# Patient Record
Sex: FEMALE | Race: WHITE | NOT HISPANIC OR LATINO | Employment: FULL TIME | URBAN - METROPOLITAN AREA
[De-identification: names, ages, dates, MRNs, and addresses within clinical notes are randomized per-mention and may not be internally consistent; named-entity substitution may affect disease eponyms.]

---

## 2023-03-08 PROBLEM — R42 DIZZINESS: Status: ACTIVE | Noted: 2023-03-08

## 2023-03-08 PROBLEM — G43.711 CHRONIC MIGRAINE WITHOUT AURA, WITH INTRACTABLE MIGRAINE, SO STATED, WITH STATUS MIGRAINOSUS: Status: ACTIVE | Noted: 2023-03-08

## 2023-03-08 PROBLEM — R09.81 NASAL CONGESTION WITH RHINORRHEA: Status: ACTIVE | Noted: 2023-03-08

## 2023-03-08 PROBLEM — M54.59 MECHANICAL LOW BACK PAIN: Status: ACTIVE | Noted: 2023-03-08

## 2023-03-08 PROBLEM — J34.2 DEVIATED NASAL SEPTUM: Status: ACTIVE | Noted: 2023-03-08

## 2023-03-08 PROBLEM — M46.1 SACROILIAC INFLAMMATION (CMS-HCC): Status: ACTIVE | Noted: 2023-03-08

## 2023-03-08 PROBLEM — J34.89 NASAL DRAINAGE: Status: ACTIVE | Noted: 2023-03-08

## 2023-03-08 PROBLEM — E66.9 CLASS 2 OBESITY WITH BODY MASS INDEX (BMI) OF 38.0 TO 38.9 IN ADULT: Status: ACTIVE | Noted: 2023-03-08

## 2023-03-08 PROBLEM — M54.2 NECK PAIN ON LEFT SIDE: Status: ACTIVE | Noted: 2023-03-08

## 2023-03-08 PROBLEM — R09.82 POSTNASAL DRIP: Status: ACTIVE | Noted: 2023-03-08

## 2023-03-08 PROBLEM — J31.0 CHRONIC RHINITIS: Status: ACTIVE | Noted: 2023-03-08

## 2023-03-08 PROBLEM — M26.629 TMJ PAIN DYSFUNCTION SYNDROME: Status: ACTIVE | Noted: 2023-03-08

## 2023-03-08 PROBLEM — M25.522 ARTHRALGIA OF LEFT ELBOW: Status: ACTIVE | Noted: 2023-03-08

## 2023-03-08 PROBLEM — R44.8 FACIAL PRESSURE: Status: ACTIVE | Noted: 2023-03-08

## 2023-03-08 PROBLEM — G43.909 MIGRAINE: Status: ACTIVE | Noted: 2023-03-08

## 2023-03-08 PROBLEM — E66.812 CLASS 2 OBESITY WITH BODY MASS INDEX (BMI) OF 38.0 TO 38.9 IN ADULT: Status: ACTIVE | Noted: 2023-03-08

## 2023-03-08 PROBLEM — T78.3XXA ANGIOEDEMA: Status: ACTIVE | Noted: 2023-03-08

## 2023-03-08 PROBLEM — R06.83 HABITUAL SNORING: Status: ACTIVE | Noted: 2023-03-08

## 2023-03-08 PROBLEM — K21.9 GERD (GASTROESOPHAGEAL REFLUX DISEASE): Status: ACTIVE | Noted: 2023-03-08

## 2023-03-08 PROBLEM — N95.1 HOT FLASHES, MENOPAUSAL: Status: ACTIVE | Noted: 2023-03-08

## 2023-03-08 PROBLEM — G24.4 OROMANDIBULAR DYSTONIA: Status: ACTIVE | Noted: 2023-03-08

## 2023-03-08 PROBLEM — M70.61 TROCHANTERIC BURSITIS OF RIGHT HIP: Status: ACTIVE | Noted: 2023-03-08

## 2023-03-08 PROBLEM — R73.03 PREDIABETES: Status: ACTIVE | Noted: 2023-03-08

## 2023-03-08 PROBLEM — R42 VERTIGO: Status: ACTIVE | Noted: 2023-03-08

## 2023-03-08 PROBLEM — F41.9 ANXIETY: Status: ACTIVE | Noted: 2023-03-08

## 2023-03-08 PROBLEM — H81.12 BENIGN PAROXYSMAL POSITIONAL VERTIGO OF LEFT EAR: Status: ACTIVE | Noted: 2023-03-08

## 2023-03-08 PROBLEM — N95.1 PERIMENOPAUSAL: Status: ACTIVE | Noted: 2023-03-08

## 2023-03-08 PROBLEM — M77.12 LATERAL EPICONDYLITIS OF LEFT ELBOW: Status: ACTIVE | Noted: 2023-03-08

## 2023-03-08 PROBLEM — K22.70 BARRETT'S ESOPHAGUS WITHOUT DYSPLASIA: Status: ACTIVE | Noted: 2023-03-08

## 2023-03-08 PROBLEM — E55.9 VITAMIN D DEFICIENCY: Status: ACTIVE | Noted: 2023-03-08

## 2023-03-08 PROBLEM — R51.9 FACIAL PAIN: Status: ACTIVE | Noted: 2023-03-08

## 2023-03-08 PROBLEM — M77.02 MEDIAL EPICONDYLITIS OF LEFT ELBOW: Status: ACTIVE | Noted: 2023-03-08

## 2023-03-08 PROBLEM — J34.89 NASAL CONGESTION WITH RHINORRHEA: Status: ACTIVE | Noted: 2023-03-08

## 2023-03-08 PROBLEM — G47.33 OBSTRUCTIVE SLEEP APNEA (ADULT) (PEDIATRIC): Status: ACTIVE | Noted: 2023-03-08

## 2023-03-08 PROBLEM — E88.819 INSULIN RESISTANCE: Status: ACTIVE | Noted: 2023-03-08

## 2023-03-08 PROBLEM — G47.00 INSOMNIA: Status: ACTIVE | Noted: 2023-03-08

## 2023-03-08 PROBLEM — E03.9 HYPOTHYROIDISM: Status: ACTIVE | Noted: 2023-03-08

## 2023-03-08 PROBLEM — J30.9 ALLERGIC RHINITIS: Status: ACTIVE | Noted: 2023-03-08

## 2023-03-08 PROBLEM — H93.8X3 EAR PRESSURE, BILATERAL: Status: ACTIVE | Noted: 2023-03-08

## 2023-03-08 PROBLEM — Q89.9: Status: ACTIVE | Noted: 2023-03-08

## 2023-03-08 PROBLEM — M99.03 SEGMENTAL AND SOMATIC DYSFUNCTION OF LUMBAR REGION: Status: ACTIVE | Noted: 2023-03-08

## 2023-03-08 PROBLEM — H90.3 ASYMMETRIC SNHL (SENSORINEURAL HEARING LOSS): Status: ACTIVE | Noted: 2023-03-08

## 2023-03-08 PROBLEM — M25.551 RIGHT HIP PAIN: Status: ACTIVE | Noted: 2023-03-08

## 2023-03-08 PROBLEM — H93.8X9 EAR POPPING: Status: ACTIVE | Noted: 2023-03-08

## 2023-03-08 PROBLEM — L50.1 URTICARIA, IDIOPATHIC: Status: ACTIVE | Noted: 2023-03-08

## 2023-03-08 RX ORDER — LIOTHYRONINE SODIUM 5 UG/1
0.5 TABLET ORAL DAILY
COMMUNITY
Start: 2020-05-28

## 2023-03-08 RX ORDER — PROGESTERONE 100 MG/1
100 CAPSULE ORAL NIGHTLY
COMMUNITY
Start: 2021-08-09

## 2023-03-08 RX ORDER — IPRATROPIUM BROMIDE 21 UG/1
2 SPRAY, METERED NASAL 3 TIMES DAILY PRN
COMMUNITY
Start: 2022-02-22 | End: 2024-05-08

## 2023-03-08 RX ORDER — ALBUTEROL SULFATE 90 UG/1
1-2 AEROSOL, METERED RESPIRATORY (INHALATION)
COMMUNITY
Start: 2021-11-24 | End: 2023-08-02 | Stop reason: SDUPTHER

## 2023-03-08 RX ORDER — ESTRADIOL 1 MG/1
1 TABLET ORAL DAILY
COMMUNITY
Start: 2021-10-04

## 2023-03-08 RX ORDER — FLUTICASONE PROPIONATE 93 UG/1
1 SPRAY, METERED NASAL 2 TIMES DAILY
COMMUNITY
Start: 2022-01-26 | End: 2024-05-08

## 2023-03-08 RX ORDER — FAMOTIDINE 20 MG/1
1 TABLET, FILM COATED ORAL DAILY
COMMUNITY
Start: 2020-08-17

## 2023-03-08 RX ORDER — SUMATRIPTAN 50 MG/1
50 TABLET, FILM COATED ORAL
COMMUNITY
Start: 2020-08-17 | End: 2023-09-19 | Stop reason: SDUPTHER

## 2023-03-08 RX ORDER — OMEPRAZOLE 40 MG/1
40 CAPSULE, DELAYED RELEASE ORAL DAILY PRN
COMMUNITY
Start: 2020-05-28

## 2023-03-08 RX ORDER — PLECANATIDE 3 MG/1
3 TABLET ORAL DAILY
COMMUNITY
End: 2023-03-10 | Stop reason: SDUPTHER

## 2023-03-08 RX ORDER — LEVOTHYROXINE SODIUM 100 UG/1
1 TABLET ORAL DAILY
COMMUNITY
Start: 2021-01-04

## 2023-03-08 RX ORDER — SEMAGLUTIDE 1.34 MG/ML
0.25 INJECTION, SOLUTION SUBCUTANEOUS
COMMUNITY
End: 2023-09-06 | Stop reason: SDUPTHER

## 2023-03-08 RX ORDER — METFORMIN HYDROCHLORIDE 500 MG/1
500 TABLET, FILM COATED, EXTENDED RELEASE ORAL
COMMUNITY
End: 2023-08-31 | Stop reason: SDUPTHER

## 2023-03-08 RX ORDER — CHOLECALCIFEROL (VITAMIN D3) 125 MCG
1 CAPSULE ORAL DAILY
COMMUNITY
Start: 2020-05-28

## 2023-03-08 RX ORDER — CARBINOXAMINE MALEATE 4 MG/1
TABLET ORAL
COMMUNITY
Start: 2021-08-17

## 2023-03-08 RX ORDER — CITALOPRAM 20 MG/1
1 TABLET, FILM COATED ORAL DAILY
COMMUNITY
Start: 2020-02-04 | End: 2024-01-17 | Stop reason: SDUPTHER

## 2023-03-10 ENCOUNTER — OFFICE VISIT (OUTPATIENT)
Dept: PRIMARY CARE | Facility: CLINIC | Age: 58
End: 2023-03-10
Payer: COMMERCIAL

## 2023-03-10 VITALS
SYSTOLIC BLOOD PRESSURE: 110 MMHG | HEART RATE: 80 BPM | BODY MASS INDEX: 37.28 KG/M2 | WEIGHT: 232 LBS | HEIGHT: 66 IN | DIASTOLIC BLOOD PRESSURE: 88 MMHG | TEMPERATURE: 97.1 F | OXYGEN SATURATION: 100 %

## 2023-03-10 DIAGNOSIS — E03.9 HYPOTHYROIDISM, UNSPECIFIED TYPE: ICD-10-CM

## 2023-03-10 DIAGNOSIS — R14.0 ABDOMINAL BLOATING: ICD-10-CM

## 2023-03-10 DIAGNOSIS — R73.03 PREDIABETES: ICD-10-CM

## 2023-03-10 DIAGNOSIS — R10.9 ABDOMINAL DISCOMFORT: ICD-10-CM

## 2023-03-10 DIAGNOSIS — K58.1 IRRITABLE BOWEL SYNDROME WITH PREDOMINANT CONSTIPATION: Primary | ICD-10-CM

## 2023-03-10 PROCEDURE — 1036F TOBACCO NON-USER: CPT | Performed by: STUDENT IN AN ORGANIZED HEALTH CARE EDUCATION/TRAINING PROGRAM

## 2023-03-10 PROCEDURE — 99213 OFFICE O/P EST LOW 20 MIN: CPT | Performed by: STUDENT IN AN ORGANIZED HEALTH CARE EDUCATION/TRAINING PROGRAM

## 2023-03-10 RX ORDER — PLECANATIDE 3 MG/1
3 TABLET ORAL DAILY
Qty: 30 TABLET | Refills: 1 | Status: SHIPPED | OUTPATIENT
Start: 2023-03-10 | End: 2023-05-17

## 2023-03-10 ASSESSMENT — PATIENT HEALTH QUESTIONNAIRE - PHQ9
SUM OF ALL RESPONSES TO PHQ9 QUESTIONS 1 AND 2: 0
1. LITTLE INTEREST OR PLEASURE IN DOING THINGS: NOT AT ALL
2. FEELING DOWN, DEPRESSED OR HOPELESS: NOT AT ALL

## 2023-03-10 NOTE — PROGRESS NOTES
Subjective   Patient ID: Kailyn Lopez is a 57 y.o. female who presents for Abdominal Pain (Stomach discomfort at night and bloating/Tries pepcid, simethicone etc).  Abdominal discomfort. Waking up with bloating. Was occurring before starting the metformin.     One week ago was having discomfort. Has had these symptoms before and was habitually taking 2 pepcid and this would resolve her symptoms. Most recently did not. Tried simethicone and forrest. Woke up 3x with this. Eventually her pain resolved in the morning. Generalized over her abdomen as well as epigastric region. No radiation. No nausea or vomiting.     One episode of diarrhea. Predominantly constipated.     Has not tried the ozempic or trulance yet.    Long hx of IBS-C. Prior Xray done in the past for similar symptoms showed significant fecal burden.      Also noticing she is waking up with anxiety when she has the bloating. Taking her hormone replacement therapy at bedtime.         Review of Systems   Constitutional:  Negative for activity change, appetite change, fatigue and fever.   Respiratory:  Negative for chest tightness and shortness of breath.    Cardiovascular:  Positive for palpitations. Negative for chest pain and leg swelling.   Gastrointestinal:  Positive for abdominal pain and constipation. Negative for abdominal distention, blood in stool, nausea and vomiting.   Genitourinary:  Negative for difficulty urinating and dysuria.   Neurological:  Negative for light-headedness.   Psychiatric/Behavioral:  Positive for sleep disturbance.        Objective   Physical Exam  Vitals reviewed.   Constitutional:       General: She is not in acute distress.     Appearance: Normal appearance. She is not toxic-appearing.   Pulmonary:      Effort: Pulmonary effort is normal.   Abdominal:      General: Abdomen is flat. There is no distension.      Palpations: Abdomen is soft.      Tenderness: There is abdominal tenderness. There is no guarding.   Musculoskeletal:          General: Normal range of motion.      Cervical back: Normal range of motion.   Neurological:      Mental Status: She is alert. Mental status is at baseline.   Psychiatric:         Mood and Affect: Mood normal.         Behavior: Behavior normal.         Assessment/Plan   1. Irritable bowel syndrome with predominant constipation  - plecanatide (Trulance) tablet tablet; Take 1 tablet (3 mg) by mouth once daily.  Dispense: 30 tablet; Refill: 1    2. Prediabetes  -Tolerating metformin well  -Recommended waiting to start the ozempic until her bowel regimen is improved    3. Hypothyroidism, unspecified type  Offered checking TSH    4. Abdominal bloating, Abdominal discomfort  Suspect secondary to hx of IBS-c and severe constipation  -if not resolving will check labs and recommend she follow up with GI  -Also recommend switching her estrogen to am and see if this has any effect on her symptoms     Follow up in 6 weeks    Oneyda Rosado, DO

## 2023-03-11 ASSESSMENT — ENCOUNTER SYMPTOMS
DYSURIA: 0
ACTIVITY CHANGE: 0
SHORTNESS OF BREATH: 0
DIFFICULTY URINATING: 0
NAUSEA: 0
SLEEP DISTURBANCE: 1
CONSTIPATION: 1
ABDOMINAL DISTENTION: 0
LIGHT-HEADEDNESS: 0
FATIGUE: 0
BLOOD IN STOOL: 0
VOMITING: 0
CHEST TIGHTNESS: 0
APPETITE CHANGE: 0
ABDOMINAL PAIN: 1
PALPITATIONS: 1
FEVER: 0

## 2023-03-22 ENCOUNTER — APPOINTMENT (OUTPATIENT)
Dept: PRIMARY CARE | Facility: CLINIC | Age: 58
End: 2023-03-22
Payer: COMMERCIAL

## 2023-04-10 ENCOUNTER — OFFICE VISIT (OUTPATIENT)
Dept: PRIMARY CARE | Facility: CLINIC | Age: 58
End: 2023-04-10
Payer: COMMERCIAL

## 2023-04-10 VITALS
TEMPERATURE: 97.7 F | SYSTOLIC BLOOD PRESSURE: 120 MMHG | WEIGHT: 227 LBS | BODY MASS INDEX: 36.64 KG/M2 | HEART RATE: 80 BPM | DIASTOLIC BLOOD PRESSURE: 80 MMHG | OXYGEN SATURATION: 98 %

## 2023-04-10 DIAGNOSIS — K21.00 GASTROESOPHAGEAL REFLUX DISEASE WITH ESOPHAGITIS WITHOUT HEMORRHAGE: ICD-10-CM

## 2023-04-10 DIAGNOSIS — R68.83 CHILLS: ICD-10-CM

## 2023-04-10 DIAGNOSIS — R14.0 ABDOMINAL BLOATING: Primary | ICD-10-CM

## 2023-04-10 LAB
BUDDING YEAST, URINE: PRESENT /HPF
RBC, URINE: 1 /HPF (ref 0–5)
SQUAMOUS EPITHELIAL CELLS, URINE: 4 /HPF
WBC, URINE: 2 /HPF (ref 0–5)

## 2023-04-10 PROCEDURE — 99214 OFFICE O/P EST MOD 30 MIN: CPT | Performed by: STUDENT IN AN ORGANIZED HEALTH CARE EDUCATION/TRAINING PROGRAM

## 2023-04-10 PROCEDURE — 81001 URINALYSIS AUTO W/SCOPE: CPT

## 2023-04-10 PROCEDURE — 1036F TOBACCO NON-USER: CPT | Performed by: STUDENT IN AN ORGANIZED HEALTH CARE EDUCATION/TRAINING PROGRAM

## 2023-04-10 ASSESSMENT — ENCOUNTER SYMPTOMS
CHEST TIGHTNESS: 0
BLOOD IN STOOL: 0
SHORTNESS OF BREATH: 0
CHILLS: 1
APPETITE CHANGE: 0
UNEXPECTED WEIGHT CHANGE: 0
PALPITATIONS: 0
SLEEP DISTURBANCE: 1
LIGHT-HEADEDNESS: 0
FLANK PAIN: 0
DIAPHORESIS: 0
FEVER: 0
FATIGUE: 0
DIZZINESS: 0
HEADACHES: 0
DIARRHEA: 0
NERVOUS/ANXIOUS: 1
DYSURIA: 0
ROS GI COMMENTS: BLOATING
VOMITING: 0
ARTHRALGIAS: 0

## 2023-04-10 NOTE — PROGRESS NOTES
Subjective   Patient ID: Kailyn Lopez is a 57 y.o. female who presents for Abdominal Pain (Abdominal issues/flare ups at night time /Freezing/shivering - makes her feel panicked).  Started trulance last visit. Was doing well with this for about one month. Then had a couple episodes of fecal incontinence. Advised to switch to every other day. Just started doing this.     Last Friday fell asleep in the middle of the night. Woke up, felt very cold, shaky, weak. Had to bundle up. Felt bloated and had gas. No abdominal pain. Took pepcid, simethicone, and laid down. Lasted longer than prior episodes. 3.5 hours. Urinated 2x. No bowel movements or diarrhea associated with these episodes.     Had a grilled turkey burger and a salad. No problems after this.     Had this 2 other times since last visit. Vesper episodes within 30 minutes resolved.     First time she remembers these episodes starting was in October.     Saw ENT to discuss RAMESH and deviated septum. Has dental apploance but cannot wear due to headaches. Was recommended to wear CPAP. Found to have significant reflux still on endoscopy in office with ENT. Last EGD was from 2020. Has been on omeprazole 40mg BID for years.     Has her gallbladder still.             Review of Systems   Constitutional:  Positive for chills. Negative for appetite change, diaphoresis, fatigue, fever and unexpected weight change.   Respiratory:  Negative for chest tightness and shortness of breath.    Cardiovascular:  Negative for chest pain and palpitations.   Gastrointestinal:  Negative for blood in stool, diarrhea and vomiting.        Bloating   Genitourinary:  Positive for urgency. Negative for dysuria and flank pain.   Musculoskeletal:  Negative for arthralgias.   Neurological:  Negative for dizziness, light-headedness and headaches.   Psychiatric/Behavioral:  Positive for sleep disturbance. The patient is nervous/anxious.        Objective   Physical Exam  Vitals reviewed.    Constitutional:       General: She is not in acute distress.     Appearance: Normal appearance. She is not ill-appearing, toxic-appearing or diaphoretic.   Eyes:      Pupils: Pupils are equal, round, and reactive to light.   Cardiovascular:      Rate and Rhythm: Normal rate and regular rhythm.      Heart sounds: No murmur heard.  Pulmonary:      Effort: No respiratory distress.      Breath sounds: No wheezing.   Abdominal:      General: Abdomen is flat. There is no distension.      Palpations: Abdomen is soft.      Tenderness: There is no guarding.      Comments: Mild tenderness in LUQ and RUQ to moderate palpation    Skin:     General: Skin is warm and dry.   Neurological:      General: No focal deficit present.      Mental Status: She is alert. Mental status is at baseline.   Psychiatric:         Mood and Affect: Mood normal.         Behavior: Behavior normal.         Assessment/Plan   Diagnoses and all orders for this visit:  Abdominal bloating  Comments:  US- possibly IBS vs biliary colic vs severe GERD  Orders:  -     Referral to Gastroenterology; Future  -     US abdomen; Future  -     Urinalysis Microscopic Only  Chills  Comments:  advised to check her temp at these times  UA  Orders:  -     US abdomen; Future  -     Urinalysis Microscopic Only  Gastroesophageal reflux disease with esophagitis without hemorrhage  Comments:  currnetly on BID PPI  could consider carafate  recommend repeat EGD  Orders:  -     Referral to Gastroenterology; Future       Oneyda Rosado, DO

## 2023-05-01 ENCOUNTER — OFFICE VISIT (OUTPATIENT)
Dept: PRIMARY CARE | Facility: CLINIC | Age: 58
End: 2023-05-01
Payer: COMMERCIAL

## 2023-05-01 VITALS
OXYGEN SATURATION: 99 % | SYSTOLIC BLOOD PRESSURE: 100 MMHG | HEART RATE: 101 BPM | BODY MASS INDEX: 36.48 KG/M2 | DIASTOLIC BLOOD PRESSURE: 70 MMHG | TEMPERATURE: 97.7 F | WEIGHT: 226 LBS

## 2023-05-01 DIAGNOSIS — K22.70 BARRETT'S ESOPHAGUS WITHOUT DYSPLASIA: ICD-10-CM

## 2023-05-01 DIAGNOSIS — R73.03 PREDIABETES: ICD-10-CM

## 2023-05-01 DIAGNOSIS — F41.9 ANXIETY: Primary | ICD-10-CM

## 2023-05-01 DIAGNOSIS — K58.1 IRRITABLE BOWEL SYNDROME WITH PREDOMINANT CONSTIPATION: ICD-10-CM

## 2023-05-01 DIAGNOSIS — K21.00 GASTROESOPHAGEAL REFLUX DISEASE WITH ESOPHAGITIS WITHOUT HEMORRHAGE: ICD-10-CM

## 2023-05-01 DIAGNOSIS — G47.33 OBSTRUCTIVE SLEEP APNEA (ADULT) (PEDIATRIC): ICD-10-CM

## 2023-05-01 LAB
ESTIMATED AVERAGE GLUCOSE FOR HBA1C: 120 MG/DL
HEMOGLOBIN A1C/HEMOGLOBIN TOTAL IN BLOOD: 5.8 %

## 2023-05-01 PROCEDURE — 36415 COLL VENOUS BLD VENIPUNCTURE: CPT | Performed by: STUDENT IN AN ORGANIZED HEALTH CARE EDUCATION/TRAINING PROGRAM

## 2023-05-01 PROCEDURE — 83036 HEMOGLOBIN GLYCOSYLATED A1C: CPT

## 2023-05-01 PROCEDURE — 3008F BODY MASS INDEX DOCD: CPT | Performed by: STUDENT IN AN ORGANIZED HEALTH CARE EDUCATION/TRAINING PROGRAM

## 2023-05-01 PROCEDURE — 1036F TOBACCO NON-USER: CPT | Performed by: STUDENT IN AN ORGANIZED HEALTH CARE EDUCATION/TRAINING PROGRAM

## 2023-05-01 PROCEDURE — 99214 OFFICE O/P EST MOD 30 MIN: CPT | Performed by: STUDENT IN AN ORGANIZED HEALTH CARE EDUCATION/TRAINING PROGRAM

## 2023-05-01 RX ORDER — BUSPIRONE HYDROCHLORIDE 5 MG/1
10 TABLET ORAL DAILY PRN
Qty: 60 TABLET | Refills: 2 | Status: SHIPPED | OUTPATIENT
Start: 2023-05-01 | End: 2023-05-29

## 2023-05-01 ASSESSMENT — ENCOUNTER SYMPTOMS
APPETITE CHANGE: 0
FATIGUE: 0
ACTIVITY CHANGE: 0
NERVOUS/ANXIOUS: 1
NAUSEA: 0
CHEST TIGHTNESS: 0
UNEXPECTED WEIGHT CHANGE: 0
SHORTNESS OF BREATH: 0
CONSTIPATION: 1
VOMITING: 0
BLOOD IN STOOL: 0

## 2023-05-01 NOTE — PROGRESS NOTES
Subjective   Patient ID: Kailyn Lopez is a 57 y.o. female who presents for 3 month a1c check .  No more shivering episodes.  She thinks these were connected to the specific chair she was sleeping in and its proximity to a directed window.  Since then has taken naps elsewhere and has not had the same problem.    Frustrated, has not lost weight in a couple weeks. Has not started Ozempic yet.     Started CPAP 2 weeks ago. No changes noticed yet.     Bowels have been fluctuating.  Initially did well on daily Trulance then started having looser bowel movements.  Switched to taking every other day which worked well for short period of time but now has noticed intermittent constipation again.    History of Hui's esophagus without dysplasia.  Has upcoming GI appointment.  Taking her PPI daily.     Also more stress lately with mother and partners health.         Review of Systems   Constitutional:  Negative for activity change, appetite change, fatigue and unexpected weight change.   Eyes:  Negative for visual disturbance.   Respiratory:  Negative for chest tightness and shortness of breath.    Gastrointestinal:  Positive for constipation. Negative for blood in stool, nausea and vomiting.   Psychiatric/Behavioral:  The patient is nervous/anxious.        Objective   Physical Exam  Vitals reviewed.   Constitutional:       General: She is not in acute distress.     Appearance: She is obese. She is not toxic-appearing.   HENT:      Head: Normocephalic.   Eyes:      Pupils: Pupils are equal, round, and reactive to light.   Pulmonary:      Effort: Pulmonary effort is normal.   Skin:     General: Skin is warm and dry.   Neurological:      General: No focal deficit present.      Mental Status: She is alert. Mental status is at baseline.   Psychiatric:         Mood and Affect: Mood normal.         Behavior: Behavior normal.       Current Outpatient Medications:     albuterol 90 mcg/actuation inhaler, Inhale 1-2 puffs. EVERY 4 TO 6  HOURS AS NEEDED., Disp: , Rfl:     carbinoxamine maleate 4 mg tablet, Take by mouth., Disp: , Rfl:     cholecalciferol (Vitamin D-3) 125 MCG (5000 UT) capsule, Take 1 capsule (125 mcg) by mouth once daily., Disp: , Rfl:     citalopram (CeleXA) 20 mg tablet, Take 1 tablet (20 mg) by mouth once daily., Disp: , Rfl:     estradiol (Estrace) 0.5 mg tablet, Take 1 tablet (0.5 mg) by mouth once daily., Disp: , Rfl:     famotidine (Pepcid) 20 mg tablet, Take 1 tablet (20 mg) by mouth once daily., Disp: , Rfl:     fluticasone propionate (Xhance) 93 mcg/actuation aerosol breath activated, Administer 1 spray into each nostril in the morning and 1 spray before bedtime., Disp: , Rfl:     ipratropium (Atrovent) 21 mcg (0.03 %) nasal spray, Administer 2 sprays into each nostril 3 times a day as needed., Disp: , Rfl:     levothyroxine (Synthroid, Levoxyl) 100 mcg tablet, Take 1 tablet (100 mcg) by mouth once daily., Disp: , Rfl:     liothyronine (Cytomel) 5 mcg tablet, Take 0.5 tablets (2.5 mcg) by mouth once daily., Disp: , Rfl:     metFORMIN, MOD, (Glumetza) 500 mg 24 hr tablet, Take 1 tablet (500 mg) by mouth once daily in the evening. Take with meals. Do not crush, chew, or split., Disp: , Rfl:     omeprazole (PriLOSEC) 40 mg DR capsule, Take 1 capsule (40 mg) by mouth once daily as needed. IN THE MORNING. 60 min BEFORE a MEAL, Disp: , Rfl:     plecanatide (Trulance) tablet tablet, Take 1 tablet (3 mg) by mouth once daily., Disp: 30 tablet, Rfl: 1    progesterone (Prometrium) 100 mg capsule, Take 1 capsule (100 mg) by mouth once daily at bedtime., Disp: , Rfl:     SUMAtriptan (Imitrex) 50 mg tablet, Take 1 tablet (50 mg) by mouth. FOR MIGRAINE RELIEF. MAY REPEAT EVERY 2 HOURS MAXIMUM  MG PER DAY, Disp: , Rfl:     busPIRone (Buspar) 5 mg tablet, Take 2 tablets (10 mg) by mouth once daily as needed (anxiety)., Disp: 60 tablet, Rfl: 2    semaglutide (Ozempic) 0.25 mg or 0.5 mg(2 mg/1.5 mL) pen injector, Inject 0.25 mg under  the skin 1 (one) time per week. For 4 weeks then increase to 0.5 MG weekly, Disp: , Rfl:       Assessment/Plan   Diagnoses and all orders for this visit:  Anxiety  Comments:  also discussed could increase her citalopram dose to 30mg  Orders:  -     busPIRone (Buspar) 5 mg tablet; Take 2 tablets (10 mg) by mouth once daily as needed (anxiety).  Prediabetes  Comments:  recommended starting the ozempic  Orders:  -     Hemoglobin A1c  Gastroesophageal reflux disease with esophagitis without hemorrhage  Hui's esophagus without dysplasia  Comments:  upcoming GI appointment  Irritable bowel syndrome with predominant constipation  Obstructive sleep apnea (adult) (pediatric)  Comments:  started her cpap 2 weeks ago  BMI 36.0-36.9,adult  Comments:  doing well    The patient received Provided instructions on dietary changes because they have an above normal BMI.    Oneyda Rosado, DO

## 2023-05-17 DIAGNOSIS — K58.1 IRRITABLE BOWEL SYNDROME WITH PREDOMINANT CONSTIPATION: ICD-10-CM

## 2023-05-17 RX ORDER — PLECANATIDE 3 MG/1
3 TABLET ORAL DAILY
Qty: 30 TABLET | Refills: 1 | Status: SHIPPED | OUTPATIENT
Start: 2023-05-17 | End: 2023-11-08 | Stop reason: WASHOUT

## 2023-05-28 DIAGNOSIS — F41.9 ANXIETY: ICD-10-CM

## 2023-05-29 RX ORDER — BUSPIRONE HYDROCHLORIDE 5 MG/1
10 TABLET ORAL DAILY PRN
Qty: 60 TABLET | Refills: 2 | Status: SHIPPED | OUTPATIENT
Start: 2023-05-29 | End: 2024-03-22 | Stop reason: SDUPTHER

## 2023-06-05 ENCOUNTER — PATIENT MESSAGE (OUTPATIENT)
Dept: PRIMARY CARE | Facility: CLINIC | Age: 58
End: 2023-06-05
Payer: COMMERCIAL

## 2023-06-05 DIAGNOSIS — J01.00 ACUTE NON-RECURRENT MAXILLARY SINUSITIS: Primary | ICD-10-CM

## 2023-06-05 RX ORDER — AMOXICILLIN AND CLAVULANATE POTASSIUM 875; 125 MG/1; MG/1
875 TABLET, FILM COATED ORAL 2 TIMES DAILY
Qty: 14 TABLET | Refills: 0 | Status: SHIPPED | OUTPATIENT
Start: 2023-06-05 | End: 2023-06-15

## 2023-06-07 ENCOUNTER — APPOINTMENT (OUTPATIENT)
Dept: PRIMARY CARE | Facility: CLINIC | Age: 58
End: 2023-06-07
Payer: COMMERCIAL

## 2023-08-02 ENCOUNTER — OFFICE VISIT (OUTPATIENT)
Dept: PRIMARY CARE | Facility: CLINIC | Age: 58
End: 2023-08-02
Payer: COMMERCIAL

## 2023-08-02 VITALS
HEIGHT: 66 IN | BODY MASS INDEX: 34.07 KG/M2 | OXYGEN SATURATION: 98 % | WEIGHT: 212 LBS | DIASTOLIC BLOOD PRESSURE: 80 MMHG | HEART RATE: 84 BPM | SYSTOLIC BLOOD PRESSURE: 110 MMHG

## 2023-08-02 DIAGNOSIS — E66.09 CLASS 1 OBESITY DUE TO EXCESS CALORIES WITH BODY MASS INDEX (BMI) OF 34.0 TO 34.9 IN ADULT, UNSPECIFIED WHETHER SERIOUS COMORBIDITY PRESENT: ICD-10-CM

## 2023-08-02 DIAGNOSIS — R73.03 PREDIABETES: Primary | ICD-10-CM

## 2023-08-02 DIAGNOSIS — J45.20 INTERMITTENT ASTHMA WITHOUT COMPLICATION, UNSPECIFIED ASTHMA SEVERITY (HHS-HCC): ICD-10-CM

## 2023-08-02 LAB — HBA1C MFR BLD: 5.7 % (ref 4.2–6.5)

## 2023-08-02 PROCEDURE — 83036 HEMOGLOBIN GLYCOSYLATED A1C: CPT | Performed by: STUDENT IN AN ORGANIZED HEALTH CARE EDUCATION/TRAINING PROGRAM

## 2023-08-02 PROCEDURE — 3008F BODY MASS INDEX DOCD: CPT | Performed by: STUDENT IN AN ORGANIZED HEALTH CARE EDUCATION/TRAINING PROGRAM

## 2023-08-02 PROCEDURE — 1036F TOBACCO NON-USER: CPT | Performed by: STUDENT IN AN ORGANIZED HEALTH CARE EDUCATION/TRAINING PROGRAM

## 2023-08-02 PROCEDURE — 99213 OFFICE O/P EST LOW 20 MIN: CPT | Performed by: STUDENT IN AN ORGANIZED HEALTH CARE EDUCATION/TRAINING PROGRAM

## 2023-08-02 RX ORDER — ALBUTEROL SULFATE 90 UG/1
1-2 AEROSOL, METERED RESPIRATORY (INHALATION) EVERY 4 HOURS PRN
Qty: 18 G | Refills: 3 | Status: SHIPPED | OUTPATIENT
Start: 2023-08-02

## 2023-08-02 ASSESSMENT — ENCOUNTER SYMPTOMS
DIFFICULTY URINATING: 0
APPETITE CHANGE: 0
DIAPHORESIS: 0
SPEECH DIFFICULTY: 0
SEIZURES: 1
SHORTNESS OF BREATH: 0
ACTIVITY CHANGE: 0
COUGH: 0
CHILLS: 0
FATIGUE: 0
CHOKING: 0
CHEST TIGHTNESS: 0
LIGHT-HEADEDNESS: 0
DYSURIA: 0
DIZZINESS: 0
FACIAL ASYMMETRY: 0

## 2023-08-02 NOTE — PROGRESS NOTES
"Subjective   Patient ID: Kailyn Lopez is a 57 y.o. female who presents for No chief complaint on file..    A 57 yr old female, with pre-diabetes, obesity, presented for follow up. She is here to re-check A1c, last A1c was 5.8.  Patient reports weight gain since after her ankle surgery last year, she was on Ozempic for 5 weeks(0.25), but had to stop it, as it was exacerbating her migraine, causing nausea, vomiting. Patient has been tracking her diet, has not started exercising yet, she is upcoming visit with orthopedic and might be started on Medrol dose pack for continued numbness in her right foot that is affecting her ambulation.         Review of Systems   Constitutional:  Negative for activity change, appetite change, chills, diaphoresis and fatigue.   HENT:  Negative for congestion.    Eyes:  Negative for visual disturbance.   Respiratory:  Negative for cough, choking, chest tightness and shortness of breath.    Genitourinary:  Negative for difficulty urinating and dysuria.   Neurological:  Positive for seizures. Negative for dizziness, syncope, facial asymmetry, speech difficulty and light-headedness.       Objective   /80 (BP Location: Right arm, Patient Position: Sitting, BP Cuff Size: Large adult)   Pulse 84   Ht 1.676 m (5' 6\")   Wt 96.2 kg (212 lb)   SpO2 98%   BMI 34.22 kg/m²     Physical Exam  Constitutional:       Appearance: Normal appearance. She is obese.   HENT:      Head: Normocephalic and atraumatic.      Nose: Nose normal.   Eyes:      Extraocular Movements: Extraocular movements intact.      Pupils: Pupils are equal, round, and reactive to light.   Cardiovascular:      Rate and Rhythm: Normal rate and regular rhythm.   Pulmonary:      Effort: Pulmonary effort is normal.      Breath sounds: Normal breath sounds.   Abdominal:      Palpations: Abdomen is soft.   Musculoskeletal:         General: Normal range of motion.      Cervical back: Neck supple.   Skin:     General: Skin is warm.    "   Capillary Refill: Capillary refill takes less than 2 seconds.   Neurological:      Mental Status: She is alert.   Psychiatric:         Mood and Affect: Mood normal.         Judgment: Judgment normal.       Current Outpatient Medications:     busPIRone (Buspar) 5 mg tablet, TAKE 2 TABLETS (10 MG) BY MOUTH ONCE DAILY AS NEEDED (ANXIETY)., Disp: 60 tablet, Rfl: 2    carbinoxamine maleate 4 mg tablet, Take by mouth., Disp: , Rfl:     cholecalciferol (Vitamin D-3) 125 MCG (5000 UT) capsule, Take 1 capsule (125 mcg) by mouth once daily., Disp: , Rfl:     citalopram (CeleXA) 20 mg tablet, Take 1 tablet (20 mg) by mouth once daily., Disp: , Rfl:     estradiol (Estrace) 0.5 mg tablet, Take 1 tablet (0.5 mg) by mouth once daily., Disp: , Rfl:     famotidine (Pepcid) 20 mg tablet, Take 1 tablet (20 mg) by mouth once daily., Disp: , Rfl:     fluticasone propionate (Xhance) 93 mcg/actuation aerosol breath activated, Administer 1 spray into each nostril in the morning and 1 spray before bedtime., Disp: , Rfl:     ipratropium (Atrovent) 21 mcg (0.03 %) nasal spray, Administer 2 sprays into each nostril 3 times a day as needed., Disp: , Rfl:     levothyroxine (Synthroid, Levoxyl) 100 mcg tablet, Take 1 tablet (100 mcg) by mouth once daily., Disp: , Rfl:     liothyronine (Cytomel) 5 mcg tablet, Take 0.5 tablets (2.5 mcg) by mouth once daily., Disp: , Rfl:     metFORMIN, MOD, (Glumetza) 500 mg 24 hr tablet, Take 1 tablet (500 mg) by mouth once daily in the evening. Take with meals. Do not crush, chew, or split., Disp: , Rfl:     omeprazole (PriLOSEC) 40 mg DR capsule, Take 1 capsule (40 mg) by mouth once daily as needed. IN THE MORNING. 60 min BEFORE a MEAL, Disp: , Rfl:     pirbuterol acetate (MAXAIR AUTOHALER INHL), 200 mcg., Disp: , Rfl:     progesterone (Prometrium) 100 mg capsule, Take 1 capsule (100 mg) by mouth once daily at bedtime., Disp: , Rfl:     SUMAtriptan (Imitrex) 50 mg tablet, Take 1 tablet (50 mg) by mouth. FOR  MIGRAINE RELIEF. MAY REPEAT EVERY 2 HOURS MAXIMUM  MG PER DAY, Disp: , Rfl:     Trulance tablet tablet, TAKE 1 TABLET (3 MG) BY MOUTH ONCE DAILY., Disp: 30 tablet, Rfl: 1    albuterol 90 mcg/actuation inhaler, Inhale 1-2 puffs every 4 hours if needed for wheezing. EVERY 4 TO 6 HOURS AS NEEDED., Disp: 18 g, Rfl: 3    semaglutide (Ozempic) 0.25 mg or 0.5 mg(2 mg/1.5 mL) pen injector, Inject 0.25 mg under the skin 1 (one) time per week. For 4 weeks then increase to 0.5 MG weekly, Disp: , Rfl:       Assessment/Plan   Diagnoses and all orders for this visit:  Prediabetes  Comments:  a1c improved from 5.8% to 5.7%, Patient is willing to re-start Ozempic for weight loss.  start low dose and adjust as tolerated  Orders:  -     POCT Glycosylated Hemoglobin (HGB A1C) docked device  Intermittent asthma without complication, unspecified asthma severity  -     albuterol 90 mcg/actuation inhaler; Inhale 1-2 puffs every 4 hours if needed for wheezing. EVERY 4 TO 6 HOURS AS NEEDED.  Class 1 obesity due to excess calories with body mass index (BMI) of 34.0 to 34.9 in adult, unspecified whether serious comorbidity present  Comments:  doing well, down another 10 pounds  following weight watchers plan  Other orders  -     POCT GLYCOSYLATED HEMOGLOBIN (HGB A1C)    The patient received Provided instructions on dietary changes because they have an above normal BMI.      I saw and evaluated the patient. I personally obtained the key and critical portions of the history and physical exam or was physically present for key and critical portions performed by the trainee. I reviewed the trainee's documentation and discussed the patient with the trainee. I agree with the trainee's medical decision making, as documented on the trainee's note.      Oneyda Rosado, DO

## 2023-08-30 ENCOUNTER — PATIENT MESSAGE (OUTPATIENT)
Dept: PRIMARY CARE | Facility: CLINIC | Age: 58
End: 2023-08-30
Payer: COMMERCIAL

## 2023-08-30 DIAGNOSIS — E66.09 CLASS 1 OBESITY DUE TO EXCESS CALORIES WITH BODY MASS INDEX (BMI) OF 34.0 TO 34.9 IN ADULT, UNSPECIFIED WHETHER SERIOUS COMORBIDITY PRESENT: Primary | ICD-10-CM

## 2023-08-31 RX ORDER — METFORMIN HYDROCHLORIDE 500 MG/1
500 TABLET, EXTENDED RELEASE ORAL
Qty: 90 TABLET | Refills: 1 | Status: SHIPPED | OUTPATIENT
Start: 2023-08-31 | End: 2024-02-16

## 2023-09-06 ENCOUNTER — OFFICE VISIT (OUTPATIENT)
Dept: PRIMARY CARE | Facility: CLINIC | Age: 58
End: 2023-09-06
Payer: COMMERCIAL

## 2023-09-06 VITALS
BODY MASS INDEX: 34.22 KG/M2 | DIASTOLIC BLOOD PRESSURE: 70 MMHG | HEART RATE: 72 BPM | SYSTOLIC BLOOD PRESSURE: 104 MMHG | WEIGHT: 212 LBS | OXYGEN SATURATION: 98 %

## 2023-09-06 DIAGNOSIS — K22.70 BARRETT'S ESOPHAGUS WITHOUT DYSPLASIA: ICD-10-CM

## 2023-09-06 DIAGNOSIS — K58.1 IRRITABLE BOWEL SYNDROME WITH PREDOMINANT CONSTIPATION: ICD-10-CM

## 2023-09-06 DIAGNOSIS — N95.1 MENOPAUSE SYNDROME: ICD-10-CM

## 2023-09-06 DIAGNOSIS — G47.33 OBSTRUCTIVE SLEEP APNEA (ADULT) (PEDIATRIC): ICD-10-CM

## 2023-09-06 DIAGNOSIS — M79.10 MYALGIA: ICD-10-CM

## 2023-09-06 DIAGNOSIS — J45.20 INTERMITTENT ASTHMA WITHOUT COMPLICATION, UNSPECIFIED ASTHMA SEVERITY (HHS-HCC): ICD-10-CM

## 2023-09-06 DIAGNOSIS — E66.09 CLASS 1 OBESITY DUE TO EXCESS CALORIES WITH BODY MASS INDEX (BMI) OF 34.0 TO 34.9 IN ADULT, UNSPECIFIED WHETHER SERIOUS COMORBIDITY PRESENT: ICD-10-CM

## 2023-09-06 DIAGNOSIS — K21.00 GASTROESOPHAGEAL REFLUX DISEASE WITH ESOPHAGITIS WITHOUT HEMORRHAGE: ICD-10-CM

## 2023-09-06 DIAGNOSIS — M25.50 ARTHRALGIA, UNSPECIFIED JOINT: Primary | ICD-10-CM

## 2023-09-06 DIAGNOSIS — R73.03 PREDIABETES: ICD-10-CM

## 2023-09-06 LAB
C REACTIVE PROTEIN (MG/L) IN SER/PLAS: 1.16 MG/DL
RHEUMATOID FACTOR (IU/ML) IN SERUM OR PLASMA: <10 IU/ML (ref 0–15)
SEDIMENTATION RATE, ERYTHROCYTE: 14 MM/H (ref 0–30)

## 2023-09-06 PROCEDURE — 86225 DNA ANTIBODY NATIVE: CPT

## 2023-09-06 PROCEDURE — 86235 NUCLEAR ANTIGEN ANTIBODY: CPT

## 2023-09-06 PROCEDURE — 99214 OFFICE O/P EST MOD 30 MIN: CPT | Performed by: STUDENT IN AN ORGANIZED HEALTH CARE EDUCATION/TRAINING PROGRAM

## 2023-09-06 PROCEDURE — 86039 ANTINUCLEAR ANTIBODIES (ANA): CPT

## 2023-09-06 PROCEDURE — 86140 C-REACTIVE PROTEIN: CPT

## 2023-09-06 PROCEDURE — 3008F BODY MASS INDEX DOCD: CPT | Performed by: STUDENT IN AN ORGANIZED HEALTH CARE EDUCATION/TRAINING PROGRAM

## 2023-09-06 PROCEDURE — 85652 RBC SED RATE AUTOMATED: CPT

## 2023-09-06 PROCEDURE — 86431 RHEUMATOID FACTOR QUANT: CPT

## 2023-09-06 PROCEDURE — 1036F TOBACCO NON-USER: CPT | Performed by: STUDENT IN AN ORGANIZED HEALTH CARE EDUCATION/TRAINING PROGRAM

## 2023-09-06 PROCEDURE — 86038 ANTINUCLEAR ANTIBODIES: CPT

## 2023-09-06 RX ORDER — CYCLOBENZAPRINE HCL 5 MG
5-10 TABLET ORAL NIGHTLY PRN
Qty: 30 TABLET | Refills: 1 | Status: SHIPPED | OUTPATIENT
Start: 2023-09-06 | End: 2023-11-05

## 2023-09-06 RX ORDER — SEMAGLUTIDE 1.34 MG/ML
INJECTION, SOLUTION SUBCUTANEOUS
Qty: 3 ML | Refills: 2 | Status: SHIPPED | OUTPATIENT
Start: 2023-09-06

## 2023-09-06 ASSESSMENT — PATIENT HEALTH QUESTIONNAIRE - PHQ9
1. LITTLE INTEREST OR PLEASURE IN DOING THINGS: NOT AT ALL
SUM OF ALL RESPONSES TO PHQ9 QUESTIONS 1 AND 2: 0
2. FEELING DOWN, DEPRESSED OR HOPELESS: NOT AT ALL

## 2023-09-06 NOTE — PROGRESS NOTES
Subjective   Patient ID: Kailyn Lopez is a 57 y.o. female who presents for Generalized Body Aches (Patient having aches and pains. Wondering if its a hormonal problem? Is being desensitized to estrogen shortly for treatment).  Diffuse achiness and pains. No prior autoimmune testing.     Bowels regular, doing much better.     Numbness in right foot. Got PrP injections where she had her prior ankle surgery. Takes tylenol 2x per day. Helps minimally.     Doing weight watchers. Wants to move more but is limited by pain.     Following up with sleep medicine soon. Could not tolerate CPAP mask.     Has mammogram coming up.     Seeing allergy/immunology soon for estrogen desensitization.     Follows with GI for her barretts esophagus.             Review of Systems    Objective   Physical Exam  Vitals reviewed.   Constitutional:       General: She is not in acute distress.     Appearance: Normal appearance. She is obese.   HENT:      Head: Normocephalic.      Mouth/Throat:      Mouth: Mucous membranes are moist.   Eyes:      Pupils: Pupils are equal, round, and reactive to light.   Cardiovascular:      Rate and Rhythm: Normal rate and regular rhythm.   Pulmonary:      Effort: Pulmonary effort is normal. No respiratory distress.      Breath sounds: Normal breath sounds. No wheezing or rhonchi.   Musculoskeletal:         General: Normal range of motion.   Skin:     General: Skin is warm and dry.   Neurological:      General: No focal deficit present.      Mental Status: She is alert. Mental status is at baseline.   Psychiatric:         Mood and Affect: Mood normal.         Behavior: Behavior normal.           Current Outpatient Medications:     albuterol 90 mcg/actuation inhaler, Inhale 1-2 puffs every 4 hours if needed for wheezing. EVERY 4 TO 6 HOURS AS NEEDED., Disp: 18 g, Rfl: 3    busPIRone (Buspar) 5 mg tablet, TAKE 2 TABLETS (10 MG) BY MOUTH ONCE DAILY AS NEEDED (ANXIETY)., Disp: 60 tablet, Rfl: 2    carbinoxamine maleate  4 mg tablet, Take by mouth., Disp: , Rfl:     cholecalciferol (Vitamin D-3) 125 MCG (5000 UT) capsule, Take 1 capsule (125 mcg) by mouth once daily., Disp: , Rfl:     citalopram (CeleXA) 20 mg tablet, Take 1 tablet (20 mg) by mouth once daily., Disp: , Rfl:     estradiol (Estrace) 0.5 mg tablet, Take 1 tablet (0.5 mg) by mouth once daily., Disp: , Rfl:     famotidine (Pepcid) 20 mg tablet, Take 1 tablet (20 mg) by mouth once daily., Disp: , Rfl:     fluticasone propionate (Xhance) 93 mcg/actuation aerosol breath activated, Administer 1 spray into each nostril in the morning and 1 spray before bedtime., Disp: , Rfl:     ipratropium (Atrovent) 21 mcg (0.03 %) nasal spray, Administer 2 sprays into each nostril 3 times a day as needed., Disp: , Rfl:     levothyroxine (Synthroid, Levoxyl) 100 mcg tablet, Take 1 tablet (100 mcg) by mouth once daily., Disp: , Rfl:     liothyronine (Cytomel) 5 mcg tablet, Take 0.5 tablets (2.5 mcg) by mouth once daily., Disp: , Rfl:     metFORMIN XR (metFORMIN, MOD,) 500 mg 24 hr tablet, Take 1 tablet (500 mg) by mouth once daily in the evening. Take with meals. Do not crush, chew, or split., Disp: 90 tablet, Rfl: 1    omeprazole (PriLOSEC) 40 mg DR capsule, Take 1 capsule (40 mg) by mouth once daily as needed. IN THE MORNING. 60 min BEFORE a MEAL, Disp: , Rfl:     pirbuterol acetate (MAXAIR AUTOHALER INHL), 200 mcg., Disp: , Rfl:     progesterone (Prometrium) 100 mg capsule, Take 1 capsule (100 mg) by mouth once daily at bedtime., Disp: , Rfl:     SUMAtriptan (Imitrex) 50 mg tablet, Take 1 tablet (50 mg) by mouth. FOR MIGRAINE RELIEF. MAY REPEAT EVERY 2 HOURS MAXIMUM  MG PER DAY, Disp: , Rfl:     Trulance tablet tablet, TAKE 1 TABLET (3 MG) BY MOUTH ONCE DAILY., Disp: 30 tablet, Rfl: 1    cyclobenzaprine (Flexeril) 5 mg tablet, Take 1-2 tablets (5-10 mg) by mouth as needed at bedtime for muscle spasms., Disp: 30 tablet, Rfl: 1    semaglutide (Ozempic) 0.25 mg or 0.5 mg(2 mg/1.5 mL)  pen injector, Inject 0.5mg weekly, Disp: 3 mL, Rfl: 2      Assessment/Plan   Diagnoses and all orders for this visit:  Arthralgia, unspecified joint  -     KRISH with Reflex to TIM  -     Rheumatoid factor  -     C-reactive protein  -     Sedimentation Rate  -     TIM Panel  Myalgia  -     cyclobenzaprine (Flexeril) 5 mg tablet; Take 1-2 tablets (5-10 mg) by mouth as needed at bedtime for muscle spasms.  Prediabetes  -     semaglutide (Ozempic) 0.25 mg or 0.5 mg(2 mg/1.5 mL) pen injector; Inject 0.5mg weekly  Class 1 obesity due to excess calories with body mass index (BMI) of 34.0 to 34.9 in adult, unspecified whether serious comorbidity present  -     semaglutide (Ozempic) 0.25 mg or 0.5 mg(2 mg/1.5 mL) pen injector; Inject 0.5mg weekly  Hui's esophagus without dysplasia  Comments:  follows with GI  Intermittent asthma without complication, unspecified asthma severity  Gastroesophageal reflux disease with esophagitis without hemorrhage  Comments:  taking her PPI  Obstructive sleep apnea (adult) (pediatric)  Comments:  seeing sleep medicine soon, could not tolerate cpap mask  Irritable bowel syndrome with predominant constipation  Comments:  doing much better  Menopause syndrome  Comments:  seeing allergy/immunology about estrogen allergy desensitization    The patient received Provided instructions on dietary changes because they have an above normal BMI.      Follow up in 3 months    Oneyda Rosado DO

## 2023-09-07 LAB
ANA PATTERN: ABNORMAL
ANA TITER: ABNORMAL
ANTI-CENTROMERE: <0.2 AI
ANTI-CHROMATIN: <0.2 AI
ANTI-DNA (DS): <1 IU/ML
ANTI-JO-1 IGG: <0.2 AI
ANTI-NUCLEAR ANTIBODY (ANA): POSITIVE
ANTI-RIBOSOMAL P: <0.2 AI
ANTI-RNP: <0.2 AI
ANTI-SCL-70: <0.2 AI
ANTI-SM/RNP: <0.2 AI
ANTI-SM: <0.2 AI
ANTI-SSA: <0.2 AI
ANTI-SSB: <0.2 AI

## 2023-09-18 ENCOUNTER — PATIENT MESSAGE (OUTPATIENT)
Dept: PRIMARY CARE | Facility: CLINIC | Age: 58
End: 2023-09-18
Payer: COMMERCIAL

## 2023-09-18 DIAGNOSIS — G43.809 OTHER MIGRAINE WITHOUT STATUS MIGRAINOSUS, NOT INTRACTABLE: ICD-10-CM

## 2023-09-18 DIAGNOSIS — M25.50 ARTHRALGIA, UNSPECIFIED JOINT: Primary | ICD-10-CM

## 2023-09-18 DIAGNOSIS — M79.10 MYALGIA: ICD-10-CM

## 2023-09-18 DIAGNOSIS — R76.8 POSITIVE ANA (ANTINUCLEAR ANTIBODY): ICD-10-CM

## 2023-09-19 RX ORDER — SUMATRIPTAN 50 MG/1
50 TABLET, FILM COATED ORAL ONCE AS NEEDED
Qty: 9 TABLET | Refills: 2 | Status: SHIPPED | OUTPATIENT
Start: 2023-09-19 | End: 2023-10-23 | Stop reason: SDUPTHER

## 2023-09-25 ENCOUNTER — HOSPITAL ENCOUNTER (OUTPATIENT)
Dept: DATA CONVERSION | Facility: HOSPITAL | Age: 58
End: 2023-09-25
Payer: COMMERCIAL

## 2023-09-25 DIAGNOSIS — G47.33 OBSTRUCTIVE SLEEP APNEA (ADULT) (PEDIATRIC): ICD-10-CM

## 2023-09-25 LAB — POCT GLUCOSE: 89 MG/DL (ref 74–99)

## 2023-09-30 NOTE — H&P
History & Physical Reviewed:   Pregnant/Lactating:  ·  Are You Pregnant no   ·  Are You Currently Breastfeeding no     I have reviewed the History and Physical dated:  25-Sep-2023   History and Physical reviewed and relevant findings noted. Patient examined to review pertinent physical  findings.: No significant changes   Home Medications Reviewed: no changes noted   Allergies Reviewed: no changes noted       ERAS (Enhanced Recovery After Surgery):  ·  ERAS Patient: no     Consent:   COVID-19 Consent:  ·  COVID-19 Risk Consent Surgeon has reviewed key risks related to the risk of fabienne COVID-19 and if they contract COVID-19 what the risks are.     Attestation:   Note Completion:  I am a:  Resident/Fellow   Attending Attestation I saw and evaluated the patient.  I personally obtained the key and critical portions of the history and physical exam or was physically present for key and  critical portions performed by the resident/fellow. I reviewed the resident/fellow?s documentation and discussed the patient with the resident/fellow.  I agree with the resident/fellow?s medical decision making as documented in the note.     I personally evaluated the patient on 25-Sep-2023         Electronic Signatures:  Diana Mckoy)  (Signed 27-Sep-2023 14:02)   Authored: Note Completion   Co-Signer: History & Physical Reviewed, ERAS, Consent, Note Completion  Joie Sosa (Resident))  (Signed 25-Sep-2023 10:52)   Authored: History & Physical Reviewed, ERAS, Consent,  Note Completion      Last Updated: 27-Sep-2023 14:02 by Diana Mckoy)

## 2023-10-01 NOTE — OP NOTE
PROCEDURE DETAILS    Preoperative Diagnosis:  RAMESH  Postoperative Diagnosis:  RAMESH  Surgeon: Jens  Resident/Fellow/Other Assistant: Ian    Procedure:  1. Drug induced sleep endoscopy  Estimated Blood Loss: 0cc  Findings: See below        Operative Report:   Indications: This is a patient with a  history of RAMESH who is noncompliant with CPAP. She is interested in surgical alternatives to the treatment of RAMESH. R/B/A were discussed and informed consent was signed for the above procedure.      Procedure:     The patient was taken back to the OR by anesthesia and laid supine throughout the case. Oxymetazoline was sprayed in bilateral nares to allow nasal decongestion. The flexible laryngoscope was inserted through bilateral nares to document nasal airway.  She was sedated using propofol titrated to effect. Next, the scope was passed through the nasal cavity into the nasopharynx, oropharynx, and pharynx. Findings are as below:     Site  Collapse  AP  Lateral Concentric   Velum 2  2  Oropharynx  0  Tongue base 1  1    Epiglottis 0    The most significant finding was collapse  of the tongue base and velum. This was not complete collapse, however. The lowest O2 was 95%. The airway significantly stabilized with jaw thrust. There was no concentric collapse.     She was awakened by anesthesia and taken back to PACU in stable condition.                         Attestation:   Note Completion:  Attending Attestation I was present for the entire procedure    I am a: Resident/Fellow         Electronic Signatures:  Diana Mckoy)  (Signed 27-Sep-2023 14:03)   Authored: Note Completion   Co-Signer: Post-Operative Note, Chart Review, Note Completion  Joie Sosa (Resident))  (Signed 25-Sep-2023 11:30)   Authored: Post-Operative Note, Chart Review, Note Completion      Last Updated: 27-Sep-2023 14:03 by Diana Mckoy)

## 2023-10-02 ENCOUNTER — APPOINTMENT (OUTPATIENT)
Dept: OBSTETRICS AND GYNECOLOGY | Facility: CLINIC | Age: 58
End: 2023-10-02
Payer: COMMERCIAL

## 2023-10-02 ENCOUNTER — APPOINTMENT (OUTPATIENT)
Dept: OTOLARYNGOLOGY | Facility: CLINIC | Age: 58
End: 2023-10-02
Payer: COMMERCIAL

## 2023-10-04 ENCOUNTER — APPOINTMENT (OUTPATIENT)
Dept: OTOLARYNGOLOGY | Facility: CLINIC | Age: 58
End: 2023-10-04
Payer: COMMERCIAL

## 2023-10-11 ENCOUNTER — APPOINTMENT (OUTPATIENT)
Dept: NEUROLOGY | Facility: CLINIC | Age: 58
End: 2023-10-11
Payer: COMMERCIAL

## 2023-10-11 ENCOUNTER — TELEPHONE (OUTPATIENT)
Dept: PHYSICAL MEDICINE AND REHAB | Facility: CLINIC | Age: 58
End: 2023-10-11

## 2023-10-12 ENCOUNTER — TELEPHONE (OUTPATIENT)
Dept: PHYSICAL MEDICINE AND REHAB | Facility: CLINIC | Age: 58
End: 2023-10-12
Payer: COMMERCIAL

## 2023-10-12 NOTE — TELEPHONE ENCOUNTER
LVM for, pt opening 10/16 in [facility], if she would like she can call back and see If it is still available

## 2023-10-13 NOTE — PROGRESS NOTES
Dear Dr. Rosado, Dr. Sanchez,    I had the pleasure of seeing your patient, Kailyn Lopez, in clinic today.  As you know, she is a pleasant 57-year-old right-handed woman with past medical history significant for anxiety, Hui's esophagus, BPPV, migraine, GERD, insomnia, hypothyroidism, IBS, RAMESH, who presents for evaluation diffuse chronic pain.    TIMELINE OF COMPLAINT(S):    She feels that all of her issues are tied together including the fact that she was diagnosed with Hashimoto's disease in 1994, chronic idiopathic urticaria, menopause in the past few years, etc., leading to diffuse chronic pain beginning about 3 years ago.  There was no inciting or traumatic event, but the pain started about 3 years ago she has multiple complaints:.     -Sensitive to touch everywhere in the past 3 years, increased aches and pains, may be associated with a bout of BPPV in 2020/2021, which took a while to recover from  -Increase grinding, jaw and head pain even with her bike ride  -Bilateral neck and elbow pain  -Weight continue rupture in June 2022 without a traumatic event, status post surgical repair, increase sedentary lifestyle for 4 months after this, and some medial forefoot and medial lower leg nerve pain since then.    -Low back and hip pain also   -Hard to sleep on both sides due to bursitis pain    Most bothersome is the upper back, jaw and forehead pain.      She is try to lose weight, about 30 pounds in the past 8 months according to the patient.      Pain:  LOCATION- Headaches, jaw, neck, upper back and bilateral arm, medial RLE  RADIATION-  ASSOCIATED WITH-None  NUMBNESS/TINGLING- R medial foot and lower leg  WEAKNESS- No  CONSTANT or INTERMITTENT- Constant  SEVERITY/QUANTITY- 5  QUALITY- Achy  EXACERBATED BY- Over doing it  BETTER WITH- Relax, ice  TRIED-       Anti-Inflammatories: (Allergy to NSAIDs), recently Celebrex prescribed  but hasn't started it yet, drug challenge scheduled for 11/17. previously Medrol  Dosepak didn't help      Muscle relaxants: Flexeril helps a little but causes grogginess, previously Robaxin helps better than flexeril, tizanidine this causes nightmares      Anti-depressants:      Neuroleptics: Previously gabapentin prescribed but she never took it      LDN:    PHYSICAL THERAPY: Spring of 2021, PT again 1 year ago after peroneal rupture surgery. Minimal upper body work. Helped ROM and pain a little. No HEP.   TENS unit: No  CHIROPRACTIC MANIPULATION: No  ACUPUNCTURE TREATMENTS: No  DEEP TISSUE MASSAGE THERAPY: Yes  OSTEOPATHIC MANIPULATION THERAPY: No  INJECTIONS: Elbow injections  EMG/NCS:  [facility] 8/25/2023 report shows mild chronic motor axon loss changes in the right flexor digitorum longus muscle and isolation which is insufficient for a diagnosis.  No evidence of large fiber sensorimotor polyneuropathy in the lower extremities, no evidence of right L3, L4-S1 motor radiculopathy.  There is presence of chronic neurogenic motor unit potential changes limited to the intrinsic foot muscles which are of unclear significance and may be related to local foot trauma secondary to shoe wear.    IMAGING: Yes, personally reviewed and interpreted today.    === 07/28/21 ===    MR BRAIN W AND WO CONTRAST    - Impression -  No acute intracranial abnormality was identified      == 03/02/23 ===    XR SHOULDER 2+ VIEWS RIGHT    - Impression -  Negative exam.    Cervical spine xray 1/19/23:  Straining of the normal cervical lordosis.  There are severe degenerative   disc disease C5-C6 and moderate degenerative disc disease C6-C7,   progressed from 12/07/2009.  There is left-sided facet arthropathy at   C3-C4 and C4-C5.  There is no prevertebral soft tissue swelling.       Lab Results   Component Value Date    HGBA1C 5.7 08/02/2023       FUNCTIONAL HISTORY: The patient is independent in all ADLs, mobility, and driving. The patient does not use any assistive device.    SH:  Lives in: [place]  Lives with:  Self  Occupation: IT  Tobacco: No  Alcohol: No  Drugs: No    ROS: The patient denies any bowel or bladder incontinence/accidents, night sweats, fevers, chills, recent significant weight loss. A 14 point review of systems was reviewed with the patient and is as above and otherwise negative.  ROS questionnaire positive for fatigue, headache, constipation, muscle pain/tenderness, stiff and achy back    Physical Exam  Constitutional:              GEN - Alert, well-developed, well-nourished, no acute distress  PSYCH - Cooperative, appropriate mood and affect  HEENT - NC/AT  RESP - Non-labored respirations, equal expansion  CV - warm and well-perfused, No cyanosis or edema in extremities. DP pulses 2+ bilaterally  ABD- soft, ND, overweight  SKIN - No rash.    There was significant tenderness essentially in every area palpated in the upper back, periscapular muscles, lumbar paraspinal, gluteal muscles, thighs, arms, elbows, knees and shins, ankles    NEURO -   UE strength 5/5 -  including shoulder abduction, biceps, triceps, wrist extensors, finger flexors, interossei, and    LE strength 5/5 -  including hip flexors, knee flexors, knee extensors, ankle dorsiflexors, ankle plantar flexors, and EHL   Sensation - intact to light touch in bilateral upper and lower extremities except diminished light touch in the right DP space and right medial ankle compared to left  Reflexes - 2+ biceps, brachioradialis, triceps, patellar and Achilles reflexes bilaterally No Clonus, No Babinski, Sparrow's negative bilaterally  GAIT - Normal base, normal stride length, non-antalgic. Able to toe walk with some difficulty due to right ankle pain and she was able to heel walk without difficulty.  Very stiff neck and back    IMPRESSION:    This  is a pleasant 57-year-old right-handed woman with past medical history significant for anxiety, Hui's esophagus, BPPV, migraine, GERD, insomnia, hypothyroidism, IBS, RAMESH, who presents for  evaluation of diffuse chronic pain.  Symptoms and physical exam findings in this complex patient are likely multifactorial in nature and due to combination of multilevel cervical and lumbar spondylosis, possible radiculopathy component, reactive myofascial pain/tension, possibly tibial neuropathy, fibromyalgia, exacerbated by sedentary lifestyle.    -Patient Education: Extensive time was spent educating the patient on relevant anatomy, clinical findings and imaging, as well as discussing the potential diagnoses as discussed above.   -Proceed with Celebrex challenge  -Consider other medications in the future including gabapentin, Lyrica, Topamax, other muscle relaxants, nortriptyline etc.  -Physical therapy referral provided for active strengthening exercises, some exercises provided as well  -Information about fibromyalgia provided  -We will try to get your EMG report from [facility]  -Consider trigger point injections (handout provided), bursa injections  - consider cervical spine MRI, lumbar MRI, referral for spine injections  - follow-up 6 to 8 weeks, we can consider trigger point injections at this visit        The patient expressed understanding and agreement with the assessment and plan. Patient encouraged to contact us should they have any questions, concerns, or any changes in symptoms.     Thank you for allowing me to participate in the care of your patient.      ** Dictated with voice recognition software, please forgive any errors in grammar and/or spelling **

## 2023-10-16 ENCOUNTER — CONSULT (OUTPATIENT)
Dept: PHYSICAL MEDICINE AND REHAB | Facility: CLINIC | Age: 58
End: 2023-10-16
Payer: COMMERCIAL

## 2023-10-16 ENCOUNTER — APPOINTMENT (OUTPATIENT)
Dept: OTOLARYNGOLOGY | Facility: CLINIC | Age: 58
End: 2023-10-16
Payer: COMMERCIAL

## 2023-10-16 VITALS
BODY MASS INDEX: 33.59 KG/M2 | TEMPERATURE: 97.1 F | SYSTOLIC BLOOD PRESSURE: 114 MMHG | OXYGEN SATURATION: 97 % | DIASTOLIC BLOOD PRESSURE: 78 MMHG | HEIGHT: 66 IN | WEIGHT: 209 LBS | HEART RATE: 82 BPM

## 2023-10-16 DIAGNOSIS — M47.812 CERVICAL SPONDYLOSIS: ICD-10-CM

## 2023-10-16 DIAGNOSIS — M46.1 SACROILIAC INFLAMMATION (CMS-HCC): ICD-10-CM

## 2023-10-16 DIAGNOSIS — M79.7 FIBROMYALGIA: ICD-10-CM

## 2023-10-16 DIAGNOSIS — M70.61 TROCHANTERIC BURSITIS OF RIGHT HIP: ICD-10-CM

## 2023-10-16 DIAGNOSIS — M79.18 MYOFASCIAL PAIN: ICD-10-CM

## 2023-10-16 DIAGNOSIS — M54.59 MECHANICAL LOW BACK PAIN: Primary | ICD-10-CM

## 2023-10-16 PROCEDURE — 99205 OFFICE O/P NEW HI 60 MIN: CPT | Performed by: PHYSICAL MEDICINE & REHABILITATION

## 2023-10-16 ASSESSMENT — PAIN SCALES - GENERAL: PAINLEVEL: 5

## 2023-10-16 NOTE — LETTER
October 16, 2023     Vianca Sanchez DO  [address] [facility]  [address]  [facility] [address]    Patient: Kailyn Lopez   YOB: 1965   Date of Visit: 10/16/2023       Dear Dr. Vianca Sanchez DO:    Thank you for referring Kailyn Lopez to me for evaluation. Below are my notes for this consultation.  If you have questions, please do not hesitate to call me. I look forward to following your patient along with you.       Sincerely,     Bailey Stauffer MD      CC: No Recipients  ______________________________________________________________________________________    Dear Dr. Rosado, Dr. Sanchez,    I had the pleasure of seeing your patient, Kailyn Lopez, in clinic today.  As you know, she is a pleasant 57-year-old right-handed woman with past medical history significant for anxiety, Hui's esophagus, BPPV, migraine, GERD, insomnia, hypothyroidism, IBS, RAMESH, who presents for evaluation diffuse chronic pain.    TIMELINE OF COMPLAINT(S):    She feels that all of her issues are tied together including the fact that she was diagnosed with Hashimoto's disease in 1994, chronic idiopathic urticaria, menopause in the past few years, etc., leading to diffuse chronic pain beginning about 3 years ago.  There was no inciting or traumatic event, but the pain started about 3 years ago she has multiple complaints:.     -Sensitive to touch everywhere in the past 3 years, increased aches and pains, may be associated with a bout of BPPV in 2020/2021, which took a while to recover from  -Increase grinding, jaw and head pain even with her bike ride  -Bilateral neck and elbow pain  -Weight continue rupture in June 2022 without a traumatic event, status post surgical repair, increase sedentary lifestyle for 4 months after this, and some medial forefoot and medial lower leg nerve pain since then.    -Low back and hip pain also   -Hard to sleep on both sides due to bursitis pain    Most bothersome is the upper back, jaw and forehead pain.       She is try to lose weight, about 30 pounds in the past 8 months according to the patient.      Pain:  LOCATION- Headaches, jaw, neck, upper back and bilateral arm, medial RLE  RADIATION-  ASSOCIATED WITH-None  NUMBNESS/TINGLING- R medial foot and lower leg  WEAKNESS- No  CONSTANT or INTERMITTENT- Constant  SEVERITY/QUANTITY- 5  QUALITY- Achy  EXACERBATED BY- Over doing it  BETTER WITH- Relax, ice  TRIED-       Anti-Inflammatories: (Allergy to NSAIDs), recently Celebrex prescribed  but hasn't started it yet, drug challenge scheduled for 11/17. previously Medrol Dosepak didn't help      Muscle relaxants: Flexeril helps a little but causes grogginess, previously Robaxin helps better than flexeril, tizanidine this causes nightmares      Anti-depressants:      Neuroleptics: Previously gabapentin prescribed but she never took it      LDN:    PHYSICAL THERAPY: Spring of 2021, PT again 1 year ago after peroneal rupture surgery. Minimal upper body work. Helped ROM and pain a little. No HEP.   TENS unit: No  CHIROPRACTIC MANIPULATION: No  ACUPUNCTURE TREATMENTS: No  DEEP TISSUE MASSAGE THERAPY: Yes  OSTEOPATHIC MANIPULATION THERAPY: No  INJECTIONS: Elbow injections  EMG/NCS:  [facility] reportedly normal.    IMAGING: Yes, personally reviewed and interpreted today.    === 07/28/21 ===    MR BRAIN W AND WO CONTRAST    - Impression -  No acute intracranial abnormality was identified      == 03/02/23 ===    XR SHOULDER 2+ VIEWS RIGHT    - Impression -  Negative exam.    Cervical spine xray 1/19/23:  Straining of the normal cervical lordosis.  There are severe degenerative   disc disease C5-C6 and moderate degenerative disc disease C6-C7,   progressed from 12/07/2009.  There is left-sided facet arthropathy at   C3-C4 and C4-C5.  There is no prevertebral soft tissue swelling.       Lab Results   Component Value Date    HGBA1C 5.7 08/02/2023       FUNCTIONAL HISTORY: The patient is independent in all ADLs, mobility, and driving. The  patient does not use any assistive device.    SH:  Lives in: [place]  Lives with: Self  Occupation: IT  Tobacco: No  Alcohol: No  Drugs: No    ROS: The patient denies any bowel or bladder incontinence/accidents, night sweats, fevers, chills, recent significant weight loss. A 14 point review of systems was reviewed with the patient and is as above and otherwise negative.  ROS questionnaire positive for fatigue, headache, constipation, muscle pain/tenderness, stiff and achy back    Physical Exam  Constitutional:              GEN - Alert, well-developed, well-nourished, no acute distress  PSYCH - Cooperative, appropriate mood and affect  HEENT - NC/AT  RESP - Non-labored respirations, equal expansion  CV - warm and well-perfused, No cyanosis or edema in extremities. DP pulses 2+ bilaterally  ABD- soft, ND, overweight  SKIN - No rash.    There was significant tenderness essentially in every area palpated in the upper back, periscapular muscles, lumbar paraspinal, gluteal muscles, thighs, arms, elbows, knees and shins, ankles    NEURO -   UE strength 5/5 -  including shoulder abduction, biceps, triceps, wrist extensors, finger flexors, interossei, and    LE strength 5/5 -  including hip flexors, knee flexors, knee extensors, ankle dorsiflexors, ankle plantar flexors, and EHL   Sensation - intact to light touch in bilateral upper and lower extremities except diminished light touch in the right DP space and right medial ankle compared to left  Reflexes - 2+ biceps, brachioradialis, triceps, patellar and Achilles reflexes bilaterally No Clonus, No Babinski, Sparrow's negative bilaterally  GAIT - Normal base, normal stride length, non-antalgic. Able to toe walk with some difficulty due to right ankle pain and she was able to heel walk without difficulty.  Very stiff neck and back    IMPRESSION:    This  is a pleasant 57-year-old right-handed woman with past medical history significant for anxiety, Hui's esophagus,  BPPV, migraine, GERD, insomnia, hypothyroidism, IBS, RAMESH, who presents for evaluation of diffuse chronic pain.  Symptoms and physical exam findings in this complex patient are likely multifactorial in nature and due to combination of multilevel cervical and lumbar spondylosis, possible radiculopathy component, reactive myofascial pain/tension, possibly tibial neuropathy, fibromyalgia, exacerbated by sedentary lifestyle.    -Patient Education: Extensive time was spent educating the patient on relevant anatomy, clinical findings and imaging, as well as discussing the potential diagnoses as discussed above.   -Proceed with Celebrex challenge  -Consider other medications in the future including gabapentin, Lyrica, Topamax, other muscle relaxants, nortriptyline etc.  -Physical therapy referral provided for active strengthening exercises, some exercises provided as well  -Information about fibromyalgia provided  -We will try to get your EMG report from [facility]  -Consider trigger point injections (handout provided), bursa injections  - consider cervical spine MRI, lumbar MRI, referral for spine injections  - follow-up 6 to 8 weeks, we can consider trigger point injections at this visit        The patient expressed understanding and agreement with the assessment and plan. Patient encouraged to contact us should they have any questions, concerns, or any changes in symptoms.     Thank you for allowing me to participate in the care of your patient.      ** Dictated with voice recognition software, please forgive any errors in grammar and/or spelling **

## 2023-10-16 NOTE — PATIENT INSTRUCTIONS
-Proceed with Celebrex challenge  -Consider other medications in the future including gabapentin, Lyrica, Topamax, other muscle relaxants, nortriptyline etc.  -Physical therapy referral provided for active strengthening exercises, some exercises provided as well  -Information about fibromyalgia provided  -We will try to get your EMG report from [facility]  -Consider trigger point injections (handout provided), bursa injections  - consider cervical spine MRI, lumbar MRI, referral for spine injections  - follow-up 6 to 8 weeks, we can consider trigger point injections at this visit

## 2023-10-16 NOTE — LETTER
October 16, 2023     Oneyda Rosado DO  [address] [facility] [address]    Patient: Kailyn Lopez   YOB: 1965   Date of Visit: 10/16/2023       Dear Dr. Oneyda Rosado DO:    Thank you for referring Kailyn Lopez to me for evaluation. Below are my notes for this consultation.  If you have questions, please do not hesitate to call me. I look forward to following your patient along with you.       Sincerely,     Bailey Stauffer MD      CC: No Recipients  ______________________________________________________________________________________    Dear Dr. Rosado, Dr. Sanchez,    I had the pleasure of seeing your patient, Kailyn Lopez, in clinic today.  As you know, she is a pleasant 57-year-old right-handed woman with past medical history significant for anxiety, Hui's esophagus, BPPV, migraine, GERD, insomnia, hypothyroidism, IBS, RAMESH, who presents for evaluation diffuse chronic pain.    TIMELINE OF COMPLAINT(S):    She feels that all of her issues are tied together including the fact that she was diagnosed with Hashimoto's disease in 1994, chronic idiopathic urticaria, menopause in the past few years, etc., leading to diffuse chronic pain beginning about 3 years ago.  There was no inciting or traumatic event, but the pain started about 3 years ago she has multiple complaints:.     -Sensitive to touch everywhere in the past 3 years, increased aches and pains, may be associated with a bout of BPPV in 2020/2021, which took a while to recover from  -Increase grinding, jaw and head pain even with her bike ride  -Bilateral neck and elbow pain  -Weight continue rupture in June 2022 without a traumatic event, status post surgical repair, increase sedentary lifestyle for 4 months after this, and some medial forefoot and medial lower leg nerve pain since then.    -Low back and hip pain also   -Hard to sleep on both sides due to bursitis pain    Most bothersome is the upper back, jaw and forehead pain.      She is try  to lose weight, about 30 pounds in the past 8 months according to the patient.      Pain:  LOCATION- Headaches, jaw, neck, upper back and bilateral arm, medial RLE  RADIATION-  ASSOCIATED WITH-None  NUMBNESS/TINGLING- R medial foot and lower leg  WEAKNESS- No  CONSTANT or INTERMITTENT- Constant  SEVERITY/QUANTITY- 5  QUALITY- Achy  EXACERBATED BY- Over doing it  BETTER WITH- Relax, ice  TRIED-       Anti-Inflammatories: (Allergy to NSAIDs), recently Celebrex prescribed  but hasn't started it yet, drug challenge scheduled for 11/17. previously Medrol Dosepak didn't help      Muscle relaxants: Flexeril helps a little but causes grogginess, previously Robaxin helps better than flexeril, tizanidine this causes nightmares      Anti-depressants:      Neuroleptics: Previously gabapentin prescribed but she never took it      LDN:    PHYSICAL THERAPY: Spring of 2021, PT again 1 year ago after peroneal rupture surgery. Minimal upper body work. Helped ROM and pain a little. No HEP.   TENS unit: No  CHIROPRACTIC MANIPULATION: No  ACUPUNCTURE TREATMENTS: No  DEEP TISSUE MASSAGE THERAPY: Yes  OSTEOPATHIC MANIPULATION THERAPY: No  INJECTIONS: Elbow injections  EMG/NCS:  [facility] reportedly normal.    IMAGING: Yes, personally reviewed and interpreted today.    === 07/28/21 ===    MR BRAIN W AND WO CONTRAST    - Impression -  No acute intracranial abnormality was identified      == 03/02/23 ===    XR SHOULDER 2+ VIEWS RIGHT    - Impression -  Negative exam.    Cervical spine xray 1/19/23:  Straining of the normal cervical lordosis.  There are severe degenerative   disc disease C5-C6 and moderate degenerative disc disease C6-C7,   progressed from 12/07/2009.  There is left-sided facet arthropathy at   C3-C4 and C4-C5.  There is no prevertebral soft tissue swelling.       Lab Results   Component Value Date    HGBA1C 5.7 08/02/2023       FUNCTIONAL HISTORY: The patient is independent in all ADLs, mobility, and driving. The patient does not  use any assistive device.    SH:  Lives in: [place]  Lives with: Self  Occupation: IT  Tobacco: No  Alcohol: No  Drugs: No    ROS: The patient denies any bowel or bladder incontinence/accidents, night sweats, fevers, chills, recent significant weight loss. A 14 point review of systems was reviewed with the patient and is as above and otherwise negative.  ROS questionnaire positive for fatigue, headache, constipation, muscle pain/tenderness, stiff and achy back    Physical Exam  Constitutional:              GEN - Alert, well-developed, well-nourished, no acute distress  PSYCH - Cooperative, appropriate mood and affect  HEENT - NC/AT  RESP - Non-labored respirations, equal expansion  CV - warm and well-perfused, No cyanosis or edema in extremities. DP pulses 2+ bilaterally  ABD- soft, ND, overweight  SKIN - No rash.    There was significant tenderness essentially in every area palpated in the upper back, periscapular muscles, lumbar paraspinal, gluteal muscles, thighs, arms, elbows, knees and shins, ankles    NEURO -   UE strength 5/5 -  including shoulder abduction, biceps, triceps, wrist extensors, finger flexors, interossei, and    LE strength 5/5 -  including hip flexors, knee flexors, knee extensors, ankle dorsiflexors, ankle plantar flexors, and EHL   Sensation - intact to light touch in bilateral upper and lower extremities except diminished light touch in the right DP space and right medial ankle compared to left  Reflexes - 2+ biceps, brachioradialis, triceps, patellar and Achilles reflexes bilaterally No Clonus, No Babinski, Sparrow's negative bilaterally  GAIT - Normal base, normal stride length, non-antalgic. Able to toe walk with some difficulty due to right ankle pain and she was able to heel walk without difficulty.  Very stiff neck and back    IMPRESSION:    This  is a pleasant 57-year-old right-handed woman with past medical history significant for anxiety, Hui's esophagus, BPPV, migraine, GERD,  insomnia, hypothyroidism, IBS, RAMESH, who presents for evaluation of diffuse chronic pain.  Symptoms and physical exam findings in this complex patient are likely multifactorial in nature and due to combination of multilevel cervical and lumbar spondylosis, possible radiculopathy component, reactive myofascial pain/tension, possibly tibial neuropathy, fibromyalgia, exacerbated by sedentary lifestyle.    -Patient Education: Extensive time was spent educating the patient on relevant anatomy, clinical findings and imaging, as well as discussing the potential diagnoses as discussed above.   -Proceed with Celebrex challenge  -Consider other medications in the future including gabapentin, Lyrica, Topamax, other muscle relaxants, nortriptyline etc.  -Physical therapy referral provided for active strengthening exercises, some exercises provided as well  -Information about fibromyalgia provided  -We will try to get your EMG report from [facility]  -Consider trigger point injections (handout provided), bursa injections  - consider cervical spine MRI, lumbar MRI, referral for spine injections  - follow-up 6 to 8 weeks, we can consider trigger point injections at this visit        The patient expressed understanding and agreement with the assessment and plan. Patient encouraged to contact us should they have any questions, concerns, or any changes in symptoms.     Thank you for allowing me to participate in the care of your patient.      ** Dictated with voice recognition software, please forgive any errors in grammar and/or spelling **

## 2023-10-23 ENCOUNTER — TELEMEDICINE (OUTPATIENT)
Dept: NEUROLOGY | Facility: CLINIC | Age: 58
End: 2023-10-23
Payer: COMMERCIAL

## 2023-10-23 DIAGNOSIS — G43.711 CHRONIC MIGRAINE WITHOUT AURA, WITH INTRACTABLE MIGRAINE, SO STATED, WITH STATUS MIGRAINOSUS: Primary | ICD-10-CM

## 2023-10-23 DIAGNOSIS — G43.809 OTHER MIGRAINE WITHOUT STATUS MIGRAINOSUS, NOT INTRACTABLE: ICD-10-CM

## 2023-10-23 PROCEDURE — 99214 OFFICE O/P EST MOD 30 MIN: CPT | Performed by: NURSE PRACTITIONER

## 2023-10-23 RX ORDER — SUMATRIPTAN 50 MG/1
50 TABLET, FILM COATED ORAL AS NEEDED
Qty: 9 TABLET | Refills: 11 | Status: SHIPPED | OUTPATIENT
Start: 2023-10-23

## 2023-10-23 NOTE — PROGRESS NOTES
Patient being assessed today for follow-up of migraine.  She reports that she at times needs to use the sumatriptan 2 times per week.  She is limited to when she can use it as it causes side effects such as lethargy.  She reports that she is doing better with the Botox and feels that it is effective in controlling her migraine activity.  Would like to try her on Ubrelvy for abortive treatment to see if that is more helpful for her.  The sumatriptan she can use when she is at home and does not have to worry about driving.  Continue with Botox.  Discussed role of medicine, importance of taking medications, potential risks, benefits, and precautions to be taken.  Reviewed sleep hygiene and dietary modifications.  Follow-up in 1 year.    This note was created with voice recognition software and was not corrected for typographical or grammatical errors

## 2023-10-26 NOTE — PROGRESS NOTES
Post op visit to discuss results of DISE    Site        Collapse             AP         Lateral   Concentric   Velum                2             2  Oropharynx       0  Tongue base       1            1              Epiglottis 0     The most significant finding was collapse  of the tongue base and velum. This was not complete collapse, however. The lowest O2 was 95%. The airway significantly stabilized with jaw thrust. There was no concentric collapse.     After detailed discussion it is decided for:   As patient has mild RAMESH we will try a clinical approach.     - ExciteOSA    - It is explained to patient that if weight loss is achieved it can significantly improve RAMESH symptoms such as daytime sleepiness, irritability. There is also an overall improvement in cardiovascular and metabolic health. The loss of just 10-15% can reduce the severity of RAMESH by 50% in moderately obese patients. Patient is referred to Medical Weight Management program to help achieve and maintain weight loss success.          Patient Discussion/Summary    Patient has been struggling w PAP and is not capable of adapting although several trials   - New home sleep test to quantify RAMESH severity and guide decision making process.  - I will proceed with DISE to characterize level and mechanism of obstruction besides determine patient's candidacy for Inspire therapy.  Patient might be candidate for UPPP, HNS or Airlift (DISE and new sleep study will guide clinical decision)      Chief Complaint    Follow up visit -2month      Adult Risk ScreeningAdult Risk Screening_UH: There are no spiritual/cultural practices/values/needs that are important to know   Initial Fall Risk Screening:   JANNA has not fallen in the last 6 months. JANNA does not have a fear of falling. She does not need assistance with sitting, standing or walking. Does not need assistance walking in her home. She does not need assistance in an unfamiliar setting. The patient is not using  an assistive device.   Pain Scale: On a scale of 0 to 10, the patient rates the pain at 0.        Tobacco Screening: JANNA does not use tobacco. Has not used tobacco in the past 6 months.        History of Present Illness  JANNA SANCHEZ is a 57 year female referred to me today for further evaluation and treatment of septum issues, sleep issues.     Patient has previously been seen by Dr. Ricci of this office in the past and had a HST completed which showed:  Home sleep test was completed on July 4, 2021  [place] at the time 7 out of 24, BMI at the time 34.7  Sleep total monitoring time 590 minutes  Very minimal nonsupine sleep  The central apnea index was 0.4, mixed apnea index was 0  The AHI based on 3% criteria is 21.1  The patient spent 0.2% of study time below 90  Oxygen dmitriy 88.6%  ALIE 4% was 7.7    Used a mouthguard for the sleep apnea, but this aggravated her TMJ issues as well.     She also has previously seen Dr. Fleming for sinonasal issues with his last visit on June 13, 2022. At that time, she had discontinued sprays as she was working with TMJ issues and deriving benefit from this.     I have personally reviewed PMH, PSH, FH and SH which are provided in the attached Patient Questionnaire which is scanned into the electronic medical record.      06/23/23  Patient reports couldn't tolerate nasal mask. It created nasal irritation.   Tried full face mask but reports it trigger migraines.    10/27 Follow up visit  Home sleep test completed on 7/29/2023  AHI3% was 11.1 with supine at 13.5 and non supine at 5.0 events per hour  AHI4% was 7.0 with supine at 8.8 and non supine at 2.7 events per hour  Pt spent 3.2% of study time with oxygenb saturations <= 90%  SPO2 dmitriy was 82.4%         Active Problems   · Allergic rhinitis (477.9) (J30.9)   · Angioedema (995.1) (T78.3XXA)   · Possible hereditary angioedema brought on by the birth control pill   · Anxiety (300.00) (F41.9)   · Arthralgia of left elbow (719.42)  (M25.522)   · Asymmetric SNHL (sensorineural hearing loss) (389.16) (H90.3)   · Hui's esophagus without dysplasia (530.85) (K22.70)   · short segment on EGD 8-21-20   · Benign paroxysmal positional vertigo of left ear (386.11) (H81.12)   · Breast cancer screening (V76.10) (Z12.39)   · Bursitis of right hip (726.5) (M70.71)   · Chronic constipation (564.00) (K59.09)   · Chronic migraine without aura, with intractable migraine, so stated, with status  migrainosus (346.73) (G43.711)   · Chronic rhinitis (472.0) (J31.0)   · Class 2 obesity with body mass index (BMI) of 38.0 to 38.9 in adult (278.00,V85.38)  (E66.9,Z68.38)   · Convergence anomaly   · Deviated nasal septum (470) (J34.2)   · Dizziness (780.4) (R42)   · Ear popping (388.8) (H93.8X9)   · Ear pressure, bilateral (388.8) (H93.8X3)   · Facial pain (784.0) (R51.9)   · Facial pressure (782.0) (R44.8)   · GERD (gastroesophageal reflux disease) (530.81) (K21.9)   · Habitual snoring (786.09) (R06.83)   · Hot flashes (782.62) (R23.2)   · Hot flashes, menopausal (627.2) (N95.1)   · Hypothyroidism (244.9) (E03.9)   · murali P[facility] at [facility]   · Insomnia (780.52) (G47.00)   · Insulin resistance (277.7) (E88.81)   · Irritable bowel syndrome with predominant constipation (564.1) (K58.1)   · Lateral epicondylitis of left elbow (726.32) (M77.12)   · Lumbar pain (724.2) (M54.50)   · Mechanical low back pain (724.2) (M54.59)   · Medial epicondylitis of left elbow (726.31) (M77.02)   · Migraine (346.90) (G43.909)   · Nasal congestion (478.19) (R09.81)   · Nasal congestion with rhinorrhea (478.19) (R09.81,J34.89)   · Nasal drainage (478.19) (J34.89)   · Nasal septal deviation (470) (J34.2)   · Neck pain on left side (723.1) (M54.2)   · Obstructive sleep apnea (adult) (pediatric) (327.23) (G47.33)   · moderate on home sleep study   · Oromandibular dystonia (333.82) (G24.4)   · Perimenopausal (627.2) (N95.1)   · Postnasal drip (784.91) (R09.82)   · Prediabetes (790.29)  (R73.03)   · Right hip pain (719.45) (M25.551)   · Sacroiliac inflammation (720.2) (M46.1)   · Segmental and somatic dysfunction of lumbar region (739.3) (M99.03)   · TMJ pain dysfunction syndrome (524.69) (M26.629)   · Trochanteric bursitis of right hip (726.5) (M70.61)   · Urticaria, idiopathic (708.1) (L50.1)   · Vertigo (780.4) (R42)   · Vitamin D deficiency (268.9) (E55.9)   · Wellness examination (V70.0) (Z00.00)    Past Medical History   · History of Adjustment insomnia (307.41) (F51.02)   · Resolved Date: 19 Jul 2021   · History of BMI 32.0-32.9,adult (V85.32) (Z68.32)   · Resolved Date: 15 Bong 2023   · History of Class 2 obesity with body mass index (BMI) of 39.0 to 39.9 in adult  (278.00,V85.39) (E66.9,Z68.39)   · Resolved Date: 01 Feb 2023   · History of Dyspepsia (536.8) (R10.13)   · Resolved Date: 19 Jul 2021   · History of acute otitis media (V12.49) (Z86.69)   · Resolved Date: 15 Bong 2023   · History of acute sinusitis (V12.69) (Z87.09)   · Resolved Date: 15 Bong 2023   · History of chronic diarrhea   · Resolved Date: 15 Bong 2023   · History of cough   · Resolved Date: 15 Bong 2023   · History of headache (V13.89) (Z87.898)   · Resolved Date: 19 Jul 2021   · History of mammogram (V15.89) (Z92.89)   · last 2019   · History of neck pain (V13.59) (Z87.39)   · Resolved Date: 19 Jul 2021   · History of Papanicolaou smear (V45.89) (Z98.890)   · last 2019   · History of screening mammography (V15.89) (Z92.89)   · Resolved Date: 19 Jul 2021   · History of Obesity (BMI 30-39.9) (278.00) (E66.9)   · Resolved Date: 01 Feb 2023   · History of Perimenopausal (627.2) (N95.1)   · Resolved Date: 30 Jul 2021   · History of Rash (782.1) (R21)   · Resolved Date: 26 May 2020   · History of Vertigo, benign positional (386.11) (H81.10)   · Resolved Date: 19 Jul 2021    Surgical History   · History of Colonoscopy   · 8-21-20: 1 tubular polyp   · History of Esophagogastroduodenoscopy   · 8-21-20: bx suggestive of Hui's   ·  History of Nasal septoplasty    Family History   · Family history of arthritis (V17.7) (Z82.61)   · Family history of migraine headaches (V17.2) (Z82.0)   · Family history of Allergies   · Family history of CAD (coronary artery disease), native coronary artery   ·  at 77 yoa, first MI in his 50's, was a smoker   · Family history of Allergies   · Family history of migraine headaches (V17.2) (Z82.0)   · Family history of arthritis (V17.7) (Z82.61)   · Family history of congestive heart failure (V17.49) (Z82.49)   ·  in her 90's   · Family history of migraine headaches (V17.2) (Z82.0)   · Family history of arthritis (V17.7) (Z82.61)   · Family history of CAD (coronary artery disease), native coronary artery   ·  at 77 yoa, first MI in his 50's, was a smoker    Social History   · Always uses seat belt   ·  (V61.03) (Z63.5)   · has a boyfriend, lives in lower half of [facility], Mom lives in upper half, no children   · Former smoker (V15.82) (Z87.891)   · 1/4 ppd x 3 yr., quit ~    · Full-time employment   · 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[employer], : working from home   · Denied: History of domestic violence   · No illicit drug use   · Social alcohol use (V49.89) (Z78.9)    Allergies   · Aspirin TABS  Hives;; Recorded By: Cecilia Rush; 2020 9:10:33 PM   · ACE Inhibitors  Recorded By: Emily Live; 2021 10:11:26 AM   · Estrogens  Recorded By: Emily Live; 2021 10:11:26 AM   · NSAIDs  Recorded By: Emily Live; 2021 10:11:26 AM   · Relafen  Recorded By: Britni Hui; 2015 6:29:35 PM    Current Meds    Medication Name Instruction   Albuterol Sulfate  (90 Base) MCG/ACT Inhalation Aerosol Solution INHALE 1 TO 2 PUFFS EVERY 4 TO 6 HOURS AS NEEDED.   Azelastine HCl - 0.1 % Nasal Solution INSTILL 1 SQUIRT Bedtime One spray into each nostril at bedtime   Carbinoxamine Maleate 4 MG Oral Tablet    Citalopram Hydrobromide 20 MG Oral Tablet TAKE 1  TABLET DAILY.   Estradiol 0.5 MG Oral Tablet TAKE 1 TABLET BY MOUTH EVERY DAY   Famotidine 20 MG Oral Tablet TAKE 1 TABLET DAILY AS DIRECTED.   Linzess 290 MCG Oral Capsule TAKE 1 CAPSULE Daily   Liothyronine Sodium 5 MCG Oral Tablet 1/2 daily   metFORMIN HCl  MG Oral Tablet Extended Release 24 Hour TAKE 1 TABLET BY MOUTH EVERY DAY WITH EVENING MEAL   Omeprazole 40 MG Oral Capsule Delayed Release TAKE ONE CAPSULE BY MOUTH EVERY DAY IN THE MORNING. 60 min BEFORE a MEAL prn   Ozempic (0.25 or 0.5 MG/DOSE) 2 MG/1.5ML SOPN INJECT 0.25MG UNDER THE SKIN WEEKLY FOR 4 WEEKS THEN INCREASE TO 0.5MG WEEKLY   Progesterone 100 MG Oral Capsule TAKE 1 CAPSULE DAILY AT NIGHT BEFORE BED   SUMAtriptan Succinate 50 MG Oral Tablet with onset of headache TAKE 1 TABLET FOR MIGRAINE RELIEF. MAY REPEAT EVERY 2 HOURS MAXIMUM  MG PER DAY   Synthroid 100 MCG Oral Tablet TAKE 1 TABLET BY MOUTH EVERY DAY   Trulance 3 MG Oral Tablet Take 1 tablet daily   Vitamin D3 125 MCG (5000 UT) Oral Capsule TAKE 1 CAPSULE Daily   Xhance 93 MCG/ACT Nasal Exhaler Suspension ONE SPRAY IN EACH  NOSTRIL TWICE A DAY     Vitals  Vital Signs    Recorded: 23Jun2023 12:42PM   Temperature 97.7 F, Temporal   Heart Rate 75   Systolic 125, LUE, Sitting   Diastolic 82, LUE, Sitting   Blood Pressure Cuff Size Adult   Height 5 ft 6 in   Weight 217 lb    BMI Calculated 35.02 kg/m2   BSA Calculated 2.07   Tobacco Use b) No     Physical Exam  GENERAL: Well-developed, well-nourished.   EYES: Ocular motility intact.  EARS: Otoscopy of external auditory canals and tympanic membranes is normal with clinical speech reception thresholds grossly intact. No mass/lesion of auricle.   NOSE:   Anterior rhinoscopy shows there is septal deviation to the right and bilateral inferior turbinate hypertrophy.  Right Turbinate: 3  Left Turbinate: 3  Nasal Valve collapse: No, Nash Maneuver is negative  Nasal mucosa is unremarkable. There are no other masses polyps or purulent  drainage.      NECK: No cervical lymphadenopathy, no neck mass/crepitus/ asymmetry, trachea is midline, no thyroid enlargement/tenderness/mass.      OROPHARYNX:   Tonsil size: 1-2+  Modified Mallampati tongue position: 1+  Tongue position is Freidman 1  Scalloping is noted.   Soft Palate: is low-lying; is redundant, is not edematous, is not erythematous  Uvula: elongated , is redundant.    MAXILLOFACIAL:   On frontal repose, patient shows symmetrical facial thirds and Symmetrical facial fifths.  There is not paranasal flattening.  There is not deep nasolabial folds.     On profile view, the patient has a convex facial profile, with a Class 1 Facial Skeletal relationship, with adequate chin and lower lip. There is not a dorsal nasal hump. The nasolabial angle is 90 degrees.      DENTAL:  The occlusal plane is steep. No Overjet, no Overbite.  Right - Class 1 canine, Class 1 molar  Left - Class 1 canine, Class 1 molar  There is not transverse discrepancy. Maxilla is not narrow.   The maxilla is not highly-arched.   Tongue shows teeth indentations.   Dentition: There is no missing dentition. There is no dental crowding.   Smile is gummy.      TMJ:  On TMJ examination, the maximal incisal opening is about 45 mm. The jaw does somewhat deviate upon opening. There is not TMJ click/pop. There is no TMJ tenderness upon function.      NEURO/PSYCH: Cranial nerves grossly intact, oriented x3 (time, place, person), appropriate mood and affect.      RESPIRATION: Symmetric expansion during respiration, normal respiratory effort.      CARDIOVASCULAR: Pulse is regular rhythm and rate      Procedure: Diagnostic Nasal/ Pharyngeal Endoscopy     Indication for procedure: To evaluate static and dynamic upper anatomy not visible by anterior rhinoscopy and oral cavitiy examination for anatomic risk factors of obstructive sleep apnea.      Anesthesia: 1% phenylephrine,4% lidocaine topical spray     Description:   A flexible endoscope was  used to examine the left and right nasal cavities. The nasal valve areas were examined for abnormalities or collapse. The inferior and middle turbinates were evaluated and abnormalities noted. The scope was then passed through the nasopharygneal, oropharyngeal, and hypopharyngeal airway. The Parada Maneuver was performed with the patient in sitting position.Collapse was assessed during a maximal inspiratory effort against a closed mouth and sealed nose. The patient tolerated the procedure without complications and was returned to ambulatory status.      Findings:   There is rightward septal deivation and bilateral inferior turbinate hypertrophy  Nasopharynx: There is not adenoid hypertrophy.   Oropharynx: There is palatine tonsillar hypertrophy crowding the airway.   With Brewer maneuver, soft palatal collapse is grade 2, lateral pharyngeal wall collapse is grade 2. Tonsils are part of the collapse.   Hypopharynx: There is not lingual tonsillar hypertrophy, with lingual tonsils of Grade 1. Very erythematous and mildly edematous glottic complex consistent with acid reflux changes. Vocal Cord position with Grade 1 view. With Brewer maneuver, base of tongue collapse is grade 1. This is improved with the patient doing a jaw thrust maneuver.

## 2023-10-27 ENCOUNTER — OFFICE VISIT (OUTPATIENT)
Dept: OTOLARYNGOLOGY | Facility: CLINIC | Age: 58
End: 2023-10-27
Payer: COMMERCIAL

## 2023-10-27 VITALS
DIASTOLIC BLOOD PRESSURE: 81 MMHG | RESPIRATION RATE: 16 BRPM | TEMPERATURE: 97.2 F | HEIGHT: 66 IN | HEART RATE: 77 BPM | BODY MASS INDEX: 33.75 KG/M2 | SYSTOLIC BLOOD PRESSURE: 113 MMHG | WEIGHT: 210 LBS

## 2023-10-27 DIAGNOSIS — G47.33 OSA (OBSTRUCTIVE SLEEP APNEA): ICD-10-CM

## 2023-10-27 PROCEDURE — 99214 OFFICE O/P EST MOD 30 MIN: CPT

## 2023-10-27 PROCEDURE — 1036F TOBACCO NON-USER: CPT

## 2023-10-27 PROCEDURE — 3008F BODY MASS INDEX DOCD: CPT

## 2023-10-27 SDOH — ECONOMIC STABILITY: FOOD INSECURITY: WITHIN THE PAST 12 MONTHS, YOU WORRIED THAT YOUR FOOD WOULD RUN OUT BEFORE YOU GOT MONEY TO BUY MORE.: NEVER TRUE

## 2023-10-27 SDOH — ECONOMIC STABILITY: HOUSING INSECURITY
IN THE LAST 12 MONTHS, WAS THERE A TIME WHEN YOU DID NOT HAVE A STEADY PLACE TO SLEEP OR SLEPT IN A SHELTER (INCLUDING NOW)?: NO

## 2023-10-27 SDOH — ECONOMIC STABILITY: INCOME INSECURITY: IN THE LAST 12 MONTHS, WAS THERE A TIME WHEN YOU WERE NOT ABLE TO PAY THE MORTGAGE OR RENT ON TIME?: NO

## 2023-10-27 SDOH — ECONOMIC STABILITY: FOOD INSECURITY: WITHIN THE PAST 12 MONTHS, THE FOOD YOU BOUGHT JUST DIDN'T LAST AND YOU DIDN'T HAVE MONEY TO GET MORE.: NEVER TRUE

## 2023-10-27 ASSESSMENT — PATIENT HEALTH QUESTIONNAIRE - PHQ9
SUM OF ALL RESPONSES TO PHQ9 QUESTIONS 1 AND 2: 0
2. FEELING DOWN, DEPRESSED OR HOPELESS: NOT AT ALL
1. LITTLE INTEREST OR PLEASURE IN DOING THINGS: NOT AT ALL

## 2023-10-27 ASSESSMENT — ENCOUNTER SYMPTOMS
OCCASIONAL FEELINGS OF UNSTEADINESS: 0
DEPRESSION: 0
LOSS OF SENSATION IN FEET: 0

## 2023-10-27 ASSESSMENT — COLUMBIA-SUICIDE SEVERITY RATING SCALE - C-SSRS
2. HAVE YOU ACTUALLY HAD ANY THOUGHTS OF KILLING YOURSELF?: NO
1. IN THE PAST MONTH, HAVE YOU WISHED YOU WERE DEAD OR WISHED YOU COULD GO TO SLEEP AND NOT WAKE UP?: NO
6. HAVE YOU EVER DONE ANYTHING, STARTED TO DO ANYTHING, OR PREPARED TO DO ANYTHING TO END YOUR LIFE?: NO

## 2023-10-27 ASSESSMENT — PAIN SCALES - GENERAL: PAINLEVEL: 0-NO PAIN

## 2023-10-30 ENCOUNTER — APPOINTMENT (OUTPATIENT)
Dept: OTOLARYNGOLOGY | Facility: CLINIC | Age: 58
End: 2023-10-30
Payer: COMMERCIAL

## 2023-11-03 ENCOUNTER — APPOINTMENT (OUTPATIENT)
Dept: PRIMARY CARE | Facility: CLINIC | Age: 58
End: 2023-11-03
Payer: COMMERCIAL

## 2023-11-03 ENCOUNTER — EVALUATION (OUTPATIENT)
Dept: PHYSICAL THERAPY | Facility: CLINIC | Age: 58
End: 2023-11-03
Payer: COMMERCIAL

## 2023-11-03 ENCOUNTER — APPOINTMENT (OUTPATIENT)
Dept: PHYSICAL THERAPY | Facility: CLINIC | Age: 58
End: 2023-11-03
Payer: COMMERCIAL

## 2023-11-03 DIAGNOSIS — M54.50 LOW BACK PAIN: Primary | ICD-10-CM

## 2023-11-03 PROCEDURE — 97161 PT EVAL LOW COMPLEX 20 MIN: CPT | Mod: GP

## 2023-11-03 PROCEDURE — 97535 SELF CARE MNGMENT TRAINING: CPT | Mod: GP

## 2023-11-03 ASSESSMENT — ENCOUNTER SYMPTOMS
DEPRESSION: 0
LOSS OF SENSATION IN FEET: 0
OCCASIONAL FEELINGS OF UNSTEADINESS: 0

## 2023-11-03 ASSESSMENT — PATIENT HEALTH QUESTIONNAIRE - PHQ9
2. FEELING DOWN, DEPRESSED OR HOPELESS: NOT AT ALL
1. LITTLE INTEREST OR PLEASURE IN DOING THINGS: NOT AT ALL
SUM OF ALL RESPONSES TO PHQ9 QUESTIONS 1 AND 2: 0

## 2023-11-03 NOTE — LETTER
November 6, 2023     Patient: Kailyn Lopez   YOB: 1965   Date of Visit: 11/3/2023       To Whom It May Concern:    It is my medical opinion that Kailyn Lopez {Work release (duty restriction):34659}.    If you have any questions or concerns, please don't hesitate to call.         Sincerely,        Daphne Sandoval, PT    CC: No Recipients

## 2023-11-03 NOTE — LETTER
November 6, 2023     Patient: Kailyn Lopez   YOB: 1965   Date of Visit: 11/3/2023       To Whom it May Concern:    Kailyn Lopez was seen in my clinic on 11/3/2023. She {Return to school/sport:10704}.    If you have any questions or concerns, please don't hesitate to call.         Sincerely,          Daphne Sandoval, PT        CC: No Recipients

## 2023-11-03 NOTE — PROGRESS NOTES
Physical Therapy    Physical Therapy Evaluation      Patient Name: Kailyn Lopez  MRN: [id number]  Today's Date: 11/3/2023  Time Calculation  Start Time: 1545  Stop Time: 1625  Time Calculation (min): 40 min      Visit #: 1  Visits Approved: ?  Authorization needed: Yes    Assessment:   Pt is a 57 y.o. female c/o R hip, R LBP, B neck pain, and overall ache d/t fibromyalgia dx. Pt presents with decreased BLE strength R > L, decreased BLE flexibility R > L, + TTP throughout lumbar spine and R hip, hypomobile PA glides in lumbar spine, and decreased lumbar and cervical AROM. These impairments are affecting her standing and sitting for long periods of time, stair climbing, and performing ADLs. Pt is appropriate for skilled PT services to address these impairments and manage fibromyalgia sx.    Plan:  Treatment/Interventions: Biofeedback, Cryotherapy, Dry needling, Education/ Instruction, Electrical stimulation, Gait training, Hot pack, Manual therapy, Mechanical traction, Neuromuscular re-education, Self care/ home management, Taping techniques, Therapeutic activities, Therapeutic exercises, Ultrasound, Vasopneumatic device  PT Plan: Skilled PT  PT Frequency: 1 time per week  Duration: 8 weeks  Rehab Potential: Good  Plan of Care Agreement: Patient    Goals:  Pt will achieve the following goals, in 8 weeks:  Pt will be independent in HEP to maximize PT benefits.  Pt will improve ALIE score by >/= 12% to meet MCID and reduce level of disability.  Pt will reduce average and worst pain rating >/= 2 pts to meet MCID and demonstrate improved QoL.   Pt will engage TA without cues during sitting and standing to improve core stability when performing ADLs.  Pt will improve BLE strength to 5/5 to improve functional mobility with ambulation, squatting, and stooping.     Current Problem:   1. Low back pain  Follow Up In Physical Therapy          Subjective    General:  Referring provider: Dr. Rosado  Reason for visit: R hip and LBP, B  neck, fibromyalgia      Chief complaint: Overall ache in body. R LBP and hip pain, B neck pain. Denies N/T.  HPI: Hashimoto's with inflammatory markers and ruled out any rheumatology issues. > 10 years ago twisted R ankle and June 2022 peroneal tendon rupture with grafting and surgical correction with long history of immobility.  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, WFH 2 days/week sitting   Goals: improve pain     Precautions:   PMH: BPPV, fibromyalgia, Hashimoto's, CIU remission (cannot take NSAIDS)   Pain:   3/10 R LBP       Objective   *pain noted upon testing    Posture:  Forward head, B rounded shoulders, increased lumbar lordosis     Cervical Screen:  Flex: WNL  Ext: WNL  SB: R mod limitations*, L min limitations   Rot: R WNL, L mod limitations*    Lumbar AROM:   Flex: min limitations HS   Ext: mod limitations  RSB: min limitations*  LSB: min limitations    SLS:  R: asymmetrical pelvis drop < 10 sec   L: symmetrical pelvis > 10 sec     Flexibility:  Hamstrings: R severe limitations, L mod-severe limitations  Hip ER: R mod limitations, L mod limitations  Hip IR: R mod limitations, L min limitations  Quads: R mod limitations, L min limitations    Strength:   Hip flex: R 4/5, L 4/5  Hip ABD: R 4/5, L 4/5  Hip ext: R 4-/5, L 4-/5  Knee ext: R 5/5, L 5/5  Knee flex: R 4+/5, L 4+/5  Ankle DF: R 5/5, L 5/5    Palpation:  + TTP R glute med, ITB, glute max, B lumbar paraspinals, QL    Isometric Abdominal Contraction:  Diaphragm dominant contraction in HL     Outcome Measures:  Other Measures  Oswestry Disablity Index (ALIE): 28%     Treatments:  1. Initial evaluation   2. Pt ed on diagnosis, prognosis, goals of PT, expectations   3. HEP (see below) ed  on normal vs abnormal response    Therapeutic Exercise:  Access Code: [email or web address]  URL: [email or web address]  Date: 11/03/2023  Prepared by: Arcelia Sandoval    Exercises  - Seated Hamstring Stretch  - 2 x daily - 7 x weekly - 1 sets - 3 reps - 20 hold  - Supine Piriformis Stretch with Foot on Ground  - 2 x daily - 7 x weekly - 1 sets - 3 reps - 30 hold  - Supine Bridge  - 2 x daily - 7 x weekly - 2 sets - 10 reps - 5 hold  - Supine Lower Trunk Rotation  - 2 x daily - 7 x weekly - 2 sets - 10 reps - 5 hold  - Supine Quadriceps Stretch with Strap on Table  - 2 x daily - 7 x weekly - 2 sets - 10 reps    Education:  Pt educated on pain science, changing positions throughout day at work and stand when possible, and monitoring sx when performing HEP.

## 2023-11-08 ENCOUNTER — OFFICE VISIT (OUTPATIENT)
Dept: PRIMARY CARE | Facility: CLINIC | Age: 58
End: 2023-11-08
Payer: COMMERCIAL

## 2023-11-08 VITALS
HEIGHT: 66 IN | WEIGHT: 206 LBS | DIASTOLIC BLOOD PRESSURE: 80 MMHG | HEART RATE: 69 BPM | BODY MASS INDEX: 33.11 KG/M2 | SYSTOLIC BLOOD PRESSURE: 104 MMHG | OXYGEN SATURATION: 98 %

## 2023-11-08 DIAGNOSIS — E03.9 HYPOTHYROIDISM, UNSPECIFIED TYPE: ICD-10-CM

## 2023-11-08 DIAGNOSIS — R73.03 PREDIABETES: Primary | ICD-10-CM

## 2023-11-08 DIAGNOSIS — K22.70 BARRETT'S ESOPHAGUS WITHOUT DYSPLASIA: ICD-10-CM

## 2023-11-08 DIAGNOSIS — E11.9 TYPE 2 DIABETES MELLITUS WITHOUT COMPLICATION, WITHOUT LONG-TERM CURRENT USE OF INSULIN (MULTI): ICD-10-CM

## 2023-11-08 DIAGNOSIS — M25.50 ARTHRALGIA, UNSPECIFIED JOINT: ICD-10-CM

## 2023-11-08 DIAGNOSIS — N95.1 MENOPAUSE SYNDROME: ICD-10-CM

## 2023-11-08 DIAGNOSIS — K21.00 GASTROESOPHAGEAL REFLUX DISEASE WITH ESOPHAGITIS WITHOUT HEMORRHAGE: ICD-10-CM

## 2023-11-08 DIAGNOSIS — G43.711 CHRONIC MIGRAINE WITHOUT AURA, WITH INTRACTABLE MIGRAINE, SO STATED, WITH STATUS MIGRAINOSUS: ICD-10-CM

## 2023-11-08 DIAGNOSIS — J45.20 INTERMITTENT ASTHMA WITHOUT COMPLICATION, UNSPECIFIED ASTHMA SEVERITY (HHS-HCC): ICD-10-CM

## 2023-11-08 LAB — HBA1C MFR BLD: 5.6 % (ref 4.2–6.5)

## 2023-11-08 PROCEDURE — 3044F HG A1C LEVEL LT 7.0%: CPT | Performed by: STUDENT IN AN ORGANIZED HEALTH CARE EDUCATION/TRAINING PROGRAM

## 2023-11-08 PROCEDURE — 1036F TOBACCO NON-USER: CPT | Performed by: STUDENT IN AN ORGANIZED HEALTH CARE EDUCATION/TRAINING PROGRAM

## 2023-11-08 PROCEDURE — 3008F BODY MASS INDEX DOCD: CPT | Performed by: STUDENT IN AN ORGANIZED HEALTH CARE EDUCATION/TRAINING PROGRAM

## 2023-11-08 PROCEDURE — 3074F SYST BP LT 130 MM HG: CPT | Performed by: STUDENT IN AN ORGANIZED HEALTH CARE EDUCATION/TRAINING PROGRAM

## 2023-11-08 PROCEDURE — 3079F DIAST BP 80-89 MM HG: CPT | Performed by: STUDENT IN AN ORGANIZED HEALTH CARE EDUCATION/TRAINING PROGRAM

## 2023-11-08 PROCEDURE — 99214 OFFICE O/P EST MOD 30 MIN: CPT | Performed by: STUDENT IN AN ORGANIZED HEALTH CARE EDUCATION/TRAINING PROGRAM

## 2023-11-08 PROCEDURE — 83036 HEMOGLOBIN GLYCOSYLATED A1C: CPT | Mod: CLIA WAIVED TEST | Performed by: STUDENT IN AN ORGANIZED HEALTH CARE EDUCATION/TRAINING PROGRAM

## 2023-11-08 ASSESSMENT — ENCOUNTER SYMPTOMS
CARDIOVASCULAR NEGATIVE: 1
ARTHRALGIAS: 1
RESPIRATORY NEGATIVE: 1
PSYCHIATRIC NEGATIVE: 1
GASTROINTESTINAL NEGATIVE: 1
FATIGUE: 1

## 2023-11-08 NOTE — PROGRESS NOTES
Subjective   Patient ID: Kailyn Lopez is a 57 y.o. female who presents for Follow-up (Follow up A1C check).  Did the desensitizaton for estrogen and they increased her HRT. She is on both progesterone and estrogen. Feeling a bit better since starting this.     Saw rheumatology, they felt her lab abnormalities were related to her Hashimoto's.     Had an EMG due to some altered sensations in right foot between first and second toe.     Her headache specialist recommended she try ubrelvy for her migraines instead due to the side effects she gets with sumatriptan. Has not tried this yet.             Review of Systems   Constitutional:  Positive for fatigue.   HENT: Negative.     Respiratory: Negative.     Cardiovascular: Negative.    Gastrointestinal: Negative.    Genitourinary:  Negative for vaginal bleeding.   Musculoskeletal:  Positive for arthralgias.   Psychiatric/Behavioral: Negative.     All other systems reviewed and are negative.      Objective   Physical Exam  Vitals reviewed.   Constitutional:       Appearance: Normal appearance.   HENT:      Head: Normocephalic.      Mouth/Throat:      Mouth: Mucous membranes are moist.   Eyes:      Pupils: Pupils are equal, round, and reactive to light.   Cardiovascular:      Rate and Rhythm: Normal rate and regular rhythm.   Pulmonary:      Effort: Pulmonary effort is normal.      Breath sounds: Normal breath sounds.   Skin:     General: Skin is warm and dry.   Neurological:      General: No focal deficit present.      Mental Status: She is alert. Mental status is at baseline.   Psychiatric:         Mood and Affect: Mood normal.         Behavior: Behavior normal.         Body mass index is 33.25 kg/m².      Current Outpatient Medications:     albuterol 90 mcg/actuation inhaler, Inhale 1-2 puffs every 4 hours if needed for wheezing. EVERY 4 TO 6 HOURS AS NEEDED., Disp: 18 g, Rfl: 3    busPIRone (Buspar) 5 mg tablet, TAKE 2 TABLETS (10 MG) BY MOUTH ONCE DAILY AS NEEDED  (ANXIETY)., Disp: 60 tablet, Rfl: 2    carbinoxamine maleate 4 mg tablet, Take by mouth., Disp: , Rfl:     celecoxib (CeleBREX) 200 mg capsule, Take 1 capsule (200 mg) by mouth 2 times a day., Disp: 60 capsule, Rfl: 2    cholecalciferol (Vitamin D-3) 125 MCG (5000 UT) capsule, Take 1 capsule (5,000 Units) by mouth once daily., Disp: , Rfl:     citalopram (CeleXA) 20 mg tablet, Take 1 tablet (20 mg) by mouth once daily., Disp: , Rfl:     estradiol (Estrace) 1 mg tablet, Take 1 tablet (1 mg) by mouth once daily., Disp: , Rfl:     famotidine (Pepcid) 20 mg tablet, Take 1 tablet (20 mg) by mouth once daily., Disp: , Rfl:     fluticasone propionate (Xhance) 93 mcg/actuation aerosol breath activated, Administer 1 spray into each nostril in the morning and 1 spray before bedtime., Disp: , Rfl:     ipratropium (Atrovent) 21 mcg (0.03 %) nasal spray, Administer 2 sprays into each nostril 3 times a day as needed., Disp: , Rfl:     levothyroxine (Synthroid, Levoxyl) 100 mcg tablet, Take 1 tablet (100 mcg) by mouth once daily., Disp: , Rfl:     liothyronine (Cytomel) 5 mcg tablet, Take 0.5 tablets (2.5 mcg) by mouth once daily., Disp: , Rfl:     metFORMIN XR (metFORMIN, MOD,) 500 mg 24 hr tablet, Take 1 tablet (500 mg) by mouth once daily in the evening. Take with meals. Do not crush, chew, or split., Disp: 90 tablet, Rfl: 1    omeprazole (PriLOSEC) 40 mg DR capsule, Take 1 capsule (40 mg) by mouth once daily as needed. IN THE MORNING. 60 min BEFORE a MEAL, Disp: , Rfl:     pirbuterol acetate (MAXAIR AUTOHALER INHL), 200 mcg., Disp: , Rfl:     progesterone (Prometrium) 100 mg capsule, Take 1 capsule (100 mg) by mouth once daily at bedtime., Disp: , Rfl:     semaglutide (Ozempic) 0.25 mg or 0.5 mg(2 mg/1.5 mL) pen injector, Inject 0.5mg weekly, Disp: 3 mL, Rfl: 2    SUMAtriptan (Imitrex) 50 mg tablet, Take 1 tablet (50 mg) by mouth if needed for migraine for up to 1 dose. FOR MIGRAINE RELIEF. MAY REPEAT EVERY 2 HOURS MAXIMUM OF  200 MG PER DAY, Disp: 9 tablet, Rfl: 11    ubrogepant (Ubrelvy) 100 mg tablet tablet, Take 1 tablet (100 mg) by mouth if needed (At onset of migraine.  Okay to repeat in 2 hours if needed.)., Disp: 16 tablet, Rfl: 3    methylPREDNISolone (Medrol Dospak) 4 mg tablets, Follow schedule on package instructions, Disp: 21 tablet, Rfl: 0      Assessment/Plan   Diagnoses and all orders for this visit:  Prediabetes  Comments:  a1c doing well  Type 2 diabetes mellitus without complication, without long-term current use of insulin (CMS/[facility])  -     POCT Glycosylated Hemoglobin (HGB A1C) docked device  Chronic migraine without aura, with intractable migraine, so stated, with status migrainosus  Comments:  trial of ubrelvy given by her neurologist  Gastroesophageal reflux disease with esophagitis without hemorrhage  Comments:  continue omeprazole  follows with GI for barretts  Hui's esophagus without dysplasia  Intermittent asthma without complication, unspecified asthma severity  Arthralgia, unspecified joint  Comments:  planned desensitization with allergist for celebrex so she can use as needed  Hypothyroidism, unspecified type  Menopause syndrome  Comments:  doing better on higher dose of estrogen  Other orders  -     POCT GLYCOSYLATED HEMOGLOBIN (HGB A1C)    Follow up in 6 months    Oneyda Rosado,

## 2023-11-09 ENCOUNTER — OFFICE VISIT (OUTPATIENT)
Dept: NEUROLOGY | Facility: CLINIC | Age: 58
End: 2023-11-09
Payer: COMMERCIAL

## 2023-11-09 VITALS — BODY MASS INDEX: 33.11 KG/M2 | WEIGHT: 206 LBS | RESPIRATION RATE: 18 BRPM | HEIGHT: 66 IN

## 2023-11-09 DIAGNOSIS — R20.2 PARESTHESIA: Primary | ICD-10-CM

## 2023-11-09 PROCEDURE — 1036F TOBACCO NON-USER: CPT | Performed by: PSYCHIATRY & NEUROLOGY

## 2023-11-09 PROCEDURE — 3008F BODY MASS INDEX DOCD: CPT | Performed by: PSYCHIATRY & NEUROLOGY

## 2023-11-09 PROCEDURE — 99214 OFFICE O/P EST MOD 30 MIN: CPT | Performed by: PSYCHIATRY & NEUROLOGY

## 2023-11-09 ASSESSMENT — PATIENT HEALTH QUESTIONNAIRE - PHQ9
2. FEELING DOWN, DEPRESSED OR HOPELESS: NOT AT ALL
SUM OF ALL RESPONSES TO PHQ9 QUESTIONS 1 AND 2: 0
1. LITTLE INTEREST OR PLEASURE IN DOING THINGS: NOT AT ALL

## 2023-11-09 ASSESSMENT — COLUMBIA-SUICIDE SEVERITY RATING SCALE - C-SSRS
1. IN THE PAST MONTH, HAVE YOU WISHED YOU WERE DEAD OR WISHED YOU COULD GO TO SLEEP AND NOT WAKE UP?: NO
6. HAVE YOU EVER DONE ANYTHING, STARTED TO DO ANYTHING, OR PREPARED TO DO ANYTHING TO END YOUR LIFE?: NO
2. HAVE YOU ACTUALLY HAD ANY THOUGHTS OF KILLING YOURSELF?: NO

## 2023-11-09 ASSESSMENT — ENCOUNTER SYMPTOMS
DEPRESSION: 0
OCCASIONAL FEELINGS OF UNSTEADINESS: 0
LOSS OF SENSATION IN FEET: 0

## 2023-11-09 NOTE — PROGRESS NOTES
Consulting Physician: None    Chief Complaint:     History Of Present Illness  Kailyn Lopez is a 57 y.o. female presenting with numbness.    Last year, in 2022, she had a right peroneal tendon rupture.  Following her surgery, she was non-weight bearing for 4 months.  About 10 years prior, she was walking in her flip flops.  She ended up losing her balance and her right foot twisted.      While in the cast, she developed a sensation between her right first and second toes.  She described the feeling as if there is gauze in between her toes.  More recently, she notes that the abnormal sensations start between her right 1st and 2nd toe and travels up her right foot.  It is most noticeable in her right foot when she is lying down without any shoes.  There is a slight numbness in her right foot.      She denies any double vision, slurred speech, facial droop, or weakness/numbness in her arms.       She does note some low back pain on the right side.    Past Medical History  Past Medical History:   Diagnosis Date    Adjustment insomnia 05/07/2021    Adjustment insomnia    Benign paroxysmal vertigo, unspecified ear 03/29/2021    Vertigo, benign positional    Epigastric pain 07/19/2021    Dyspepsia    Menopausal and female climacteric states 07/30/2021    Perimenopausal    Other specified postprocedural states     History of Papanicolaou smear    Personal history of other diseases of the musculoskeletal system and connective tissue 03/31/2021    History of neck pain    Personal history of other medical treatment 05/28/2020    History of screening mammography    Personal history of other medical treatment     History of mammogram    Personal history of other specified conditions 06/03/2021    History of headache    Rash and other nonspecific skin eruption 07/31/2015    Rash       Surgical History  Past Surgical History:   Procedure Laterality Date    OTHER SURGICAL HISTORY  09/06/2020    Esophagogastroduodenoscopy    OTHER  SURGICAL HISTORY  05/26/2020    Nasal septoplasty    OTHER SURGICAL HISTORY  09/06/2020    Colonoscopy       Family History  Family History   Problem Relation Name Age of Onset    Arthritis Mother      Migraines Mother      Allergies Father      Coronary artery disease Father      Allergies Brother      Migraines Brother      Arthritis Maternal Grandmother      Heart failure Maternal Grandmother      Migraines Maternal Grandmother      Arthritis Paternal Grandmother      Coronary artery disease Paternal Grandfather          Social History   reports that she quit smoking about 30 years ago. Her smoking use included cigarettes. She started smoking about 32 years ago. She has never used smokeless tobacco. She reports that she does not drink alcohol and does not use drugs.     Allergies  Ace inhibitors, Animal dander, Aspirin, Cat dander, Drospirenone-e.estradiol-lm.fa, Estrogens, Grass pollen, Nabumetone, Nsaids (non-steroidal anti-inflammatory drug), Other, Ragweed, and Tree and shrub pollen    Medications    Current Outpatient Medications:     albuterol 90 mcg/actuation inhaler, Inhale 1-2 puffs every 4 hours if needed for wheezing. EVERY 4 TO 6 HOURS AS NEEDED., Disp: 18 g, Rfl: 3    busPIRone (Buspar) 5 mg tablet, TAKE 2 TABLETS (10 MG) BY MOUTH ONCE DAILY AS NEEDED (ANXIETY)., Disp: 60 tablet, Rfl: 2    carbinoxamine maleate 4 mg tablet, Take by mouth., Disp: , Rfl:     celecoxib (CeleBREX) 200 mg capsule, Take 1 capsule (200 mg) by mouth 2 times a day., Disp: 60 capsule, Rfl: 2    cholecalciferol (Vitamin D-3) 125 MCG (5000 UT) capsule, Take 1 capsule (5,000 Units) by mouth once daily., Disp: , Rfl:     citalopram (CeleXA) 20 mg tablet, Take 1 tablet (20 mg) by mouth once daily., Disp: , Rfl:     estradiol (Estrace) 1 mg tablet, Take 1 tablet (1 mg) by mouth once daily., Disp: , Rfl:     famotidine (Pepcid) 20 mg tablet, Take 1 tablet (20 mg) by mouth once daily., Disp: , Rfl:     fluticasone propionate (Xhance)  "93 mcg/actuation aerosol breath activated, Administer 1 spray into each nostril in the morning and 1 spray before bedtime., Disp: , Rfl:     ipratropium (Atrovent) 21 mcg (0.03 %) nasal spray, Administer 2 sprays into each nostril 3 times a day as needed., Disp: , Rfl:     levothyroxine (Synthroid, Levoxyl) 100 mcg tablet, Take 1 tablet (100 mcg) by mouth once daily., Disp: , Rfl:     liothyronine (Cytomel) 5 mcg tablet, Take 0.5 tablets (2.5 mcg) by mouth once daily., Disp: , Rfl:     metFORMIN XR (metFORMIN, MOD,) 500 mg 24 hr tablet, Take 1 tablet (500 mg) by mouth once daily in the evening. Take with meals. Do not crush, chew, or split., Disp: 90 tablet, Rfl: 1    omeprazole (PriLOSEC) 40 mg DR capsule, Take 1 capsule (40 mg) by mouth once daily as needed. IN THE MORNING. 60 min BEFORE a MEAL, Disp: , Rfl:     pirbuterol acetate (MAXAIR AUTOHALER INHL), 200 mcg., Disp: , Rfl:     progesterone (Prometrium) 100 mg capsule, Take 1 capsule (100 mg) by mouth once daily at bedtime., Disp: , Rfl:     semaglutide (Ozempic) 0.25 mg or 0.5 mg(2 mg/1.5 mL) pen injector, Inject 0.5mg weekly, Disp: 3 mL, Rfl: 2    SUMAtriptan (Imitrex) 50 mg tablet, Take 1 tablet (50 mg) by mouth if needed for migraine for up to 1 dose. FOR MIGRAINE RELIEF. MAY REPEAT EVERY 2 HOURS MAXIMUM  MG PER DAY, Disp: 9 tablet, Rfl: 11    ubrogepant (Ubrelvy) 100 mg tablet tablet, Take 1 tablet (100 mg) by mouth if needed (At onset of migraine.  Okay to repeat in 2 hours if needed.)., Disp: 16 tablet, Rfl: 3      Last Recorded Vitals   Resp 18   Ht 1.676 m (5' 6\")   Wt 93.4 kg (206 lb)   LMP  (LMP Unknown)   BMI 33.25 kg/m²       Objective:  Gen: NAD  Neuro:  --HIF: A&O X 3, repetition and naming intact  --CN:  PERRLA, EOMI, VFF, no visible facial asymmetry, facial sensation intact, no tongue or palatal deviation, SCM intact  --Motor: Moves all 4 extremities equally; no focal deficits  --Sensory:reduced sensation in the right big toe and " "medial aspect of her right foot  --Reflex: 2+ symmetric, toes down  --Cerebellum: FTN and HTS intact  --Gait: Normal, narrow based.  Toe and Heal Walking Intact.  Tandem Intact    Relevant Results  Lab Results   Component Value Date    WBC 8.9 09/14/2023    HGB 13.5 09/14/2023    HCT 41.9 09/14/2023    MCV 88 09/14/2023     09/14/2023       Lab Results   Component Value Date    GLUCOSE 107 (H) 09/20/2023    CALCIUM 10.1 09/20/2023     09/20/2023    K 4.9 09/20/2023    CO2 29 09/20/2023     09/20/2023    BUN 14 09/20/2023    CREATININE 0.81 09/20/2023       Lab Results   Component Value Date    HGBA1C 5.6 11/08/2023       Lab Results   Component Value Date    CHOL 181 01/18/2023     Lab Results   Component Value Date    HDL 61.0 01/18/2023     No results found for: \"LDLCALC\"  Lab Results   Component Value Date    TRIG 146 01/18/2023     EMG/NCV (I personally reviewed the images/tracings with the following interpretation)    Normal nerve conduction  Incidental axonal loss noted in the right FDL - unclear clinical significance       Assessment:   Right Foot Paresthesia  - the patient suffered a tendon rupture on June 2022  - following surgery, she was in various different casts  - she developed right foot paresthesias - characterized by a feeling like something is between her right first and second digit  - on exam, she does have reduced sensation to light touch on the right first digit extending to the medial aspect of her right foot  - ddx includes compressive neuropathy (affecting the distal portion of the right saphenous nerve)  - other considerations include a gutiérrez's neuroma    PLAN:  - ultrasound of her right foot  - consider symptomatic medications (I.e. Gabapentin, Lyrica)    Gonzalo Ruiz MD  [facility]  Department of Neurology          Gonzalo Ruiz MD  [facility]  Department of Neurology      A copy of this note was sent to the referring provider.    "

## 2023-11-10 ENCOUNTER — PROCEDURE VISIT (OUTPATIENT)
Dept: NEUROLOGY | Facility: HOSPITAL | Age: 58
End: 2023-11-10
Payer: COMMERCIAL

## 2023-11-10 VITALS
RESPIRATION RATE: 18 BRPM | BODY MASS INDEX: 33.11 KG/M2 | SYSTOLIC BLOOD PRESSURE: 134 MMHG | TEMPERATURE: 97.1 F | WEIGHT: 206 LBS | HEIGHT: 66 IN | DIASTOLIC BLOOD PRESSURE: 84 MMHG | HEART RATE: 74 BPM

## 2023-11-10 DIAGNOSIS — G43.711 CHRONIC MIGRAINE WITHOUT AURA, WITH INTRACTABLE MIGRAINE, SO STATED, WITH STATUS MIGRAINOSUS: ICD-10-CM

## 2023-11-10 PROCEDURE — 64615 CHEMODENERV MUSC MIGRAINE: CPT | Performed by: PSYCHIATRY & NEUROLOGY

## 2023-11-10 PROCEDURE — 2500000004 HC RX 250 GENERAL PHARMACY W/ HCPCS (ALT 636 FOR OP/ED): Mod: JZ,JG | Performed by: PSYCHIATRY & NEUROLOGY

## 2023-11-10 RX ORDER — METHYLPREDNISOLONE 4 MG/1
TABLET ORAL
Qty: 21 TABLET | Refills: 0 | Status: SHIPPED | OUTPATIENT
Start: 2023-11-10 | End: 2023-11-17

## 2023-11-10 RX ADMIN — ONABOTULINUMTOXINA 200 UNITS: 100 INJECTION, POWDER, LYOPHILIZED, FOR SOLUTION INTRADERMAL; INTRAMUSCULAR at 18:35

## 2023-11-10 ASSESSMENT — PAIN SCALES - GENERAL: PAINLEVEL: 0-NO PAIN

## 2023-11-15 ENCOUNTER — APPOINTMENT (OUTPATIENT)
Dept: PHYSICAL MEDICINE AND REHAB | Facility: CLINIC | Age: 58
End: 2023-11-15
Payer: COMMERCIAL

## 2023-11-17 PROBLEM — R06.89 DIFFICULTY BREATHING: Status: ACTIVE | Noted: 2023-11-17

## 2023-11-17 PROBLEM — J34.3 HYPERTROPHY OF NASAL TURBINATES: Status: ACTIVE | Noted: 2023-11-17

## 2023-11-17 PROBLEM — J34.829 NASAL VALVE COLLAPSE: Status: ACTIVE | Noted: 2023-11-17

## 2023-11-17 PROBLEM — M95.0 NASAL VALVE COLLAPSE: Status: ACTIVE | Noted: 2023-11-17

## 2023-11-17 NOTE — PROGRESS NOTES
Reason for consult: Nasal obstruction    Referring provider: Dr. Fleming, ENT    Chief Complaint: Obstructed breathing    Constant, present year round, does not fluctuate. It affects the patients ability to sleep, exercise, and is troubling during the day.  Symptoms began: ***    Nasal steroid or spray: ***    Site of obstruction: ***    Previous Otolaryngology Provider: ***  Previous documentation for this patient was extensively reviewed including specific reasons for evaluation. Complex nature of complaint and medical issues prompting evaluation for surgical consideration by facial plastic & reconstructive surgeon.    Previous surgery: ***    Previous CT/MRI imaging of the nasal cavity and sinuses:  I personally reviewed the *** from *** and this is my impression:    Trauma history: ***    Sleep apnea or snoring history: ***    Allergic rhinitis, sinusitis history: ***    Smoking: ***    Aesthetic concern: ***    Past Medical History  She has a past medical history of Adjustment insomnia (05/07/2021), Benign paroxysmal vertigo, unspecified ear (03/29/2021), Epigastric pain (07/19/2021), Menopausal and female climacteric states (07/30/2021), Other specified postprocedural states, Personal history of other diseases of the musculoskeletal system and connective tissue (03/31/2021), Personal history of other medical treatment (05/28/2020), Personal history of other medical treatment, Personal history of other specified conditions (06/03/2021), and Rash and other nonspecific skin eruption (07/31/2015).    Surgical History  She has a past surgical history that includes Other surgical history (09/06/2020); Other surgical history (05/26/2020); and Other surgical history (09/06/2020).     Social History  She reports that she quit smoking about 30 years ago. Her smoking use included cigarettes. She started smoking about 32 years ago. She has never used smokeless tobacco. She reports that she does not drink alcohol and  does not use drugs.    Family History  Family History   Problem Relation Name Age of Onset    Arthritis Mother      Migraines Mother      Allergies Father      Coronary artery disease Father      Allergies Brother      Migraines Brother      Arthritis Maternal Grandmother      Heart failure Maternal Grandmother      Migraines Maternal Grandmother      Arthritis Paternal Grandmother      Coronary artery disease Paternal Grandfather          Allergies  Ace inhibitors, Animal dander, Aspirin, Cat dander, Drospirenone-e.estradiol-lm.fa, Estrogens, Grass pollen, Nabumetone, Nsaids (non-steroidal anti-inflammatory drug), Other, Ragweed, and Tree and shrub pollen    Physical exam    General: Well-developed and well-nourished in appearance.  Skin: No rashes or concerning lesions on the visible portions of the skin.  Eyes: Extraocular movements intact. Visual fields grossly normal.  Ears: Pinna are normal in shape and position. External canals are patent.  Oral Cavity/Oropharynx: Dentition is intact. Mucous membranes moist. No masses or lesions.  Respiratory: No respiratory distress. Quiet breathing without stertor or stridor.  Cardiovascular: Regular rate and rhythm. Warm extremities with equal pulses.  Psych: Normal mood and affect. Judgement and insight appropriate.  Neuro: Alert and oriented. CN II-XII grossly intact. No focal deficits.  Musculoskeletal: Gait intact. Moves all extremities well without apparent deformities.    A comprehensive facial exam was performed with the following highlights:    Nasal skin type: ***    External exam:  Tip: ***  Tip rotation: ***  Projection: ***  Dorsum: ***  Base width: ***  Asymmetries: ***  Alar columellar relationship: ***    Internal exam:   Septum: ***  Inferior turbinates: ***  Nasal valve angle: ***    Intranasal examination performed with limited view on anterior rhinoscopy.    Procedures:    Procedure: Rigid diagnostic nasal endoscopy  Surgeon: Pranay Perez  "MD  Anesthesia: None  Timeout: Performed  Findings: A 0-degree rigid endoscope was passed through the patient's bilateral naris. The first pass was along the floor of the nose to the nasopharynx. The second pass was to the area of the middle meatus. The third pass was to the sphenoethmoidal recess.    Septum: Deviation is S shaped with bilateral obstruction approximately 90% without perforations nor synechia.  Internal Nasal Valve: Angle is reduced, narrowing the nasal airway bilaterally.    Right nasal cavity:  Inferior turbinate: 2+  Inferior meatus: Clear, no discharge, no polyps/masses/lesions  Middle meatus: Clear, no discharge, no polyps/masses/lesions    Left nasal cavity:  Inferior turbinate: 2+  Inferior meatus: Clear, no discharge, no polyps/masses/lesions  Middle meatus: Clear, no discharge, no polyps/masses/lesions  Nasopharynx: Clear, no discharge, no masses/lesions    Modified [facility] Maneuver: Performed with a cotton tipped applicator, markedly improves breathing bilaterally.    {nasal surgery options:08943::\"Assessment - This patient has a significant mechanical nasal obstruction. The cause is multifactorial with septal deviation with severe internal nasal valve narrowing, turbinate hypertrophy, and static internal nasal valve collapse. There is some dynamic nasal valve collapse as well. Correction of this nasal obstruction will require a septoplasty, repair of nasal valve collapse with structural grafting, and a bilateral turbinate reduction. We discussed the medical necessity of this at length, as well as the risks and limitations of the procedures. All questions were answered.  Plan - Recommend reconstructive septoplasty, nasal valve repair with structural grafting, and inferior turbinate reduction with lateralization.\"}  "

## 2023-11-20 ENCOUNTER — APPOINTMENT (OUTPATIENT)
Dept: OTOLARYNGOLOGY | Facility: CLINIC | Age: 58
End: 2023-11-20
Payer: COMMERCIAL

## 2023-11-21 ENCOUNTER — APPOINTMENT (OUTPATIENT)
Dept: PHYSICAL MEDICINE AND REHAB | Facility: CLINIC | Age: 58
End: 2023-11-21
Payer: COMMERCIAL

## 2023-11-29 ENCOUNTER — APPOINTMENT (OUTPATIENT)
Dept: PHYSICAL MEDICINE AND REHAB | Facility: CLINIC | Age: 58
End: 2023-11-29
Payer: COMMERCIAL

## 2023-11-29 ENCOUNTER — TELEMEDICINE (OUTPATIENT)
Dept: OBSTETRICS AND GYNECOLOGY | Facility: CLINIC | Age: 58
End: 2023-11-29
Payer: COMMERCIAL

## 2023-11-29 DIAGNOSIS — Z00.00 HEALTHCARE MAINTENANCE: ICD-10-CM

## 2023-11-29 DIAGNOSIS — N95.1 MENOPAUSAL SYNDROME ON HORMONE REPLACEMENT THERAPY: Primary | ICD-10-CM

## 2023-11-29 DIAGNOSIS — Z79.890 MENOPAUSAL SYNDROME ON HORMONE REPLACEMENT THERAPY: Primary | ICD-10-CM

## 2023-11-29 PROCEDURE — 99212 OFFICE O/P EST SF 10 MIN: CPT | Performed by: NURSE PRACTITIONER

## 2023-11-29 NOTE — PROGRESS NOTES
Subjective   Patient ID: Kailyn Lopez is a 58 y.o. female who presents for No chief complaint on file..  HPI  worked with an immunologist for sensitivity to estrogen  works with an endocrinologist at [facility] for thyroid disease     Age at Menopause: 57  History of HT: 2021 0.5mg po estradiol and 100mg po progesterone   are you sexually active?: yes  Having sex with men, women, or both?: monogamous relationship with boyfriend  do you have any loss in desire?: yes, and bothered by it, feels it is d/t body image and poor energy  do you have any pain with intercourse?: no  are you able to have an orgasm?: yes    Estradiol increased to 1mg; progesterone stayed the same at 100mg   VMS: improved  Mood changes: improved but still lacking some of her energy level  Sleep problems: none, but doesn't wake up feeling refreshed   Had a sleep study but was told she has mild sleep apnea  Joint pain: improved, but still has some joint pain    Has not been exercising; ruptured a tendon in 2022; going to go to PT  Takes a vitamin D supplement  Uses a weight watcher's emily     Review of Systems    Objective   Physical Exam    Assessment/Plan   Diagnoses and all orders for this visit:  Menopausal syndrome on hormone replacement therapy  Healthcare maintenance  -     Vitamin D 25-Hydroxy,Total (for eval of Vitamin D levels); Future    Will stay at current MHT  Diet/exercise discussed with mindfulness and self care

## 2023-12-04 ENCOUNTER — APPOINTMENT (OUTPATIENT)
Dept: PHYSICAL MEDICINE AND REHAB | Facility: CLINIC | Age: 58
End: 2023-12-04
Payer: COMMERCIAL

## 2023-12-05 ENCOUNTER — PATIENT MESSAGE (OUTPATIENT)
Dept: PRIMARY CARE | Facility: CLINIC | Age: 58
End: 2023-12-05
Payer: COMMERCIAL

## 2023-12-05 DIAGNOSIS — U07.1 COVID-19: Primary | ICD-10-CM

## 2023-12-05 RX ORDER — NIRMATRELVIR AND RITONAVIR 300-100 MG
3 KIT ORAL 2 TIMES DAILY
Qty: 30 TABLET | Refills: 0 | Status: SHIPPED | OUTPATIENT
Start: 2023-12-05 | End: 2023-12-10

## 2023-12-11 ENCOUNTER — APPOINTMENT (OUTPATIENT)
Dept: PHYSICAL MEDICINE AND REHAB | Facility: CLINIC | Age: 58
End: 2023-12-11
Payer: COMMERCIAL

## 2023-12-12 ENCOUNTER — PATIENT MESSAGE (OUTPATIENT)
Dept: PRIMARY CARE | Facility: CLINIC | Age: 58
End: 2023-12-12
Payer: COMMERCIAL

## 2023-12-12 DIAGNOSIS — R11.2 NAUSEA AND VOMITING, UNSPECIFIED VOMITING TYPE: Primary | ICD-10-CM

## 2023-12-13 RX ORDER — ONDANSETRON 4 MG/1
4 TABLET, ORALLY DISINTEGRATING ORAL EVERY 8 HOURS PRN
Qty: 20 TABLET | Refills: 0 | Status: SHIPPED | OUTPATIENT
Start: 2023-12-13 | End: 2023-12-20

## 2023-12-15 NOTE — PROGRESS NOTES
Reason for consult: Nasal obstruction    Referring provider: Dr. Knapp, Plastic Surgery [facility]    Chief Complaint: Obstructed breathing    Constant, present year round, does not fluctuate. It affects the patients ability to sleep, exercise, and is troubling during the day.  Symptoms began: ***    Nasal steroid or spray: ***    Site of obstruction: ***    Previous Otolaryngology Provider: ***  Previous documentation for this patient was extensively reviewed including specific reasons for evaluation. Complex nature of complaint and medical issues prompting evaluation for surgical consideration by facial plastic & reconstructive surgeon.    Previous surgery: ***    Previous CT/MRI imaging of the nasal cavity and sinuses:  I personally reviewed the *** from *** and this is my impression:    Trauma history: ***    Sleep apnea or snoring history: ***    Allergic rhinitis, sinusitis history: ***    Smoking: ***    Aesthetic concern: ***    Past Medical History  She has a past medical history of Adjustment insomnia (05/07/2021), Benign paroxysmal vertigo, unspecified ear (03/29/2021), Epigastric pain (07/19/2021), Menopausal and female climacteric states (07/30/2021), Other specified postprocedural states, Personal history of other diseases of the musculoskeletal system and connective tissue (03/31/2021), Personal history of other medical treatment (05/28/2020), Personal history of other medical treatment, Personal history of other specified conditions (06/03/2021), and Rash and other nonspecific skin eruption (07/31/2015).    Surgical History  She has a past surgical history that includes Other surgical history (09/06/2020); Other surgical history (05/26/2020); and Other surgical history (09/06/2020).     Social History  She reports that she quit smoking about 30 years ago. Her smoking use included cigarettes. She started smoking about 32 years ago. She has never used smokeless tobacco. She reports that she does  not drink alcohol and does not use drugs.    Family History  Family History   Problem Relation Name Age of Onset    Arthritis Mother      Migraines Mother      Allergies Father      Coronary artery disease Father      Allergies Brother      Migraines Brother      Arthritis Maternal Grandmother      Heart failure Maternal Grandmother      Migraines Maternal Grandmother      Arthritis Paternal Grandmother      Coronary artery disease Paternal Grandfather          Allergies  Ace inhibitors, Animal dander, Aspirin, Cat dander, Drospirenone-e.estradiol-lm.fa, Estrogens, Grass pollen, Nabumetone, Nsaids (non-steroidal anti-inflammatory drug), Other, Ragweed, and Tree and shrub pollen    Physical exam    General: Well-developed and well-nourished in appearance.  Skin: No rashes or concerning lesions on the visible portions of the skin.  Eyes: Extraocular movements intact. Visual fields grossly normal.  Ears: Pinna are normal in shape and position. External canals are patent.  Oral Cavity/Oropharynx: Dentition is intact. Mucous membranes moist. No masses or lesions.  Respiratory: No respiratory distress. Quiet breathing without stertor or stridor.  Cardiovascular: Regular rate and rhythm. Warm extremities with equal pulses.  Psych: Normal mood and affect. Judgement and insight appropriate.  Neuro: Alert and oriented. CN II-XII grossly intact. No focal deficits.  Musculoskeletal: Gait intact. Moves all extremities well without apparent deformities.    A comprehensive facial exam was performed with the following highlights:    Nasal skin type: ***    External exam:  Tip: ***  Tip rotation: ***  Projection: ***  Dorsum: ***  Base width: ***  Asymmetries: ***  Alar columellar relationship: ***    Internal exam:   Septum: ***  Inferior turbinates: ***  Nasal valve angle: ***    Intranasal examination performed with limited view on anterior rhinoscopy.    Procedures:    Procedure: Rigid diagnostic nasal endoscopy  Surgeon: Pranay  "MD Ana  Anesthesia: None  Timeout: Performed  Findings: A 0-degree rigid endoscope was passed through the patient's bilateral naris. The first pass was along the floor of the nose to the nasopharynx. The second pass was to the area of the middle meatus. The third pass was to the sphenoethmoidal recess.    Septum: Deviation is S shaped with bilateral obstruction approximately 90% without perforations nor synechia.  Internal Nasal Valve: Angle is reduced, narrowing the nasal airway bilaterally.    Right nasal cavity:  Inferior turbinate: 2+  Inferior meatus: Clear, no discharge, no polyps/masses/lesions  Middle meatus: Clear, no discharge, no polyps/masses/lesions    Left nasal cavity:  Inferior turbinate: 2+  Inferior meatus: Clear, no discharge, no polyps/masses/lesions  Middle meatus: Clear, no discharge, no polyps/masses/lesions  Nasopharynx: Clear, no discharge, no masses/lesions    Modified [place] Maneuver: Performed with a cotton tipped applicator, markedly improves breathing bilaterally.    {nasal surgery options:[id number]::\"Assessment - This patient has a significant mechanical nasal obstruction. The cause is multifactorial with septal deviation with severe internal nasal valve narrowing, turbinate hypertrophy, and static internal nasal valve collapse. There is some dynamic nasal valve collapse as well. Correction of this nasal obstruction will require a septoplasty, repair of nasal valve collapse with structural grafting, and a bilateral turbinate reduction. We discussed the medical necessity of this at length, as well as the risks and limitations of the procedures. All questions were answered.  Plan - Recommend reconstructive septoplasty, nasal valve repair with structural grafting, and inferior turbinate reduction with lateralization.\"}    "

## 2023-12-18 ENCOUNTER — APPOINTMENT (OUTPATIENT)
Dept: OTOLARYNGOLOGY | Facility: CLINIC | Age: 58
End: 2023-12-18
Payer: COMMERCIAL

## 2023-12-18 ENCOUNTER — APPOINTMENT (OUTPATIENT)
Dept: PHYSICAL MEDICINE AND REHAB | Facility: CLINIC | Age: 58
End: 2023-12-18
Payer: COMMERCIAL

## 2023-12-27 ENCOUNTER — APPOINTMENT (OUTPATIENT)
Dept: PHYSICAL MEDICINE AND REHAB | Facility: CLINIC | Age: 58
End: 2023-12-27
Payer: COMMERCIAL

## 2024-01-16 ENCOUNTER — APPOINTMENT (OUTPATIENT)
Dept: NEUROLOGY | Facility: HOSPITAL | Age: 59
End: 2024-01-16
Payer: COMMERCIAL

## 2024-01-17 DIAGNOSIS — F41.9 ANXIETY: Primary | ICD-10-CM

## 2024-01-17 RX ORDER — CITALOPRAM 20 MG/1
20 TABLET, FILM COATED ORAL DAILY
Qty: 90 TABLET | Refills: 1 | Status: SHIPPED | OUTPATIENT
Start: 2024-01-17

## 2024-01-23 ENCOUNTER — DOCUMENTATION (OUTPATIENT)
Dept: PHYSICAL THERAPY | Facility: CLINIC | Age: 59
End: 2024-01-23
Payer: COMMERCIAL

## 2024-01-23 NOTE — PROGRESS NOTES
Physical Therapy    Discharge Summary    Name: Kailyn Lopez  MRN: [id number]  : 1965  Date: 24    Discharge Summary: PT    Discharge Information:   Date of discharge 24  Date of last visit 11/3/23  Date of evaluation 11/3/23  Number of attended visits 1  Referred by Alonzo  Referred for R hip, LBP, neck pain    Rehab Discharge Reason:  Lost to follow up, only attended initial eval

## 2024-01-25 NOTE — PROGRESS NOTES
Reason for consult: Nasal obstruction    Referring provider: ***    Chief Complaint: Obstructed breathing    Constant, present year round, does not fluctuate. It affects the patients ability to sleep, exercise, and is troubling during the day.  Symptoms began: ***    Nasal steroid or spray: ***    Site of obstruction: ***    Previous Otolaryngology Provider: ***  Previous documentation for this patient was extensively reviewed including specific reasons for evaluation. Complex nature of complaint and medical issues prompting evaluation for surgical consideration by facial plastic & reconstructive surgeon.    Previous surgery: [facility] 2015 Open septorhinoplasty,   grafts, weir resections     Previous CT/MRI imaging of the nasal cavity and sinuses:  I personally reviewed the *** from *** and this is my impression:    Trauma history: ***    Sleep apnea or snoring history: ***    Allergic rhinitis, sinusitis history: ***    Smoking: ***    Aesthetic concern: ***    Past Medical History  She has a past medical history of Adjustment insomnia (05/07/2021), Benign paroxysmal vertigo, unspecified ear (03/29/2021), Epigastric pain (07/19/2021), Menopausal and female climacteric states (07/30/2021), Other specified postprocedural states, Personal history of other diseases of the musculoskeletal system and connective tissue (03/31/2021), Personal history of other medical treatment (05/28/2020), Personal history of other medical treatment, Personal history of other specified conditions (06/03/2021), and Rash and other nonspecific skin eruption (07/31/2015).    Surgical History  She has a past surgical history that includes Other surgical history (09/06/2020); Other surgical history (05/26/2020); and Other surgical history (09/06/2020).     Social History  She reports that she quit smoking about 31 years ago. Her smoking use included cigarettes. She started smoking about 33 years ago. She has never used smokeless tobacco. She  reports that she does not drink alcohol and does not use drugs.    Family History  Reason for consult: Nasal obstruction     Referring provider: Dr. Knapp, Plastic Surgery [facility]     Chief Complaint: Obstructed breathing     Constant, present year round, does not fluctuate. It affects the patients ability to sleep, exercise, and is troubling during the day.  Symptoms began: ***     Nasal steroid or spray: ***     Site of obstruction: ***     Previous Otolaryngology Provider: ***  Previous documentation for this patient was extensively reviewed including specific reasons for evaluation. Complex nature of complaint and medical issues prompting evaluation for surgical consideration by facial plastic & reconstructive surgeon.     Previous surgery: 2015 open septorhinoplasty [facility]     Previous CT/MRI imaging of the nasal cavity and sinuses:  I personally reviewed the *** from *** and this is my impression:     Trauma history: ***     Sleep apnea or snoring history: ***     Allergic rhinitis, sinusitis history: ***     Smoking: ***     Aesthetic concern: ***     Past Medical History  She has a past medical history of Adjustment insomnia (05/07/2021), Benign paroxysmal vertigo, unspecified ear (03/29/2021), Epigastric pain (07/19/2021), Menopausal and female climacteric states (07/30/2021), Other specified postprocedural states, Personal history of other diseases of the musculoskeletal system and connective tissue (03/31/2021), Personal history of other medical treatment (05/28/2020), Personal history of other medical treatment, Personal history of other specified conditions (06/03/2021), and Rash and other nonspecific skin eruption (07/31/2015).     Surgical History  She has a past surgical history that includes Other surgical history (09/06/2020); Other surgical history (05/26/2020); and Other surgical history (09/06/2020).     Social History  She reports that she quit smoking about 30 years ago. Her smoking use  included cigarettes. She started smoking about 32 years ago. She has never used smokeless tobacco. She reports that she does not drink alcohol and does not use drugs.     Family History  Family History          Family History   Problem Relation Name Age of Onset    Arthritis Mother        Migraines Mother        Allergies Father        Coronary artery disease Father        Allergies Brother        Migraines Brother        Arthritis Maternal Grandmother        Heart failure Maternal Grandmother        Migraines Maternal Grandmother        Arthritis Paternal Grandmother        Coronary artery disease Paternal Grandfather                Allergies  Ace inhibitors, Animal dander, Aspirin, Cat dander, Drospirenone-e.estradiol-lm.fa, Estrogens, Grass pollen, Nabumetone, Nsaids (non-steroidal anti-inflammatory drug), Other, Ragweed, and Tree and shrub pollen     Physical exam     General: Well-developed and well-nourished in appearance.  Skin: No rashes or concerning lesions on the visible portions of the skin.  Eyes: Extraocular movements intact. Visual fields grossly normal.  Ears: Pinna are normal in shape and position. External canals are patent.  Oral Cavity/Oropharynx: Dentition is intact. Mucous membranes moist. No masses or lesions.  Respiratory: No respiratory distress. Quiet breathing without stertor or stridor.  Cardiovascular: Regular rate and rhythm. Warm extremities with equal pulses.  Psych: Normal mood and affect. Judgement and insight appropriate.  Neuro: Alert and oriented. CN II-XII grossly intact. No focal deficits.  Musculoskeletal: Gait intact. Moves all extremities well without apparent deformities.     A comprehensive facial exam was performed with the following highlights:     Nasal skin type: ***     External exam:  Tip: ***  Tip rotation: ***  Projection: ***  Dorsum: ***  Base width: ***  Asymmetries: ***  Alar columellar relationship: ***     Internal exam:   Septum: ***  Inferior turbinates:  "***  Nasal valve angle: ***     Intranasal examination performed with limited view on anterior rhinoscopy.     Procedures:     Procedure: Rigid diagnostic nasal endoscopy  Surgeon: Pranay Perez MD  Anesthesia: None  Timeout: Performed  Findings: A 0-degree rigid endoscope was passed through the patient's bilateral naris. The first pass was along the floor of the nose to the nasopharynx. The second pass was to the area of the middle meatus. The third pass was to the sphenoethmoidal recess.     Septum: Deviation is S shaped with bilateral obstruction approximately 90% without perforations nor synechia.  Internal Nasal Valve: Angle is reduced, narrowing the nasal airway bilaterally.     Right nasal cavity:  Inferior turbinate: 2+  Inferior meatus: Clear, no discharge, no polyps/masses/lesions  Middle meatus: Clear, no discharge, no polyps/masses/lesions     Left nasal cavity:  Inferior turbinate: 2+  Inferior meatus: Clear, no discharge, no polyps/masses/lesions  Middle meatus: Clear, no discharge, no polyps/masses/lesions  Nasopharynx: Clear, no discharge, no masses/lesions     Modified [place] Maneuver: Performed with a cotton tipped applicator, markedly improves breathing bilaterally.     {nasal surgery options:88452::\"Assessment - This patient has a significant mechanical nasal obstruction. The cause is multifactorial with septal deviation with severe internal nasal valve narrowing, turbinate hypertrophy, and static internal nasal valve collapse. There is some dynamic nasal valve collapse as well. Correction of this nasal obstruction will require a septoplasty, repair of nasal valve collapse with structural grafting, and a bilateral turbinate reduction. We discussed the medical necessity of this at length, as well as the risks and limitations of the procedures. All questions were answered.  Plan - Recommend reconstructive septoplasty, nasal valve repair with structural grafting, and inferior turbinate reduction " "with lateralization.\"}       Allergies  Ace inhibitors, Animal dander, Aspirin, Cat dander, Drospirenone-e.estradiol-lm.fa, Estrogens, Grass pollen, Nabumetone, Nsaids (non-steroidal anti-inflammatory drug), Other, Ragweed, and Tree and shrub pollen    Physical exam    General: Well-developed and well-nourished in appearance.  Skin: No rashes or concerning lesions on the visible portions of the skin.  Eyes: Extraocular movements intact. Visual fields grossly normal.  Ears: Pinna are normal in shape and position. External canals are patent.  Oral Cavity/Oropharynx: Dentition is intact. Mucous membranes moist. No masses or lesions.  Respiratory: No respiratory distress. Quiet breathing without stertor or stridor.  Cardiovascular: Regular rate and rhythm. Warm extremities with equal pulses.  Psych: Normal mood and affect. Judgement and insight appropriate.  Neuro: Alert and oriented. CN II-XII grossly intact. No focal deficits.  Musculoskeletal: Gait intact. Moves all extremities well without apparent deformities.    A comprehensive facial exam was performed with the following highlights:    Nasal skin type: ***    External exam:  Tip: ***  Tip rotation: ***  Projection: ***  Dorsum: ***  Base width: ***  Asymmetries: ***  Alar columellar relationship: ***    Internal exam:   Septum: ***  Inferior turbinates: ***  Nasal valve angle: ***    Intranasal examination performed with limited view on anterior rhinoscopy.    Procedures:    Procedure: Rigid diagnostic nasal endoscopy  Surgeon: Pranay Perez MD  Anesthesia: None  Timeout: Performed  Findings: A 0-degree rigid endoscope was passed through the patient's bilateral naris. The first pass was along the floor of the nose to the nasopharynx. The second pass was to the area of the middle meatus. The third pass was to the sphenoethmoidal recess.    Septum: Deviation is S shaped with bilateral obstruction approximately 90% without perforations nor synechia.  Internal Nasal " "Valve: Angle is reduced, narrowing the nasal airway bilaterally.    Right nasal cavity:  Inferior turbinate: 2+  Inferior meatus: Clear, no discharge, no polyps/masses/lesions  Middle meatus: Clear, no discharge, no polyps/masses/lesions    Left nasal cavity:  Inferior turbinate: 2+  Inferior meatus: Clear, no discharge, no polyps/masses/lesions  Middle meatus: Clear, no discharge, no polyps/masses/lesions  Nasopharynx: Clear, no discharge, no masses/lesions    Modified [place] Maneuver: Performed with a cotton tipped applicator, markedly improves breathing bilaterally.    {nasal surgery options:[id number]::\"Assessment - This patient has a significant mechanical nasal obstruction. The cause is multifactorial with septal deviation with severe internal nasal valve narrowing, turbinate hypertrophy, and static internal nasal valve collapse. There is some dynamic nasal valve collapse as well. Correction of this nasal obstruction will require a septoplasty, repair of nasal valve collapse with structural grafting, and a bilateral turbinate reduction. We discussed the medical necessity of this at length, as well as the risks and limitations of the procedures. All questions were answered.  Plan - Recommend reconstructive septoplasty, nasal valve repair with structural grafting, and inferior turbinate reduction with lateralization.\"}    "

## 2024-01-29 ENCOUNTER — APPOINTMENT (OUTPATIENT)
Dept: OTOLARYNGOLOGY | Facility: CLINIC | Age: 59
End: 2024-01-29
Payer: COMMERCIAL

## 2024-02-05 ENCOUNTER — APPOINTMENT (OUTPATIENT)
Dept: OTOLARYNGOLOGY | Facility: CLINIC | Age: 59
End: 2024-02-05
Payer: COMMERCIAL

## 2024-02-07 ENCOUNTER — PROCEDURE VISIT (OUTPATIENT)
Dept: NEUROLOGY | Facility: CLINIC | Age: 59
End: 2024-02-07
Payer: COMMERCIAL

## 2024-02-07 ENCOUNTER — APPOINTMENT (OUTPATIENT)
Dept: PHYSICAL MEDICINE AND REHAB | Facility: CLINIC | Age: 59
End: 2024-02-07
Payer: COMMERCIAL

## 2024-02-07 VITALS
TEMPERATURE: 97.5 F | RESPIRATION RATE: 16 BRPM | BODY MASS INDEX: 32.93 KG/M2 | DIASTOLIC BLOOD PRESSURE: 82 MMHG | SYSTOLIC BLOOD PRESSURE: 110 MMHG | WEIGHT: 204 LBS

## 2024-02-07 DIAGNOSIS — G43.711 CHRONIC MIGRAINE WITHOUT AURA, WITH INTRACTABLE MIGRAINE, SO STATED, WITH STATUS MIGRAINOSUS: ICD-10-CM

## 2024-02-07 PROCEDURE — 64615 CHEMODENERV MUSC MIGRAINE: CPT | Performed by: PSYCHIATRY & NEUROLOGY

## 2024-02-07 RX ORDER — METHYLPREDNISOLONE 4 MG/1
TABLET ORAL
Qty: 21 TABLET | Refills: 0 | Status: SHIPPED | OUTPATIENT
Start: 2024-02-07 | End: 2024-02-14

## 2024-02-07 NOTE — PROGRESS NOTES
Patient ID: Kailyn Lopez is a 58 y.o. female.    Head/Face/Jaw Botulinum Injection    Date/Time: 2/7/2024 2:56 PM    Performed by: Evelyn Robles MD  Authorized by: Evelyn Robles MD      Consent:      Consent obtained:  Verbal     Consent given by:  Patient    Procedure details:      EMG used?  No     Electrical stimulation used?  No     Diluted by:  Preservative free saline     Toxin (Brand):  OnaBoNT-A (Botox)     Total units available:  200       Ad hoc region injected:  Head see diagram with 200 units     Total units injected:  200     Total units wasted:  0    Post-procedure details:      Patient tolerance of procedure:  Tolerated well, no immediate complications    Comments: Please do not rub areas for 24 hours.  No pressure above eyebrows for 24 hours.  Watch out for helmets, headlamps, headbands, goggles, or massage for 24 hours.  If there is discomfort, ice for the first 24 hour,s heat after that.  Headaches may worsen, or you may experience neck stiffness.  If this occurs use your usual headache medication or a mild anti inflammatory such as advil or aleve.  Please call if you have difficulty swallowing.

## 2024-02-12 ENCOUNTER — APPOINTMENT (OUTPATIENT)
Dept: PHYSICAL MEDICINE AND REHAB | Facility: CLINIC | Age: 59
End: 2024-02-12
Payer: COMMERCIAL

## 2024-02-13 ENCOUNTER — OFFICE VISIT (OUTPATIENT)
Dept: PRIMARY CARE | Facility: CLINIC | Age: 59
End: 2024-02-13
Payer: COMMERCIAL

## 2024-02-13 VITALS
HEART RATE: 83 BPM | DIASTOLIC BLOOD PRESSURE: 80 MMHG | OXYGEN SATURATION: 100 % | SYSTOLIC BLOOD PRESSURE: 122 MMHG | WEIGHT: 201 LBS | HEIGHT: 66 IN | BODY MASS INDEX: 32.3 KG/M2

## 2024-02-13 DIAGNOSIS — J45.20 INTERMITTENT ASTHMA WITHOUT COMPLICATION, UNSPECIFIED ASTHMA SEVERITY (HHS-HCC): ICD-10-CM

## 2024-02-13 DIAGNOSIS — N95.1 MENOPAUSE SYNDROME: ICD-10-CM

## 2024-02-13 DIAGNOSIS — F41.9 ANXIETY: Primary | ICD-10-CM

## 2024-02-13 DIAGNOSIS — R73.03 PREDIABETES: ICD-10-CM

## 2024-02-13 DIAGNOSIS — K22.70 BARRETT'S ESOPHAGUS WITHOUT DYSPLASIA: ICD-10-CM

## 2024-02-13 DIAGNOSIS — E03.9 HYPOTHYROIDISM, UNSPECIFIED TYPE: ICD-10-CM

## 2024-02-13 DIAGNOSIS — K21.00 GASTROESOPHAGEAL REFLUX DISEASE WITH ESOPHAGITIS WITHOUT HEMORRHAGE: ICD-10-CM

## 2024-02-13 PROBLEM — Z86.0101 HISTORY OF ADENOMATOUS POLYP OF COLON: Status: ACTIVE | Noted: 2023-01-06

## 2024-02-13 PROBLEM — Q89.9: Status: RESOLVED | Noted: 2023-03-08 | Resolved: 2024-02-13

## 2024-02-13 PROBLEM — M19.071 ARTHRITIS OF RIGHT SUBTALAR JOINT: Status: ACTIVE | Noted: 2020-07-01

## 2024-02-13 PROBLEM — R23.2 HOT FLASHES: Status: ACTIVE | Noted: 2024-02-13

## 2024-02-13 PROBLEM — R14.0 BLOATING: Status: ACTIVE | Noted: 2023-01-06

## 2024-02-13 PROBLEM — E06.3 HYPOTHYROIDISM DUE TO HASHIMOTO'S THYROIDITIS: Status: ACTIVE | Noted: 2021-05-13

## 2024-02-13 PROBLEM — E03.8 HYPOTHYROIDISM DUE TO HASHIMOTO'S THYROIDITIS: Status: ACTIVE | Noted: 2021-05-13

## 2024-02-13 PROBLEM — K58.1 IRRITABLE BOWEL SYNDROME WITH PREDOMINANT CONSTIPATION: Status: ACTIVE | Noted: 2024-02-13

## 2024-02-13 PROBLEM — Z86.010 HISTORY OF ADENOMATOUS POLYP OF COLON: Status: ACTIVE | Noted: 2023-01-06

## 2024-02-13 PROBLEM — Z86.39 HISTORY OF NUTRITIONAL DISORDER: Status: ACTIVE | Noted: 2024-02-13

## 2024-02-13 PROBLEM — R76.8 ANA POSITIVE: Status: ACTIVE | Noted: 2024-02-13

## 2024-02-13 LAB — HBA1C MFR BLD: 5.8 % (ref 4.2–6.5)

## 2024-02-13 PROCEDURE — 1036F TOBACCO NON-USER: CPT | Performed by: STUDENT IN AN ORGANIZED HEALTH CARE EDUCATION/TRAINING PROGRAM

## 2024-02-13 PROCEDURE — 3008F BODY MASS INDEX DOCD: CPT | Performed by: STUDENT IN AN ORGANIZED HEALTH CARE EDUCATION/TRAINING PROGRAM

## 2024-02-13 PROCEDURE — 83036 HEMOGLOBIN GLYCOSYLATED A1C: CPT | Mod: CLIA WAIVED TEST | Performed by: STUDENT IN AN ORGANIZED HEALTH CARE EDUCATION/TRAINING PROGRAM

## 2024-02-13 PROCEDURE — 99214 OFFICE O/P EST MOD 30 MIN: CPT | Performed by: STUDENT IN AN ORGANIZED HEALTH CARE EDUCATION/TRAINING PROGRAM

## 2024-02-13 RX ORDER — CITALOPRAM 10 MG/1
10 TABLET ORAL DAILY
Qty: 30 TABLET | Refills: 1 | Status: SHIPPED | OUTPATIENT
Start: 2024-02-13 | End: 2024-02-27 | Stop reason: SDUPTHER

## 2024-02-13 ASSESSMENT — ENCOUNTER SYMPTOMS
CARDIOVASCULAR NEGATIVE: 1
BACK PAIN: 1
FATIGUE: 1
NEUROLOGICAL NEGATIVE: 1
MYALGIAS: 1
ABDOMINAL PAIN: 1
ARTHRALGIAS: 1
RESPIRATORY NEGATIVE: 1
NERVOUS/ANXIOUS: 1
SLEEP DISTURBANCE: 1

## 2024-02-13 NOTE — PROGRESS NOTES
Subjective   Patient ID: Kailyn Lopez is a 58 y.o. female who presents for Follow-up (A1c check /Had cortisone shot in back yesterday and cheeks are very flushed from it ).  Had steroid injection in her back yesterday. Seeing pain management for this. They are trying to see if this will help her right leg pain and foot numbness.     Her and her partner have parted ways after 6 years. Having a lot of stress. Interested in adjusting her meds. Also open to talking with a therapist. Trying to do more things for her including starting reading for pleasure again.     Having procedure on her left arm for the tendon. She is having this done next month. Looking forward to this helping the pain.     Does not sleep well. Sleeps with the tv on, needs background noise. Open to trying white noise machine.     Watching her diet, slowly losing weight.             Review of Systems   Constitutional:  Positive for fatigue.   HENT: Negative.     Respiratory: Negative.     Cardiovascular: Negative.    Gastrointestinal:  Positive for abdominal pain.   Musculoskeletal:  Positive for arthralgias, back pain and myalgias.   Neurological: Negative.    Psychiatric/Behavioral:  Positive for sleep disturbance. The patient is nervous/anxious.    All other systems reviewed and are negative.      Objective   Physical Exam  Vitals reviewed.   Constitutional:       General: She is not in acute distress.     Appearance: Normal appearance.   HENT:      Head: Normocephalic.   Eyes:      Pupils: Pupils are equal, round, and reactive to light.   Cardiovascular:      Rate and Rhythm: Normal rate and regular rhythm.   Pulmonary:      Effort: Pulmonary effort is normal. No respiratory distress.   Musculoskeletal:         General: Normal range of motion.   Skin:     General: Skin is warm and dry.   Neurological:      Mental Status: She is alert. Mental status is at baseline.   Psychiatric:         Mood and Affect: Mood normal.         Behavior: Behavior  normal.             Current Outpatient Medications:     albuterol 90 mcg/actuation inhaler, Inhale 1-2 puffs every 4 hours if needed for wheezing. EVERY 4 TO 6 HOURS AS NEEDED., Disp: 18 g, Rfl: 3    busPIRone (Buspar) 5 mg tablet, TAKE 2 TABLETS (10 MG) BY MOUTH ONCE DAILY AS NEEDED (ANXIETY)., Disp: 60 tablet, Rfl: 2    carbinoxamine maleate 4 mg tablet, Take by mouth., Disp: , Rfl:     celecoxib (CeleBREX) 200 mg capsule, Take 1 capsule (200 mg) by mouth 2 times a day., Disp: 60 capsule, Rfl: 2    cholecalciferol (Vitamin D-3) 125 MCG (5000 UT) capsule, Take 1 capsule (125 mcg) by mouth once daily., Disp: , Rfl:     citalopram (CeleXA) 20 mg tablet, Take 1 tablet (20 mg) by mouth once daily., Disp: 90 tablet, Rfl: 1    estradiol (Estrace) 1 mg tablet, Take 1 tablet (1 mg) by mouth once daily., Disp: , Rfl:     famotidine (Pepcid) 20 mg tablet, Take 1 tablet (20 mg) by mouth once daily., Disp: , Rfl:     fluticasone propionate (Xhance) 93 mcg/actuation aerosol breath activated, Administer 1 puff into each nostril 2 times a day., Disp: , Rfl:     ipratropium (Atrovent) 21 mcg (0.03 %) nasal spray, Administer 2 sprays into each nostril 3 times a day as needed., Disp: , Rfl:     levothyroxine (Synthroid, Levoxyl) 100 mcg tablet, Take 1 tablet (100 mcg) by mouth once daily., Disp: , Rfl:     liothyronine (Cytomel) 5 mcg tablet, Take 0.5 tablets (2.5 mcg) by mouth once daily., Disp: , Rfl:     metFORMIN XR (metFORMIN, MOD,) 500 mg 24 hr tablet, Take 1 tablet (500 mg) by mouth once daily in the evening. Take with meals. Do not crush, chew, or split., Disp: 90 tablet, Rfl: 1    omeprazole (PriLOSEC) 40 mg DR capsule, Take 1 capsule (40 mg) by mouth once daily as needed. IN THE MORNING. 60 min BEFORE a MEAL, Disp: , Rfl:     pirbuterol acetate (MAXAIR AUTOHALER INHL), 200 mcg., Disp: , Rfl:     progesterone (Prometrium) 100 mg capsule, Take 1 capsule (100 mg) by mouth once daily at bedtime., Disp: , Rfl:     semaglutide  (Ozempic) 0.25 mg or 0.5 mg(2 mg/1.5 mL) pen injector, Inject 0.5mg weekly (Patient taking differently: Inject 0.25 mg under the skin 1 (one) time per week. Inject 0.5mg weekly), Disp: 3 mL, Rfl: 2    citalopram (CeleXA) 10 mg tablet, Take 1 tablet (10 mg) by mouth once daily., Disp: 30 tablet, Rfl: 1    methylPREDNISolone (Medrol Dospak) 4 mg tablets, Follow schedule on package instructions (Patient not taking: Reported on 2/13/2024), Disp: 21 tablet, Rfl: 0    SUMAtriptan (Imitrex) 50 mg tablet, Take 1 tablet (50 mg) by mouth if needed for migraine for up to 1 dose. FOR MIGRAINE RELIEF. MAY REPEAT EVERY 2 HOURS MAXIMUM  MG PER DAY, Disp: 9 tablet, Rfl: 11    ubrogepant (Ubrelvy) 100 mg tablet tablet, Take 1 tablet (100 mg) by mouth if needed (At onset of migraine.  Okay to repeat in 2 hours if needed.)., Disp: 16 tablet, Rfl: 3      Assessment/Plan   Diagnoses and all orders for this visit:  Anxiety  Comments:  increase celexa to 30mg daily  take buspar daily  recommended counseling  Orders:  -     citalopram (CeleXA) 10 mg tablet; Take 1 tablet (10 mg) by mouth once daily.  Intermittent asthma without complication, unspecified asthma severity  Prediabetes  Comments:  a1c slight increase to 5.8%  likely stress contributing  Orders:  -     POCT Glycosylated Hemoglobin (HGB A1C) docked device  -     POCT GLYCOSYLATED HEMOGLOBIN (HGB A1C)  Gastroesophageal reflux disease with esophagitis without hemorrhage  Hui's esophagus without dysplasia  Comments:  sees GI regularly  Hypothyroidism, unspecified type  Menopause syndrome  Comments:  doing better on estrace  BMI 32.0-32.9,adult  Comments:  congratulated her efforts so far       Follow up in 3 months    Oneyda Rosado DO 02/13/24 5:48 PM

## 2024-02-16 DIAGNOSIS — E66.09 CLASS 1 OBESITY DUE TO EXCESS CALORIES WITH BODY MASS INDEX (BMI) OF 34.0 TO 34.9 IN ADULT, UNSPECIFIED WHETHER SERIOUS COMORBIDITY PRESENT: ICD-10-CM

## 2024-02-16 RX ORDER — METFORMIN HYDROCHLORIDE 500 MG/1
500 TABLET, EXTENDED RELEASE ORAL
Qty: 90 TABLET | Refills: 1 | Status: SHIPPED | OUTPATIENT
Start: 2024-02-16

## 2024-02-19 NOTE — PROGRESS NOTES
Reason for consult: Nasal obstruction     Referring provider: Dr. Knapp, [facility]     Chief Complaint: Obstructed breathing     Constant, present year round, does not fluctuate. It affects the patients ability to sleep, exercise, and is troubling during the day.  Symptoms began:      Nasal steroid or spray: Xhance stopped due to bleeding     Site of obstruction: R>L     Previous Otolaryngology Provider: Dr. Fleming  Previous documentation for this patient was extensively reviewed including specific reasons for evaluation. Complex nature of complaint and medical issues prompting evaluation for surgical consideration by facial plastic & reconstructive surgeon.     Previous surgery: 2015 open septorhinoplasty [facility] Dr. Langford with weir resections and  grafts     Previous CT/MRI imaging of the nasal cavity and sinuses:  I personally reviewed the PACS CT HEAD from 9/2023 and this is my impression: Right septal deviation     Trauma history: No     Sleep apnea or snoring history: Mild RAMESH and snores     Allergic rhinitis, sinusitis history: Allergies     Smoking: Former Smoker     Aesthetic concern: No     Past Medical History  She has a past medical history of Adjustment insomnia (05/07/2021), Benign paroxysmal vertigo, unspecified ear (03/29/2021), Epigastric pain (07/19/2021), Menopausal and female climacteric states (07/30/2021), Other specified postprocedural states, Personal history of other diseases of the musculoskeletal system and connective tissue (03/31/2021), Personal history of other medical treatment (05/28/2020), Personal history of other medical treatment, Personal history of other specified conditions (06/03/2021), and Rash and other nonspecific skin eruption (07/31/2015).     Surgical History  She has a past surgical history that includes Other surgical history (09/06/2020); Other surgical history (05/26/2020); and Other surgical history (09/06/2020).     Social  History  She reports that she quit smoking about 30 years ago. Her smoking use included cigarettes. She started smoking about 32 years ago. She has never used smokeless tobacco. She reports that she does not drink alcohol and does not use drugs.     Family History  Family History          Family History   Problem Relation Name Age of Onset    Arthritis Mother        Migraines Mother        Allergies Father        Coronary artery disease Father        Allergies Brother        Migraines Brother        Arthritis Maternal Grandmother        Heart failure Maternal Grandmother        Migraines Maternal Grandmother        Arthritis Paternal Grandmother        Coronary artery disease Paternal Grandfather                Allergies  Ace inhibitors, Animal dander, Aspirin, Cat dander, Drospirenone-e.estradiol-lm.fa, Estrogens, Grass pollen, Nabumetone, Nsaids (non-steroidal anti-inflammatory drug), Other, Ragweed, and Tree and shrub pollen     Physical exam     General: Well-developed and well-nourished in appearance.  Skin: No rashes or concerning lesions on the visible portions of the skin.  Eyes: Extraocular movements intact. Visual fields grossly normal.  Ears: Pinna are normal in shape and position. External canals are patent.  Oral Cavity/Oropharynx: Dentition is intact. Mucous membranes moist. No masses or lesions.  Respiratory: No respiratory distress. Quiet breathing without stertor or stridor.  Cardiovascular: Regular rate and rhythm. Warm extremities with equal pulses.  Psych: Normal mood and affect. Judgement and insight appropriate.  Neuro: Alert and oriented. CN II-XII grossly intact. No focal deficits.  Musculoskeletal: Gait intact. Moves all extremities well without apparent deformities.     A comprehensive facial exam was performed with the following highlights:     Nasal skin type: Moderate    External exam:  Reveals tip projection with polybeak  Bilateral lateral crural insufficiency with significant scar  tissue from previous rhinoplasty     Internal exam:   Septum: C shaped right deviation with left caudal deflection  Inferior turbinates: hypertrophic bilaterally  Nasal valve angle: Decreased bilaterally; R>L     Intranasal examination performed with limited view on anterior rhinoscopy.     Procedures:     Procedure: Rigid diagnostic nasal endoscopy  Surgeon: Pranay Perez MD  Anesthesia: None  Timeout: Performed  Findings: A 0-degree rigid endoscope was passed through the patient's bilateral naris. The first pass was along the floor of the nose to the nasopharynx. The second pass was to the area of the middle meatus. The third pass was to the sphenoethmoidal recess.     Septum: Deviation is S shaped with left > right obstruction approximately 90% without perforations nor synechia.  Internal Nasal Valve: Angle is reduced, narrowing the nasal airway bilaterally.     Right nasal cavity:  Inferior turbinate: 2+  Inferior meatus: Clear, no discharge, no polyps/masses/lesions  Middle meatus: Clear, no discharge, no polyps/masses/lesions     Left nasal cavity:  Inferior turbinate: 2+  Inferior meatus: Clear, no discharge, no polyps/masses/lesions  Middle meatus: Clear, no discharge, no polyps/masses/lesions  Nasopharynx: Clear, no discharge, no masses/lesions     Modified Nash Maneuver: Performed with a cotton tipped applicator, markedly improves breathing bilaterally.       Assessment - This patient has a significant mechanical nasal obstruction. The cause is multifactorial, with an obvious nasal deformity, lateral crura insufficiency as a result of previous septorhinoplasty, septal deviation with severe internal nasal valve narrowing, turbinate hypertrophy, and static internal nasal valve collapse. There is some dynamic nasal valve collapse as well. Correction of this nasal obstruction will require a revision reconstructive septorhinoplasty, repair of nasal valve collapse with structural grafting, and a bilateral  turbinate reduction. We discussed the medical necessity of this at length, as well as the risks and limitations of the procedures. All questions were answered.      Plan - Recommend reconstructive revision septorhinoplasty in setting of previous septorhinoplasty, nasal valve repair with structural grafting in setting of lateral crural insufficiency prompting lateral crura strut grafts and inferior turbinate reduction with lateralization. We discussed use of auricular cartilage in setting of previous septoplasty that may limit cartilage grafting options. She understands and wishes to proceed

## 2024-02-27 DIAGNOSIS — F41.9 ANXIETY: ICD-10-CM

## 2024-02-27 RX ORDER — CITALOPRAM 10 MG/1
10 TABLET ORAL DAILY
Qty: 90 TABLET | Refills: 1 | Status: SHIPPED | OUTPATIENT
Start: 2024-02-27

## 2024-03-02 ENCOUNTER — APPOINTMENT (OUTPATIENT)
Dept: PRIMARY CARE | Facility: CLINIC | Age: 59
End: 2024-03-02
Payer: COMMERCIAL

## 2024-03-04 ENCOUNTER — OFFICE VISIT (OUTPATIENT)
Dept: OTOLARYNGOLOGY | Facility: CLINIC | Age: 59
End: 2024-03-04
Payer: COMMERCIAL

## 2024-03-04 VITALS — BODY MASS INDEX: 33.01 KG/M2 | HEIGHT: 66 IN | WEIGHT: 205.4 LBS

## 2024-03-04 DIAGNOSIS — M95.0 NASAL VALVE COLLAPSE: ICD-10-CM

## 2024-03-04 DIAGNOSIS — J34.2 DEVIATED SEPTUM: Primary | ICD-10-CM

## 2024-03-04 DIAGNOSIS — R06.89 DIFFICULTY BREATHING: ICD-10-CM

## 2024-03-04 DIAGNOSIS — J34.3 HYPERTROPHY OF NASAL TURBINATES: ICD-10-CM

## 2024-03-04 PROCEDURE — 31231 NASAL ENDOSCOPY DX: CPT | Performed by: OTOLARYNGOLOGY

## 2024-03-04 PROCEDURE — 3008F BODY MASS INDEX DOCD: CPT | Performed by: OTOLARYNGOLOGY

## 2024-03-04 PROCEDURE — 1036F TOBACCO NON-USER: CPT | Performed by: OTOLARYNGOLOGY

## 2024-03-04 PROCEDURE — 99213 OFFICE O/P EST LOW 20 MIN: CPT | Performed by: OTOLARYNGOLOGY

## 2024-03-04 ASSESSMENT — PATIENT HEALTH QUESTIONNAIRE - PHQ9
2. FEELING DOWN, DEPRESSED OR HOPELESS: NOT AT ALL
1. LITTLE INTEREST OR PLEASURE IN DOING THINGS: NOT AT ALL
SUM OF ALL RESPONSES TO PHQ9 QUESTIONS 1 & 2: 0

## 2024-03-05 ENCOUNTER — TELEPHONE (OUTPATIENT)
Dept: OTOLARYNGOLOGY | Facility: CLINIC | Age: 59
End: 2024-03-05
Payer: COMMERCIAL

## 2024-03-05 DIAGNOSIS — J32.8 OTHER CHRONIC SINUSITIS: ICD-10-CM

## 2024-03-05 RX ORDER — MOMETASONE FUROATE 100 %
POWDER (GRAM) MISCELLANEOUS
Qty: 180 G | Refills: 3 | Status: SHIPPED | OUTPATIENT
Start: 2024-03-05 | End: 2024-03-06

## 2024-03-05 NOTE — TELEPHONE ENCOUNTER
Patient called into the office today stating she has been taking the Xhance as prescribed but for the last week she was experiencing dryness and bleeding from the right nostril. She also states that her sinuses swelled with quit a bit of pain. She reports it was hard to open her eyes between the swelling and right sided pressure/congestion. She did have a virtual visit at the clinic over the weekend where they prescribed her a medrol dose pack. She denies any discolored drainage at this time. She is wondering what nasal spray she should use next or what the next steps should be. Discussed with Dr. Fleming who offered patient OTC nasal steroid spray options such as Flonase, Rhinocort, Nasacort, however patient feels that these also cause excessive dryness and bleeding inside of her sinuses. Dr. Fleming then gave verbal order for Mometasone 1mg BID in nasal irrigations. Patient would like to pursue rinse based steroid. Nasal irrigations ordered. Order placed to [facility] for Mometasone 1mg twice a day per nasal irrigation for 90 days with 3 refills. Patient notified compounding pharmacy will call patient for further instruction and to obtain additional information. Patient instructed to call with any further questions.     Patient educated to perform nasal steroid rinse, followed by antihistamine spray and lastly saline gel for the nasal dryness.

## 2024-03-05 NOTE — PROGRESS NOTES
This is a pleasant 58-year-old right-handed woman with past medical history significant for anxiety, Hui's esophagus, BPPV, migraine, GERD, insomnia, hypothyroidism, IBS, RAMESH, who presents for follow-up of diffuse chronic pain.    She was last seen here on 10/16/2023, at which point I advised her to try Celebrex. She did try this and doesn't feel like it makes a huge difference in her pain.    I referred her to physical therapy for active strengthening exercises. She has not gone yet as she said PT at  is trying to get her to do her previous PT prescription instead of the current one.     I gave her information about fibromyalgia as well.    We obtained the EMG report from [facility] 8/25/2023 :report shows mild chronic motor axon loss changes in the right flexor digitorum longus muscle and isolation which is insufficient for a diagnosis.  No evidence of large fiber sensorimotor polyneuropathy in the lower extremities, no evidence of right L3, L4-S1 motor radiculopathy.  There is presence of chronic neurogenic motor unit potential changes limited to the intrinsic foot muscles which are of unclear significance and may be related to local foot trauma secondary to shoe wear.    She had another EMG done 1/4/24 Dr Mckeon: right mild L4-5 and L5-S1 radic. She also had a lumabr MRI done at [facility], report an images not available, reportedly mild narrowing at L4-5, L5-S1 bilaterally    She rates her pain as 4/10 today.    Otherwise, there have been no changes to her medications or past medical history since the last visit    ______________________________________  3/6/2024: Right head and neck, upper back and lower back trigger point injections, consider other medications and procedures in the future, proceed with physical therapy, follow-up with pain management physician, consider cervical spine MRI    _______________________________________  As a reminder:    TIMELINE OF COMPLAINT(S):    She feels that all of her  issues are tied together including the fact that she was diagnosed with Hashimoto's disease in 1994, chronic idiopathic urticaria, menopause in the past few years, etc., leading to diffuse chronic pain beginning about 3 years ago.  There was no inciting or traumatic event, but the pain started about 3 years ago she has multiple complaints:.     -Sensitive to touch everywhere in the past 3 years, increased aches and pains, may be associated with a bout of BPPV in 2020/2021, which took a while to recover from  -Increase grinding, jaw and head pain even with her bike ride  -Bilateral neck and elbow pain  -Weight continue rupture in June 2022 without a traumatic event, status post surgical repair, increase sedentary lifestyle for 4 months after this, and some medial forefoot and medial lower leg nerve pain since then.    -Low back and hip pain also   -Hard to sleep on both sides due to bursitis pain    Most bothersome is the upper back, jaw and forehead pain.      She is try to lose weight, about 30 pounds in the past 8 months according to the patient.      Pain:  LOCATION- Headaches, jaw, neck, upper back and bilateral arm, medial RLE  RADIATION-  ASSOCIATED WITH-None  NUMBNESS/TINGLING- R medial foot and lower leg  WEAKNESS- No  CONSTANT or INTERMITTENT- Constant  SEVERITY/QUANTITY- 5  QUALITY- Achy  EXACERBATED BY- Over doing it  BETTER WITH- Relax, ice  TRIED-       Anti-Inflammatories: (Allergy to NSAIDs), recently Celebrex prescribed  but hasn't started it yet, drug challenge scheduled for 11/17. previously Medrol Dosepak didn't help      Muscle relaxants: Flexeril helps a little but causes grogginess, previously Robaxin helps better than flexeril, tizanidine this causes nightmares      Anti-depressants:      Neuroleptics: Previously gabapentin prescribed but she never took it      LDN:    PHYSICAL THERAPY: Spring of 2021, PT again 1 year ago after peroneal rupture surgery. Minimal upper body work. Helped ROM and  pain a little. No HEP.   TENS unit: No  CHIROPRACTIC MANIPULATION: No  ACUPUNCTURE TREATMENTS: No  DEEP TISSUE MASSAGE THERAPY: Yes  OSTEOPATHIC MANIPULATION THERAPY: No  INJECTIONS: Elbow injections  EMG/NCS:  [facility] 8/25/2023 report shows mild chronic motor axon loss changes in the right flexor digitorum longus muscle and isolation which is insufficient for a diagnosis.  No evidence of large fiber sensorimotor polyneuropathy in the lower extremities, no evidence of right L3, L4-S1 motor radiculopathy.  There is presence of chronic neurogenic motor unit potential changes limited to the intrinsic foot muscles which are of unclear significance and may be related to local foot trauma secondary to shoe wear.    IMAGING: Yes, personally reviewed and interpreted today.    === 07/28/21 ===    MR BRAIN W AND WO CONTRAST    - Impression -  No acute intracranial abnormality was identified      == 03/02/23 ===    XR SHOULDER 2+ VIEWS RIGHT    - Impression -  Negative exam.    Cervical spine xray 1/19/23:  Straining of the normal cervical lordosis.  There are severe degenerative   disc disease C5-C6 and moderate degenerative disc disease C6-C7,   progressed from 12/07/2009.  There is left-sided facet arthropathy at   C3-C4 and C4-C5.  There is no prevertebral soft tissue swelling.       Lab Results   Component Value Date    HGBA1C 5.7 08/02/2023       FUNCTIONAL HISTORY: The patient is independent in all ADLs, mobility, and driving. The patient does not use any assistive device.    SH:  Lives in: [place]  Lives with: Self  Occupation: IT  Tobacco: No  Alcohol: No  Drugs: No    _________________________________  ROS: The patient denies any bowel or bladder incontinence/accidents, night sweats, fevers, chills, recent significant weight loss. A 14 point review of systems was reviewed with the patient and is as above and otherwise negative.  ROS questionnaire positive for headache, numbness/tingling, constipation, muscle  pain/tightness, back pain, depression, and anxiety.    Physical examination:    GEN - Alert, well-developed, well-nourished, no acute distress  PSYCH - Cooperative, appropriate mood and affect  HEENT - NC/AT  RESP - Non-labored respirations, equal expansion  CV - warm and well-perfused, No cyanosis or edema in extremities.  ABD- soft, ND, overweight  SKIN - No rash.    Previous:  There was significant tenderness essentially in every area palpated in the upper back, periscapular muscles, lumbar paraspinal, gluteal muscles, thighs, arms, elbows, knees and shins, ankles    NEURO -   UE strength 5/5 -  including shoulder abduction, biceps, triceps, wrist extensors, finger flexors, interossei, and    LE strength 5/5 -  including hip flexors, knee flexors, knee extensors, ankle dorsiflexors, ankle plantar flexors, and EHL   Sensation - intact to light touch in bilateral upper and lower extremities except diminished light touch in the right DP space and right medial ankle compared to left  Reflexes - 2+ biceps, brachioradialis, triceps, patellar and Achilles reflexes bilaterally No Clonus, No Babinski, Sparrow's negative bilaterally  GAIT - Normal base, normal stride length, non-antalgic. Able to toe walk with some difficulty due to right ankle pain and she was able to heel walk without difficulty.  Very stiff neck and back    PROCEDURE:    Lidocaine 1%- 8 cc's used, 0 cc's wasted  200mg/20mL (10mg/mL)  NDC 3924-6086-09  LOT [id number]  EXP 02/01/2025  Manuf: Hospira    Cervical and Thoracic trigger point injections:    Lumbar and Gluteal trigger point injections:    Description of the Procedure: The procedure, risks and alternative treatments were discussed with the patient. After written informed consent was obtained, the trigger points in the trapezius, levator scapula, scalene, masseter, rhomboid lumbar paraspinal,  gluteal muscles were palpated and marked. The skin was prepped three times with alcohol. Using a 27  gauge 1.5 inch needle, after negative aspiration, the trigger points in each muscle were injected with a total of 8 cc's of 1% lidocaine, spread equally into 8 sites. Twitch responses were observed in the musculature. The patient tolerated the procedure well with no immediate complications or bleeding.      Plan:   1. The patient was instructed in post-procedural care.  2. The patient was asked to apply moist heat and or ice for the next 24 hours and to perform daily gentle stretching exercises.     Physical Exam  HENT:      Head:     Musculoskeletal:        Back:         IMPRESSION:    This  is a pleasant 58-year-old right-handed woman with past medical history significant for anxiety, Hui's esophagus, BPPV, migraine, GERD, insomnia, hypothyroidism, IBS, RAMESH, who presents for follow-up of diffuse chronic pain.  Symptoms and physical exam findings in this complex patient are likely multifactorial in nature and due to combination of multilevel cervical and lumbar spondylosis, possible radiculopathy component, reactive myofascial pain/tension, possibly tibial neuropathy, fibromyalgia, exacerbated by sedentary lifestyle.    -Right head and neck, upper back, lower back trigger point injections performed as above.  There were no complications and she tolerated the procedure well.  She was provided with postprocedure instructions.  -Consider other medications in the future including gabapentin, Lyrica, Topamax, other muscle relaxants, nortriptyline etc.  -Physical therapy referral provided last time, for active strengthening exercises, some exercises provided as well  -Follow-up with pain management physician about exactly what kind of injections he had done and other options including RFA or XI  -Consider bursa injections  -Try to get lumbar MRI report and CD  - consider cervical spine MRI  - follow-up 4-6 weeks         The patient expressed understanding and agreement with the assessment and plan. Patient  encouraged to contact us should they have any questions, concerns, or any changes in symptoms.     Thank you for allowing me to participate in the care of your patient.      ** Dictated with voice recognition software, please forgive any errors in grammar and/or spelling **

## 2024-03-05 NOTE — PATIENT INSTRUCTIONS
-Ice on and off for the next 24 hours if injection sites are sore. Do gentle range of motion exercises in each area that was injected. Try to do them every hour for about half a minute or so, in every direction that the affected part goes. No pool, bath, or hot tub today. Avoid heavy lifting for the next 2 days.    -Consider other medications in the future including gabapentin, Lyrica, Topamax, other muscle relaxants, nortriptyline etc.  -Physical therapy referral provided last time, for active strengthening exercises, some exercises provided as well  -Follow-up with pain management physician about exactly what kind of injections he had done and other options including RFA or XI  -Consider bursa injections  -Try to get lumbar MRI report and CD  - consider cervical spine MRI  - follow-up 4-6 weeks

## 2024-03-06 ENCOUNTER — OFFICE VISIT (OUTPATIENT)
Dept: PHYSICAL MEDICINE AND REHAB | Facility: CLINIC | Age: 59
End: 2024-03-06
Payer: COMMERCIAL

## 2024-03-06 VITALS
TEMPERATURE: 96.9 F | OXYGEN SATURATION: 97 % | SYSTOLIC BLOOD PRESSURE: 127 MMHG | WEIGHT: 204 LBS | HEIGHT: 66 IN | BODY MASS INDEX: 32.78 KG/M2 | HEART RATE: 89 BPM | DIASTOLIC BLOOD PRESSURE: 80 MMHG

## 2024-03-06 DIAGNOSIS — M46.1 SACROILIAC INFLAMMATION (CMS-HCC): ICD-10-CM

## 2024-03-06 DIAGNOSIS — J32.8 OTHER CHRONIC SINUSITIS: ICD-10-CM

## 2024-03-06 DIAGNOSIS — M70.61 TROCHANTERIC BURSITIS OF RIGHT HIP: ICD-10-CM

## 2024-03-06 DIAGNOSIS — M54.59 MECHANICAL LOW BACK PAIN: ICD-10-CM

## 2024-03-06 DIAGNOSIS — R09.81 NASAL CONGESTION WITH RHINORRHEA: Primary | ICD-10-CM

## 2024-03-06 DIAGNOSIS — J34.89 NASAL CONGESTION WITH RHINORRHEA: Primary | ICD-10-CM

## 2024-03-06 DIAGNOSIS — M79.18 MYOFASCIAL PAIN: ICD-10-CM

## 2024-03-06 DIAGNOSIS — M47.812 CERVICAL SPONDYLOSIS: ICD-10-CM

## 2024-03-06 DIAGNOSIS — M79.7 FIBROMYALGIA: Primary | ICD-10-CM

## 2024-03-06 PROCEDURE — 3008F BODY MASS INDEX DOCD: CPT | Performed by: PHYSICAL MEDICINE & REHABILITATION

## 2024-03-06 PROCEDURE — 99215 OFFICE O/P EST HI 40 MIN: CPT | Performed by: PHYSICAL MEDICINE & REHABILITATION

## 2024-03-06 PROCEDURE — 1036F TOBACCO NON-USER: CPT | Performed by: PHYSICAL MEDICINE & REHABILITATION

## 2024-03-06 PROCEDURE — 20553 NJX 1/MLT TRIGGER POINTS 3/>: CPT | Performed by: PHYSICAL MEDICINE & REHABILITATION

## 2024-03-06 RX ORDER — MOMETASONE FUROATE 100 %
POWDER (GRAM) MISCELLANEOUS
Qty: 180 G | Refills: 3 | Status: SHIPPED | OUTPATIENT
Start: 2024-03-06

## 2024-03-06 RX ORDER — OLOPATADINE HYDROCHLORIDE 665 UG/1
2 SPRAY NASAL 2 TIMES DAILY
Qty: 30 G | Refills: 1 | Status: SHIPPED | OUTPATIENT
Start: 2024-03-06 | End: 2024-04-03

## 2024-03-06 ASSESSMENT — PAIN SCALES - GENERAL: PAINLEVEL: 4

## 2024-03-21 ENCOUNTER — TELEPHONE (OUTPATIENT)
Dept: OBSTETRICS AND GYNECOLOGY | Facility: CLINIC | Age: 59
End: 2024-03-21
Payer: COMMERCIAL

## 2024-03-21 NOTE — TELEPHONE ENCOUNTER
Nurse left message for pt to return call regarding pt's my chart message:        Kailyn Lopez Do [address] Clinical Support Staff  Phone Number: [phone]     Pierre Montes De Oca  Last night I started bleeding. This is the first time in the last prob 3 years.  Is this normal?  I started getting some breakouts on my chin this past week or so.  Could this be related to my thyroid levels? ( I have hashimotos)  Also I am overdue for my pap and pelvis  exam. I probably should address that.    Thank you          Period

## 2024-03-22 ENCOUNTER — TELEPHONE (OUTPATIENT)
Dept: OBSTETRICS AND GYNECOLOGY | Facility: CLINIC | Age: 59
End: 2024-03-22

## 2024-03-22 ENCOUNTER — OFFICE VISIT (OUTPATIENT)
Dept: PRIMARY CARE | Facility: CLINIC | Age: 59
End: 2024-03-22
Payer: COMMERCIAL

## 2024-03-22 ENCOUNTER — DOCUMENTATION (OUTPATIENT)
Dept: OBSTETRICS AND GYNECOLOGY | Facility: CLINIC | Age: 59
End: 2024-03-22

## 2024-03-22 VITALS
DIASTOLIC BLOOD PRESSURE: 80 MMHG | SYSTOLIC BLOOD PRESSURE: 116 MMHG | OXYGEN SATURATION: 98 % | BODY MASS INDEX: 33.27 KG/M2 | HEIGHT: 66 IN | WEIGHT: 207 LBS | HEART RATE: 82 BPM

## 2024-03-22 DIAGNOSIS — K21.00 GASTROESOPHAGEAL REFLUX DISEASE WITH ESOPHAGITIS WITHOUT HEMORRHAGE: ICD-10-CM

## 2024-03-22 DIAGNOSIS — M77.02 MEDIAL EPICONDYLITIS OF LEFT ELBOW: ICD-10-CM

## 2024-03-22 DIAGNOSIS — N95.0 POSTMENOPAUSAL BLEEDING: Primary | ICD-10-CM

## 2024-03-22 DIAGNOSIS — N93.9 VAGINAL BLEEDING: ICD-10-CM

## 2024-03-22 DIAGNOSIS — N95.1 MENOPAUSE SYNDROME: ICD-10-CM

## 2024-03-22 DIAGNOSIS — F41.9 ANXIETY: Primary | ICD-10-CM

## 2024-03-22 DIAGNOSIS — R10.11 RUQ PAIN: ICD-10-CM

## 2024-03-22 PROCEDURE — 3008F BODY MASS INDEX DOCD: CPT | Performed by: STUDENT IN AN ORGANIZED HEALTH CARE EDUCATION/TRAINING PROGRAM

## 2024-03-22 PROCEDURE — 1036F TOBACCO NON-USER: CPT | Performed by: STUDENT IN AN ORGANIZED HEALTH CARE EDUCATION/TRAINING PROGRAM

## 2024-03-22 PROCEDURE — 99214 OFFICE O/P EST MOD 30 MIN: CPT | Performed by: STUDENT IN AN ORGANIZED HEALTH CARE EDUCATION/TRAINING PROGRAM

## 2024-03-22 RX ORDER — BUSPIRONE HYDROCHLORIDE 5 MG/1
10 TABLET ORAL DAILY PRN
Qty: 60 TABLET | Refills: 2 | Status: SHIPPED | OUTPATIENT
Start: 2024-03-22 | End: 2025-03-22

## 2024-03-22 ASSESSMENT — ENCOUNTER SYMPTOMS
ABDOMINAL PAIN: 1
SLEEP DISTURBANCE: 1
NERVOUS/ANXIOUS: 1
ARTHRALGIAS: 1
BACK PAIN: 1
FATIGUE: 1

## 2024-03-22 NOTE — PROGRESS NOTES
Subjective   Patient ID: Kailyn Lopez is a 58 y.o. female who presents for Follow-up (Follow up - Go over meds ).  Had a recent procedure for her medial epicondylitis 1 week ago. Needs to limit movement for 4-6 weeks.     Last pap was 2019. Reports normal.     Her HRT was recently increased in last 5 months. Has been on lower dose since 2022. Is on both estrogen and progesterone. This week she started getting vaginal bleeding. Contacted her gyn and they are recommending a pap and pelvic US.     Stress levels have been a little better.  Taking the buspar, has been helping. Still plans to see counselor.     Nasal pressure is feeling better. Got steroids from her ENT which resolved her symptoms.     Sometimes metformin bothers her stomach.     No other concerns today.         Review of Systems   Constitutional:  Positive for fatigue.   Gastrointestinal:  Positive for abdominal pain.   Genitourinary:  Positive for vaginal bleeding.   Musculoskeletal:  Positive for arthralgias and back pain.   Psychiatric/Behavioral:  Positive for sleep disturbance. The patient is nervous/anxious.        Objective   Physical Exam  Vitals reviewed.   Constitutional:       Appearance: Normal appearance.   HENT:      Head: Normocephalic.      Mouth/Throat:      Mouth: Mucous membranes are moist.   Eyes:      Pupils: Pupils are equal, round, and reactive to light.   Cardiovascular:      Rate and Rhythm: Normal rate and regular rhythm.   Pulmonary:      Effort: No respiratory distress.      Breath sounds: Normal breath sounds. No wheezing or rhonchi.   Musculoskeletal:         General: Normal range of motion.   Skin:     General: Skin is warm and dry.   Neurological:      General: No focal deficit present.      Mental Status: She is alert. Mental status is at baseline.   Psychiatric:         Mood and Affect: Mood normal.         Behavior: Behavior normal.         Body mass index is 33.41 kg/m².      Current Outpatient Medications:      albuterol 90 mcg/actuation inhaler, Inhale 1-2 puffs every 4 hours if needed for wheezing. EVERY 4 TO 6 HOURS AS NEEDED., Disp: 18 g, Rfl: 3    carbinoxamine maleate 4 mg tablet, Take by mouth., Disp: , Rfl:     celecoxib (CeleBREX) 200 mg capsule, Take 1 capsule (200 mg) by mouth 2 times a day., Disp: 60 capsule, Rfl: 2    cholecalciferol (Vitamin D-3) 125 MCG (5000 UT) capsule, Take 1 capsule (125 mcg) by mouth once daily., Disp: , Rfl:     citalopram (CeleXA) 10 mg tablet, TAKE 1 TABLET BY MOUTH EVERY DAY, Disp: 90 tablet, Rfl: 1    citalopram (CeleXA) 20 mg tablet, Take 1 tablet (20 mg) by mouth once daily., Disp: 90 tablet, Rfl: 1    estradiol (Estrace) 1 mg tablet, Take 1 tablet (1 mg) by mouth once daily., Disp: , Rfl:     famotidine (Pepcid) 20 mg tablet, Take 1 tablet (20 mg) by mouth once daily., Disp: , Rfl:     fluticasone propionate (Xhance) 93 mcg/actuation aerosol breath activated, Administer 1 puff into each nostril 2 times a day., Disp: , Rfl:     ipratropium (Atrovent) 21 mcg (0.03 %) nasal spray, Administer 2 sprays into each nostril 3 times a day as needed., Disp: , Rfl:     levothyroxine (Synthroid, Levoxyl) 100 mcg tablet, Take 1 tablet (100 mcg) by mouth once daily., Disp: , Rfl:     liothyronine (Cytomel) 5 mcg tablet, Take 0.5 tablets (2.5 mcg) by mouth once daily., Disp: , Rfl:     metFORMIN  mg 24 hr tablet, TAKE 1 TABLET (500 MG) BY MOUTH ONCE DAILY IN THE EVENING. TAKE WITH MEALS. DO NOT CRUSH, CHEW, OR SPLIT., Disp: 90 tablet, Rfl: 1    mometasone furoate, bulk, 100 % powder, Compound 1 mg capsule to be added to sinus rinse twice daily., Disp: 180 g, Rfl: 3    olopatadine (Patanase) 0.6 % spray,non-aerosol nasal spray, Administer 2 sprays into affected nostril(s) 2 times a day., Disp: 30 g, Rfl: 1    omeprazole (PriLOSEC) 40 mg DR capsule, Take 1 capsule (40 mg) by mouth once daily as needed. IN THE MORNING. 60 min BEFORE a MEAL, Disp: , Rfl:     pirbuterol acetate (MAXAIR  AUTOHALER INHL), 200 mcg., Disp: , Rfl:     progesterone (Prometrium) 100 mg capsule, Take 1 capsule (100 mg) by mouth once daily at bedtime., Disp: , Rfl:     semaglutide (Ozempic) 0.25 mg or 0.5 mg(2 mg/1.5 mL) pen injector, Inject 0.5mg weekly (Patient taking differently: Inject 0.25 mg under the skin 1 (one) time per week. Inject 0.5mg weekly), Disp: 3 mL, Rfl: 2    SUMAtriptan (Imitrex) 50 mg tablet, Take 1 tablet (50 mg) by mouth if needed for migraine for up to 1 dose. FOR MIGRAINE RELIEF. MAY REPEAT EVERY 2 HOURS MAXIMUM  MG PER DAY, Disp: 9 tablet, Rfl: 11    ubrogepant (Ubrelvy) 100 mg tablet tablet, Take 1 tablet (100 mg) by mouth if needed (At onset of migraine.  Okay to repeat in 2 hours if needed.)., Disp: 16 tablet, Rfl: 3    busPIRone (Buspar) 5 mg tablet, Take 2 tablets (10 mg) by mouth once daily as needed (anxiety)., Disp: 60 tablet, Rfl: 2      Assessment/Plan   Diagnoses and all orders for this visit:  Anxiety  Comments:  doing well on 30mg citalopram and buspar  agree with purusing counseling for coping [place]  Orders:  -     busPIRone (Buspar) 5 mg tablet; Take 2 tablets (10 mg) by mouth once daily as needed (anxiety).  Gastroesophageal reflux disease with esophagitis without hemorrhage  Comments:  stable  Vaginal bleeding  Comments:  discussed multiple possible etiologies  agree with pelvic US ordered by gyn  Menopause syndrome  Medial epicondylitis of left elbow  Comments:  recent tenjet procedure  continue rehab as planned  BMI 33.0-33.9,adult    The patient received Provided instructions on dietary changes because they have an above normal BMI.       Follow up in 6 months or sooner as needed    Oneyda Rosado DO 03/22/24 4:31 PM

## 2024-03-22 NOTE — TELEPHONE ENCOUNTER
Pt verified by name and .  Pt is aware nurse discuss with Tavon Willingahm pt having bleeding first time in 3 years.  Per Tavon Willingham pt to have pelvic ultrasound done, Pt will call office after ultrasound nurse will schedule pt for appt.  Pt thinks he had pap done at [facility]. Nurse could not fine any pap results.  Pt has no questions at this time.

## 2024-03-26 ENCOUNTER — HOSPITAL ENCOUNTER (OUTPATIENT)
Dept: RADIOLOGY | Facility: CLINIC | Age: 59
Discharge: HOME | End: 2024-03-26
Payer: COMMERCIAL

## 2024-03-26 DIAGNOSIS — N95.0 POSTMENOPAUSAL BLEEDING: ICD-10-CM

## 2024-03-26 PROCEDURE — 76856 US EXAM PELVIC COMPLETE: CPT

## 2024-03-26 PROCEDURE — 76830 TRANSVAGINAL US NON-OB: CPT | Performed by: RADIOLOGY

## 2024-03-26 PROCEDURE — 76856 US EXAM PELVIC COMPLETE: CPT | Performed by: RADIOLOGY

## 2024-03-28 ENCOUNTER — TELEPHONE (OUTPATIENT)
Dept: OBSTETRICS AND GYNECOLOGY | Facility: CLINIC | Age: 59
End: 2024-03-28
Payer: COMMERCIAL

## 2024-03-28 NOTE — TELEPHONE ENCOUNTER
Pt verified by name and .  Pt is aware Tavon Willingham can see her 2024 at 4 pm.  Pt can make appt.  Nurse scheduled pt.  Pt has no questions at this time.

## 2024-04-02 DIAGNOSIS — J34.89 NASAL CONGESTION WITH RHINORRHEA: ICD-10-CM

## 2024-04-02 DIAGNOSIS — R09.81 NASAL CONGESTION WITH RHINORRHEA: ICD-10-CM

## 2024-04-03 ENCOUNTER — OFFICE VISIT (OUTPATIENT)
Dept: OBSTETRICS AND GYNECOLOGY | Facility: CLINIC | Age: 59
End: 2024-04-03
Payer: COMMERCIAL

## 2024-04-03 VITALS
BODY MASS INDEX: 33.91 KG/M2 | DIASTOLIC BLOOD PRESSURE: 78 MMHG | HEIGHT: 66 IN | SYSTOLIC BLOOD PRESSURE: 124 MMHG | WEIGHT: 211 LBS

## 2024-04-03 DIAGNOSIS — N95.0 POSTMENOPAUSAL BLEEDING: Primary | ICD-10-CM

## 2024-04-03 DIAGNOSIS — Z12.4 CERVICAL CANCER SCREENING: ICD-10-CM

## 2024-04-03 PROCEDURE — 87624 HPV HI-RISK TYP POOLED RSLT: CPT

## 2024-04-03 PROCEDURE — 3008F BODY MASS INDEX DOCD: CPT | Performed by: NURSE PRACTITIONER

## 2024-04-03 PROCEDURE — 88175 CYTOPATH C/V AUTO FLUID REDO: CPT

## 2024-04-03 RX ORDER — OLOPATADINE HYDROCHLORIDE 665 UG/1
SPRAY NASAL
Qty: 91.5 G | Refills: 1 | Status: SHIPPED | OUTPATIENT
Start: 2024-04-03

## 2024-04-03 ASSESSMENT — ENCOUNTER SYMPTOMS
NEUROLOGICAL NEGATIVE: 0
CONSTITUTIONAL NEGATIVE: 0
MUSCULOSKELETAL NEGATIVE: 0
RESPIRATORY NEGATIVE: 0
CARDIOVASCULAR NEGATIVE: 0
EYES NEGATIVE: 0
ALLERGIC/IMMUNOLOGIC NEGATIVE: 0
ENDOCRINE NEGATIVE: 0
HEMATOLOGIC/LYMPHATIC NEGATIVE: 0
GASTROINTESTINAL NEGATIVE: 0
PSYCHIATRIC NEGATIVE: 0

## 2024-04-03 ASSESSMENT — PAIN SCALES - GENERAL: PAINLEVEL: 0-NO PAIN

## 2024-04-03 NOTE — PROGRESS NOTES
Subjective   Patient ID: Kailyn Lopez is a 58 y.o. female who presents for GYN Talk.  HPI  Had dysmenorrhea and acne; followed by PMB for 1 day   US on 3/24; 5mm endometrium thickness  MHT: 100mg po progesterone; 1mg po po estradiol since 2021  Reports she has been taking her progesterone as prescribed    Age at menopause 57    Patient ID: Kailyn Lopez is a 58 y.o. female.    Endometrial biopsy    Date/Time: 4/3/2024 9:19 AM    Performed by: MIKAELA Vivas  Authorized by: MIKAELA Vivas    Consent:     Consent obtained: written    Consent given by: patient    Risks discussed: bleeding, infection and pain    Alternatives discussed: referral and observation    Patient agrees, verbalizes understanding, and wants to proceed: yes      Procedure explained and questions answered to patient or proxy's satisfaction: yes    Indications:     Indications: postmenopausal bleeding      Chronicity of post-menopausal bleeding: new    Progression of post-menopausal bleeding: improving  Pre-procedure:     Urine pregnancy test: N/A    Procedure:     A bimanual exam was performed: yes      Uterus size: non-gravid    Prepped with: Betadine    Tenaculum used: yes      A local block was performed: yes      Block method: paracervical block      Local anesthetic: lidocaine 1% w/o epi    Amount used (mL): 4    Cervix dilated: no    Findings:     Cervix: stenotic    Comments:     Procedure comments: Unable to dilate cervix d/t stenosis, procedure stopped     Review of Systems    Objective   Physical Exam  Genitourinary:     General: Normal vulva.      Vagina: Normal.      Cervix: Normal.      Comments: Small amount of dark blood noted        Assessment/Plan   Diagnoses and all orders for this visit:  Postmenopausal bleeding  Cervical cancer screening  -     THINPREP PAP TEST  Other orders  -     Endometrial biopsy           MIKAELA Vivas 04/03/24 9:10 AM

## 2024-04-08 ENCOUNTER — APPOINTMENT (OUTPATIENT)
Dept: OBSTETRICS AND GYNECOLOGY | Facility: CLINIC | Age: 59
End: 2024-04-08
Payer: COMMERCIAL

## 2024-04-09 LAB
CYTOLOGY CMNT CVX/VAG CYTO-IMP: NORMAL
HPV HR 12 DNA GENITAL QL NAA+PROBE: NEGATIVE
HPV HR GENOTYPES PNL CVX NAA+PROBE: NEGATIVE
HPV16 DNA SPEC QL NAA+PROBE: NEGATIVE
HPV18 DNA SPEC QL NAA+PROBE: NEGATIVE
LAB AP HPV GENOTYPE QUESTION: YES
LAB AP HPV HR: NORMAL
LABORATORY COMMENT REPORT: NORMAL
PATH REPORT.TOTAL CANCER: NORMAL

## 2024-04-14 NOTE — PATIENT INSTRUCTIONS
-Ice on and off for the next 24 hours if injection sites are sore. Do gentle range of motion exercises in each area that was injected. Try to do them every hour for about half a minute or so, in every direction that the affected part goes. No pool, bath, or hot tub today. Avoid heavy lifting for the next 2 days.    -Consider other medications in the future including gabapentin, Lyrica, Topamax, other muscle relaxants, nortriptyline etc.  -Physical therapy referral provided last time, for active strengthening exercises, some exercises provided as well  -Follow-up with pain management physician about exactly what kind of injections he had done and other options including RFA or XI  -Consider bursa injections  - consider cervical spine MRI  -Consider Botox injections to the masseter and SCM as well  - follow-up 6-8 weeks

## 2024-04-14 NOTE — PROGRESS NOTES
"This is a pleasant 58-year-old right-handed woman with past medical history significant for anxiety, Hui's esophagus, BPPV, migraine, GERD, insomnia, hypothyroidism, IBS, RAMESH, who presents for follow-up of diffuse chronic pain.    She was last seen here on 3/6/2024, at which point I did right-sided neck, upper back and lower back trigger point injections. This helped about 90% for 2 weeks, not quite back to square one, but getting there.  She would like repeat injections today.    I advised her to consider medications.    I advised her to proceed with physical therapy and do daily home exercises.      She had another EMG at [facility] on 3/18/2024:   IMPRESSION:   This is an abnormal study.  There is electrophysiologic evidence most consistent with a chronic right L5 radiculopathy without observed evidence of active axonal loss.    Given the relatively intact nerve conduction studies, there is no evidence for a large fiber peripheral polyneuropathy, peroneal entrapment neuropathy, or sciatic mononeuropathy in the right lower extremity.    She underwent a minimally invasive tenotomy (tenjet) for her medial epicondylitis of left elbow at [facility] on 3/15/2024, note personally reviewed today. \"This is coming along\"    She underwent prolotherapy for the right foot at [facility], note personally reviewed today. 3/20/24. Felt good temporarily with the lidocaine, but no difference after that.    She sends me reports from now but radiology regarding her lumbar MRI and right ankle MRI:    Right foot MRI 3/29/2024: Mild osteoarthritis of the first metatarsal phalangeal joint, no evidence of bony contusion fracture or AVN, mild edema of the intrinsic muscle of the foot in the first intermetatarsal space    Right ankle MRI 3/29/2024: No synovitis in the tibialis posterior, flexor digitorum longus, flexor hallucis longus, peroneus longus and brevis tendons.  Sprain of the deltoid ligamentous complexes without evidence of tear, no contusion " fracture or AVN    Lumbar MRI 12/28/2023:  Left foraminal herniation and facet hypertrophy at L4-5, moderate left and mild right foraminal stenosis, grade 1 with listhesis bulging disc with right paracentral herniation and annular tear and facet hypertrophy at L5-S1, right S1 and probable right L5 nerve root impingement    Lumbar x-ray 12/21/2023: Multilevel disc disease and facet arthrosis L2-3, L5-S1, osteophyte formation L3-4 and L4-5.    She rates her pain as 4/10 today.    Otherwise, there have been no changes to her medications or past medical history since the last visit    ______________________________________  3/6/2024: Right head and neck, upper back and lower back trigger point injections, consider other medications and procedures in the future, proceed with physical therapy, follow-up with pain management physician, consider cervical spine MRI  4/15/2024: Bilateral head and neck, right lower back trigger point injections, continue physical therapy, home exercises, consider Botox to the neck muscles as well  _______________________________________  As a reminder:    TIMELINE OF COMPLAINT(S):    She feels that all of her issues are tied together including the fact that she was diagnosed with Hashimoto's disease in 1994, chronic idiopathic urticaria, menopause in the past few years, etc., leading to diffuse chronic pain beginning about 3 years ago.  There was no inciting or traumatic event, but the pain started about 3 years ago she has multiple complaints:.     -Sensitive to touch everywhere in the past 3 years, increased aches and pains, may be associated with a bout of BPPV in 2020/2021, which took a while to recover from  -Increase grinding, jaw and head pain even with her bike ride  -Bilateral neck and elbow pain  -Weight continue rupture in June 2022 without a traumatic event, status post surgical repair, increase sedentary lifestyle for 4 months after this, and some medial forefoot and medial lower  leg nerve pain since then.    -Low back and hip pain also   -Hard to sleep on both sides due to bursitis pain    Most bothersome is the upper back, jaw and forehead pain.      She is try to lose weight, about 30 pounds in the past 8 months according to the patient.      Pain:  LOCATION- Headaches, jaw, neck, upper back and bilateral arm, medial RLE  RADIATION-  ASSOCIATED WITH-None  NUMBNESS/TINGLING- R medial foot and lower leg  WEAKNESS- No  CONSTANT or INTERMITTENT- Constant  SEVERITY/QUANTITY- 5  QUALITY- Achy  EXACERBATED BY- Over doing it  BETTER WITH- Relax, ice  TRIED-       Anti-Inflammatories: (Allergy to NSAIDs), recently Celebrex prescribed  but hasn't started it yet, drug challenge scheduled for 11/17. previously Medrol Dosepak didn't help      Muscle relaxants: Flexeril helps a little but causes grogginess, previously Robaxin helps better than flexeril, tizanidine this causes nightmares      Anti-depressants:      Neuroleptics: Previously gabapentin prescribed but she never took it      LDN:    PHYSICAL THERAPY: Spring of 2021, PT again 1 year ago after peroneal rupture surgery. Minimal upper body work. Helped ROM and pain a little. No HEP.   TENS unit: No  CHIROPRACTIC MANIPULATION: No  ACUPUNCTURE TREATMENTS: No  DEEP TISSUE MASSAGE THERAPY: Yes  OSTEOPATHIC MANIPULATION THERAPY: No  INJECTIONS: Elbow injections  EMG/NCS:    -[facility] 8/25/2023 report shows mild chronic motor axon loss changes in the right flexor digitorum longus muscle and isolation which is insufficient for a diagnosis.  No evidence of large fiber sensorimotor polyneuropathy in the lower extremities, no evidence of right L3, L4-S1 motor radiculopathy.  There is presence of chronic neurogenic motor unit potential changes limited to the intrinsic foot muscles which are of unclear significance and may be related to local foot trauma secondary to shoe wear.  -She had another EMG done 1/4/24 Dr Mckeon: right mild L4-5 and L5-S1 radic. She  also had a lumabr MRI done at [facility], report an images not available, reportedly mild narrowing at L4-5, L5-S1 bilaterally  -She had another EMG at [facility] on 3/18/2024:   IMPRESSION:   This is an abnormal study.  There is electrophysiologic evidence most consistent with a chronic right L5 radiculopathy without observed evidence of active axonal loss.    Given the relatively intact nerve conduction studies, there is no evidence for a large fiber peripheral polyneuropathy, peroneal entrapment neuropathy, or sciatic mononeuropathy in the right lower extremity.    IMAGING: Yes, personally reviewed and interpreted today.    === 07/28/21 ===    MR BRAIN W AND WO CONTRAST    - Impression -  No acute intracranial abnormality was identified      == 03/02/23 ===    XR SHOULDER 2+ VIEWS RIGHT    - Impression -  Negative exam.    Cervical spine xray 1/19/23:  Straining of the normal cervical lordosis.  There are severe degenerative   disc disease C5-C6 and moderate degenerative disc disease C6-C7,   progressed from 12/07/2009.  There is left-sided facet arthropathy at   C3-C4 and C4-C5.  There is no prevertebral soft tissue swelling.       Lab Results   Component Value Date    HGBA1C 5.7 08/02/2023       FUNCTIONAL HISTORY: The patient is independent in all ADLs, mobility, and driving. The patient does not use any assistive device.    SH:  Lives in: [place]  Lives with: Self  Occupation: IT  Tobacco: No  Alcohol: No  Drugs: No    _________________________________  ROS: The patient denies any bowel or bladder incontinence/accidents, night sweats, fevers, chills, recent significant weight loss. A 14 point review of systems was reviewed with the patient and is as above and otherwise negative.  ROS questionnaire positive for headache, numbness and tingling, constipation, muscle pain/tightness/spasms, back pain    Physical examination:    GEN - Alert, well-developed, well-nourished, no acute distress  PSYCH - Cooperative, appropriate mood  and affect  HEENT - NC/AT  RESP - Non-labored respirations, equal expansion  CV - warm and well-perfused, No cyanosis or edema in extremities.  ABD- soft, ND, overweight  SKIN - No rash.    Previous:  There was significant tenderness essentially in every area palpated in the upper back, periscapular muscles, lumbar paraspinal, gluteal muscles, thighs, arms, elbows, knees and shins, ankles    NEURO -   UE strength 5/5 -  including shoulder abduction, biceps, triceps, wrist extensors, finger flexors, interossei, and    LE strength 5/5 -  including hip flexors, knee flexors, knee extensors, ankle dorsiflexors, ankle plantar flexors, and EHL   Sensation - intact to light touch in bilateral upper and lower extremities except diminished light touch in the right DP space and right medial ankle compared to left  Reflexes - 2+ biceps, brachioradialis, triceps, patellar and Achilles reflexes bilaterally No Clonus, No Babinski, Sparrow's negative bilaterally  GAIT - Normal base, normal stride length, non-antalgic. Able to toe walk with some difficulty due to right ankle pain and she was able to heel walk without difficulty.  Very stiff neck and back    PROCEDURE:    Lidocaine 1%- 10 cc's used, 0 cc's wasted  200mg/20mL (10mg/mL)  NDC 6242-5403-07  LOT [id number]  EXP 02/01/2025  Manuf: Hospira    Cervical and Thoracic trigger point injections:    Lumbar and Gluteal trigger point injections:    Description of the Procedure: The procedure, risks and alternative treatments were discussed with the patient. After written informed consent was obtained, the trigger points in the trapezius, levator scapula, scalene, masseter, rhomboid, IS, lumbar paraspinal,  gluteal muscles were palpated and marked. The skin was prepped three times with alcohol. Using a 27 gauge 1.5 inch needle, after negative aspiration, the trigger points in each muscle were injected with a total of 10 cc's of 1% lidocaine, spread equally into 13 sites. Twitch  responses were observed in the musculature. The patient tolerated the procedure well with no immediate complications or bleeding.      Plan:   1. The patient was instructed in post-procedural care.  2. The patient was asked to apply moist heat and or ice for the next 24 hours and to perform daily gentle stretching exercises.     Physical Exam  Constitutional:        HENT:      Head:     Musculoskeletal:        Back:         IMPRESSION:    This  is a pleasant 58-year-old right-handed woman with past medical history significant for anxiety, Hui's esophagus, BPPV, migraine, GERD, insomnia, hypothyroidism, IBS, RAMESH, who presents for follow-up of diffuse chronic pain.  Symptoms and physical exam findings in this complex patient are likely multifactorial in nature and due to combination of multilevel cervical and lumbar spondylosis, possible radiculopathy component, reactive myofascial pain/tension, possibly tibial neuropathy, fibromyalgia, exacerbated by sedentary lifestyle.    -Bilateral head and neck, upper back, right lower back trigger point injections performed as above.  There were no complications and she tolerated the procedure well.  She was provided with postprocedure instructions.  -Consider other medications in the future including gabapentin, Lyrica, Topamax, other muscle relaxants, nortriptyline etc.  -Physical therapy referral provided last time, for active strengthening exercises, some exercises provided as well  -Follow-up with pain management physician about exactly what kind of injections he had done and other options including RFA or XI  -Consider bursa injections  - consider cervical spine MRI  -Consider Botox injections to the masseter and SCM as well  - follow-up 6-8 weeks         The patient expressed understanding and agreement with the assessment and plan. Patient encouraged to contact us should they have any questions, concerns, or any changes in symptoms.     Thank you for allowing me to  participate in the care of your patient.      ** Dictated with voice recognition software, please forgive any errors in grammar and/or spelling **

## 2024-04-15 ENCOUNTER — OFFICE VISIT (OUTPATIENT)
Dept: PHYSICAL MEDICINE AND REHAB | Facility: CLINIC | Age: 59
End: 2024-04-15
Payer: COMMERCIAL

## 2024-04-15 VITALS
HEART RATE: 80 BPM | RESPIRATION RATE: 18 BRPM | DIASTOLIC BLOOD PRESSURE: 84 MMHG | HEIGHT: 66 IN | SYSTOLIC BLOOD PRESSURE: 124 MMHG | WEIGHT: 211 LBS | BODY MASS INDEX: 33.91 KG/M2 | TEMPERATURE: 97.8 F

## 2024-04-15 DIAGNOSIS — M54.59 MECHANICAL LOW BACK PAIN: ICD-10-CM

## 2024-04-15 DIAGNOSIS — M79.7 FIBROMYALGIA: ICD-10-CM

## 2024-04-15 DIAGNOSIS — M70.61 TROCHANTERIC BURSITIS OF RIGHT HIP: Primary | ICD-10-CM

## 2024-04-15 DIAGNOSIS — M47.812 CERVICAL SPONDYLOSIS: ICD-10-CM

## 2024-04-15 DIAGNOSIS — M46.1 SACROILIAC INFLAMMATION (CMS-HCC): ICD-10-CM

## 2024-04-15 DIAGNOSIS — M79.18 MYOFASCIAL PAIN: ICD-10-CM

## 2024-04-15 PROCEDURE — 99214 OFFICE O/P EST MOD 30 MIN: CPT | Performed by: PHYSICAL MEDICINE & REHABILITATION

## 2024-04-15 PROCEDURE — 20553 NJX 1/MLT TRIGGER POINTS 3/>: CPT | Performed by: PHYSICAL MEDICINE & REHABILITATION

## 2024-04-15 PROCEDURE — 3008F BODY MASS INDEX DOCD: CPT | Performed by: PHYSICAL MEDICINE & REHABILITATION

## 2024-04-15 ASSESSMENT — PAIN SCALES - GENERAL: PAINLEVEL: 2

## 2024-04-26 ENCOUNTER — APPOINTMENT (OUTPATIENT)
Dept: OTOLARYNGOLOGY | Facility: CLINIC | Age: 59
End: 2024-04-26
Payer: COMMERCIAL

## 2024-05-07 ENCOUNTER — APPOINTMENT (OUTPATIENT)
Dept: OBSTETRICS AND GYNECOLOGY | Facility: CLINIC | Age: 59
End: 2024-05-07
Payer: COMMERCIAL

## 2024-05-07 NOTE — PROGRESS NOTES
Patient ID: Kailyn Lopez is a 58 y.o. female.    Head/Face/Jaw Botulinum Injection    Date/Time: 5/8/2024 12:10 PM    Performed by: Evelyn Robles MD  Authorized by: Evelyn Robles MD      Consent:      Consent obtained:  Verbal     Consent given by:  Patient    Procedure details:      EMG used?  No     Electrical stimulation used?  No     Diluted by:  Preservative free saline     Toxin (Brand):  [id number] (Botox)     Total units available:  200       Ad hoc region injected:  Head see diagram with 200 units     Total units injected:  200     Total units wasted:  0    Post-procedure details:      Patient tolerance of procedure:  Tolerated well, no immediate complications    Comments: Please do not rub areas for 24 hours.  No pressure above eyebrows for 24 hours.  Watch out for helmets, headlamps, headbands, goggles, or massage for 24 hours.  If there is discomfort, ice for the first 24 hour,s heat after that.  Headaches may worsen, or you may experience neck stiffness.  If this occurs use your usual headache medication or a mild anti inflammatory such as advil or aleve.  Please call if you have difficulty swallowing.

## 2024-05-08 ENCOUNTER — PROCEDURE VISIT (OUTPATIENT)
Dept: NEUROLOGY | Facility: CLINIC | Age: 59
End: 2024-05-08
Payer: COMMERCIAL

## 2024-05-08 ENCOUNTER — PREP FOR PROCEDURE (OUTPATIENT)
Dept: OBSTETRICS AND GYNECOLOGY | Facility: CLINIC | Age: 59
End: 2024-05-08

## 2024-05-08 ENCOUNTER — OFFICE VISIT (OUTPATIENT)
Dept: OBSTETRICS AND GYNECOLOGY | Facility: CLINIC | Age: 59
End: 2024-05-08
Payer: COMMERCIAL

## 2024-05-08 VITALS
SYSTOLIC BLOOD PRESSURE: 130 MMHG | HEIGHT: 66 IN | WEIGHT: 213 LBS | BODY MASS INDEX: 34.23 KG/M2 | DIASTOLIC BLOOD PRESSURE: 80 MMHG

## 2024-05-08 VITALS
WEIGHT: 213 LBS | SYSTOLIC BLOOD PRESSURE: 122 MMHG | BODY MASS INDEX: 34.38 KG/M2 | TEMPERATURE: 98.6 F | DIASTOLIC BLOOD PRESSURE: 82 MMHG

## 2024-05-08 DIAGNOSIS — G43.711 INTRACTABLE CHRONIC MIGRAINE WITHOUT AURA AND WITH STATUS MIGRAINOSUS: Primary | ICD-10-CM

## 2024-05-08 DIAGNOSIS — N95.0 POSTMENOPAUSAL BLEEDING: ICD-10-CM

## 2024-05-08 DIAGNOSIS — N95.0 POSTMENOPAUSAL BLEEDING: Primary | ICD-10-CM

## 2024-05-08 PROCEDURE — 1036F TOBACCO NON-USER: CPT | Performed by: OBSTETRICS & GYNECOLOGY

## 2024-05-08 PROCEDURE — 64615 CHEMODENERV MUSC MIGRAINE: CPT | Performed by: PSYCHIATRY & NEUROLOGY

## 2024-05-08 PROCEDURE — 99214 OFFICE O/P EST MOD 30 MIN: CPT | Performed by: OBSTETRICS & GYNECOLOGY

## 2024-05-08 PROCEDURE — 3008F BODY MASS INDEX DOCD: CPT | Performed by: OBSTETRICS & GYNECOLOGY

## 2024-05-08 RX ORDER — METHYLPREDNISOLONE 4 MG/1
TABLET ORAL
Qty: 21 TABLET | Refills: 0 | Status: SHIPPED | OUTPATIENT
Start: 2024-05-08 | End: 2024-05-15

## 2024-05-08 RX ORDER — GABAPENTIN 600 MG/1
600 TABLET ORAL ONCE
OUTPATIENT
Start: 2024-05-08 | End: 2024-05-08

## 2024-05-08 RX ORDER — ACETAMINOPHEN 325 MG/1
975 TABLET ORAL ONCE
OUTPATIENT
Start: 2024-05-08 | End: 2024-05-08

## 2024-05-08 RX ORDER — DOXYCYCLINE 40 MG/1
40 CAPSULE ORAL EVERY MORNING
COMMUNITY

## 2024-05-08 ASSESSMENT — ENCOUNTER SYMPTOMS
COUGH: 0
HEADACHES: 0
FREQUENCY: 0
DIARRHEA: 0
DIZZINESS: 0
PALPITATIONS: 0
CHILLS: 0
NAUSEA: 0
ABDOMINAL PAIN: 0
FEVER: 0
HEMATURIA: 0
DYSURIA: 0
CONSTIPATION: 0
VOMITING: 0
WEAKNESS: 0
SHORTNESS OF BREATH: 0

## 2024-05-08 ASSESSMENT — PAIN SCALES - GENERAL: PAINLEVEL: 0-NO PAIN

## 2024-05-08 NOTE — PROGRESS NOTES
SUBJECTIVE    58 y.o.  Postmenopausal presents for   Chief Complaint   Patient presents with    New Patient Visit     Here for post menopausal bleeding  Madelin Marrero CMA        Patient with episode of post-menopausal bleeding on hormone therapy. Endometrial biopsy was attempted in the office, but not completed due to stenosis.     OB/GYN History  No LMP recorded (lmp unknown). Patient is postmenopausal.    Social History     Substance and Sexual Activity   Sexual Activity Yes    Partners: Male    Birth control/protection: Post-menopausal       Sexually transmitted infections:no past history    OB History    Para Term  AB Living   0 0 0 0 0 0   SAB IAB Ectopic Multiple Live Births   0 0 0 0 0       The following portions of the chart were reviewed this encounter and updated as appropriate:    Tobacco  Allergies  Meds  Problems  Med Hx  Surg Hx  Fam Hx         Screenings  Social Determinants of Health     Tobacco Use: Medium Risk (2024)    Patient History     Smoking Tobacco Use: Former     Smokeless Tobacco Use: Never     Passive Exposure: Not on file   Alcohol Use: Not on file   Financial Resource Strain: Low Risk  (2024)    Received from [facility]    Overall Financial Resource Strain (CARDIA)     Difficulty of Paying Living Expenses: Not hard at all   Food Insecurity: No Food Insecurity (2024)    Received from [facility]    Hunger Vital Sign     Worried About Running Out of Food in the Last Year: Never true     Ran Out of Food in the Last Year: Never true   Transportation Needs: No Transportation Needs (2024)    Received from [facility]    PRAPARE - Transportation     Lack of Transportation (Medical): No     Lack of Transportation (Non-Medical): No   Physical Activity: Insufficiently Active (2024)    Received from [facility]    Exercise Vital Sign     Days of Exercise per Week: 3 days     Minutes of Exercise per Session: 40 min   Stress: No Stress Concern  Present (2/4/2024)    Received from [facility]    [employer] [employer] - Occupational Stress Questionnaire     Feeling of Stress : Not at all   Social Connections: Moderately Integrated (2/4/2024)    Received from [facility]    Social Connection and Isolation Panel [NHANES]     Frequency of Communication with Friends and Family: More than three times a week     Frequency of Social Gatherings with Friends and Family: More than three times a week     Attends Bahai Services: More than 4 times per year     Active Member of Clubs or Organizations: Yes     Attends Club or Organization Meetings: More than 4 times per year     Marital Status:    Intimate Partner Violence: Not At Risk (2/4/2024)    Received from [facility]    Humiliation, Afraid, Rape, and Kick questionnaire     Fear of Current or Ex-Partner: No     Emotionally Abused: No     Physically Abused: No     Sexually Abused: No   Depression: Not at risk (4/24/2024)    Received from [facility]    PHQ-2     PHQ-2 score: 0   Housing Stability: Low Risk  (2/4/2024)    Received from [facility]    Housing Stability Vital Sign     Unable to Pay for Housing in the Last Year: No     Number of Places Lived in the Last Year: 1     Unstable Housing in the Last Year: No   Utilities: Not on file   Digital Equity: Not on file   Health Literacy: Not on file         Review of Systems  Review of Systems   Constitutional:  Negative for chills and fever.   Eyes:  Negative for visual disturbance.   Respiratory:  Negative for cough and shortness of breath.    Cardiovascular:  Negative for chest pain and palpitations.   Gastrointestinal:  Negative for abdominal pain, constipation, diarrhea, nausea and vomiting.   Genitourinary:  Negative for dyspareunia, dysuria, frequency, hematuria, urgency, vaginal bleeding and vaginal discharge.   Neurological:  Negative for dizziness, weakness and headaches.        OBJECTIVE  Vitals:    05/08/24 0832   BP:  "130/80   Weight: 96.6 kg (213 lb)   Height: 1.676 m (5' 6\")     Body mass index is 34.38 kg/m².     Physical Exam  Constitutional:       Appearance: Normal appearance.   HENT:      Head: Normocephalic and atraumatic.      Nose: Nose normal.      Mouth/Throat:      Mouth: Mucous membranes are moist.   Eyes:      Extraocular Movements: Extraocular movements intact.      Pupils: Pupils are equal, round, and reactive to light.   Cardiovascular:      Rate and Rhythm: Normal rate.   Pulmonary:      Effort: Pulmonary effort is normal.   Musculoskeletal:         General: Normal range of motion.      Cervical back: Normal range of motion.   Neurological:      General: No focal deficit present.      Mental Status: She is alert and oriented to person, place, and time.   Skin:     General: Skin is warm and dry.   Psychiatric:         Mood and Affect: Mood normal.         Behavior: Behavior normal.   Vitals and nursing note reviewed.          Last Pap: approximate date 04/2024 and was normal    Immunization History   Administered Date(s) Administered    Flu vaccine (IIV4), preservative free *Check age/dose* 09/30/2021    Pfizer Purple Cap SARS-CoV-2 04/03/2021, 04/24/2021, 12/06/2021          ASSESSMENT & PLAN  Problem List Items Addressed This Visit          Ob-Gyn Problems    Postmenopausal bleeding    Overview     - On MHT since 2021  - Episode of PMB  - TVUS 03/2024 with 5 mm EMS  - Attempted EMB in office 04/2024, unable complete due to stenosis  - Plan for hysteroscopy D&C in the OR, reviewed possible IUD placement at the time of the procedure; will let me know if she is interested in this  - Procedure reviewed with patient, all questions answered            Follow up: For surgery    Kailyn Cabrera MD  Obstetrics & Gynecology  05/08/24   "

## 2024-05-17 SDOH — SOCIAL STABILITY: SOCIAL NETWORK: I HAVE TROUBLE DOING ALL OF MY REGULAR LEISURE ACTIVITIES WITH OTHERS: RARELY

## 2024-05-17 SDOH — SOCIAL STABILITY: SOCIAL NETWORK: I HAVE TROUBLE DOING ALL OF THE ACTIVITIES WITH FRIENDS THAT I WANT TO DO: RARELY

## 2024-05-17 SDOH — SOCIAL STABILITY: SOCIAL NETWORK: I HAVE TROUBLE DOING ALL OF THE FAMILY ACTIVITIES THAT I WANT TO DO: NEVER

## 2024-05-17 SDOH — SOCIAL STABILITY: SOCIAL NETWORK: PROMIS ABILITY TO PARTICIPATE IN SOCIAL ROLES & ACTIVITIES T-SCORE: 52

## 2024-05-17 SDOH — SOCIAL STABILITY: SOCIAL NETWORK: I HAVE TROUBLE DOING ALL OF MY USUAL WORK (INCLUDE WORK AT HOME): SOMETIMES

## 2024-05-17 SDOH — SOCIAL STABILITY: SOCIAL NETWORK

## 2024-05-20 ENCOUNTER — TELEPHONE (OUTPATIENT)
Dept: OBSTETRICS AND GYNECOLOGY | Facility: CLINIC | Age: 59
End: 2024-05-20
Payer: COMMERCIAL

## 2024-05-21 ENCOUNTER — TELEPHONE (OUTPATIENT)
Dept: OBSTETRICS AND GYNECOLOGY | Facility: CLINIC | Age: 59
End: 2024-05-21
Payer: COMMERCIAL

## 2024-05-21 NOTE — TELEPHONE ENCOUNTER
Patient call and given her surgery date of 6/18/24  @  [facility] . ( [address])

## 2024-05-22 ENCOUNTER — TELEPHONE (OUTPATIENT)
Dept: OBSTETRICS AND GYNECOLOGY | Facility: CLINIC | Age: 59
End: 2024-05-22

## 2024-05-22 ENCOUNTER — ALLIED HEALTH (OUTPATIENT)
Dept: INTEGRATIVE MEDICINE | Facility: CLINIC | Age: 59
End: 2024-05-22

## 2024-05-22 DIAGNOSIS — R41.89 BRAIN FOG: Primary | ICD-10-CM

## 2024-05-22 PROCEDURE — 97139 UNLISTED THERAPEUTIC PX: CPT | Performed by: ACUPUNCTURIST

## 2024-05-22 SDOH — ECONOMIC STABILITY: FOOD INSECURITY: WITHIN THE PAST 12 MONTHS, YOU WORRIED THAT YOUR FOOD WOULD RUN OUT BEFORE YOU GOT MONEY TO BUY MORE.: NEVER TRUE

## 2024-05-22 SDOH — ECONOMIC STABILITY: FOOD INSECURITY: WITHIN THE PAST 12 MONTHS, THE FOOD YOU BOUGHT JUST DIDN'T LAST AND YOU DIDN'T HAVE MONEY TO GET MORE.: NEVER TRUE

## 2024-05-22 ASSESSMENT — PROMIS GLOBAL HEALTH SCALE
RATE_MENTAL_HEALTH: GOOD
RATE_SOCIAL_SATISFACTION: GOOD
CARRYOUT_SOCIAL_ACTIVITIES: GOOD
RATE_GENERAL_HEALTH: GOOD
RATE_AVERAGE_FATIGUE: MILD
RATE_PHYSICAL_HEALTH: GOOD
CARRYOUT_PHYSICAL_ACTIVITIES: MOSTLY
RATE_QUALITY_OF_LIFE: GOOD
RATE_AVERAGE_PAIN: 3
EMOTIONAL_PROBLEMS: RARELY

## 2024-05-22 ASSESSMENT — ANXIETY QUESTIONNAIRES
PAST MONTH HOW OFTEN HAVE YOU FELT CONFIDENT ABOUT YOUR ABILITY TO HANDLE YOUR PROBLEMS: 2 - SOMETIMES
PAST MONTH HOW OFTEN HAVE YOU FELT THAT YOU WERE UNABLE TO CONTROL THE IMPORTANT THINGS IN YOUR LIFE: 2 - SOMETIMES
PAST MONTH HOW OFTEN HAVE YOU FELT DIFFICULTIES WERE PILING UP SO HIGH THAT YOU COULD NOT OVERCOME THEM: 2 - SOMETIMES
PAST MONTH HOW OFTEN HAVE YOU FELT THAT YOU WERE UNABLE TO CONTROL THE IMPORTANT THINGS IN YOUR LIFE: 2 - SOMETIMES
PAST MONTH HOW OFTEN HAVE YOU FELT CONFIDENT ABOUT YOUR ABILITY TO HANDLE YOUR PROBLEMS: 2 - SOMETIMES
PAST MONTH HOW OFTEN HAVE YOU FELT THAT THINGS WERE GOING YOUR WAY: 2 - SOMETIMES

## 2024-05-22 NOTE — PROGRESS NOTES
Acupuncture Visit:     Subjective   Patient ID: Kailyn Lopez is a 58 y.o. female who presents for No chief complaint on file.  Pt here for initial Naturopathic consult for brain fog.  Self pay  1    States brain fog most days at work. She works in cyber security.  Improved energy and sleep with steroid - most recent Rx steroid for sinus infx, also after botox (for migraines) injections she gets steroid for 5 days. States she does not get a headache after every botox. States she takes botox shots q 3 mos.  Notes going through menopause starting in 2020.  Taking HRT Rx estrogen and progesterone x 1 yr for hot flashes/anxiety/night sweats  States sx are significantly reduced.  She is taking Celexa for 10 yrs.   Notes when she takes steroids her brain fog is better.  Hives after tylenol,  H/o hashimoto's, LBP and urticaria (CIU)  States she is I remission for urticaria  States her allergist told her no estrogens and NSAIDS  She is taking oral drops for desensitized to estrogen (Dr Griffith)  Dr Solorio is her immunologist    States h/o migraines. Notes she grinds and clenches her teeth, neck involvement, dizzy, R eye pain  Nasal congestion  She is under the care of Dr Robles (neurologist)    Diet - states she has not worked with a practitioner before  States she thinks rich milk products can upset her stomach.  H/o martinez's esophagus,  Constipation - she has 1 bowel movement q 2-3 days.  2 cups coffee /day, drinks iced tea, water(^ in past 2 wks, 8-10 glasses/day), h/o not drinking enough water  States on and off weight watcher for years  States she thinks she has a sensitivities to fake sugars except stevia  She is incorporating more fruits  Notes she was inactive 2 yrs ago due to leg injury, non weight bearing. Initially gained some weight then lost some of it. HBA1C is increasing.    She is taking 500 1xq to help control HBA1C    Supp - Vit D 5000 IU/day x 2 yrs, last blood test 2 yrs ago      States all organs  intact.                    Review of Systems         Provider reviewed plan for the acupuncture session, precautions and contraindications. Patient/guardian/hospital staff has given consent to treat with full understanding of what to expect during the session. Before acupuncture began, provider explained to the patient to communicate at any time if the procedure was causing discomfort past their tolerance level. Patient agreed to advise acupuncturist. The acupuncturist counseled the patient on the risks of acupuncture treatment including pain, infection, bleeding, and no relief of pain. The patient was positioned comfortably. There was no evidence of infection at the site of needle insertions.    Objective   Physical Exam                   Acupuncture Evaluation / Recommendation(s) / Follow-up  Post-Treatment Recommendation: Discussed diet and lifestyle rec. 1- avoid dairy, wheat, coffee, sugar, processed foods. Future considerations B complex, omega 3, herbal adaptogens. Refer to [facility] for workup, FLU 4-6 weeks.         Assessment/Plan

## 2024-05-22 NOTE — TELEPHONE ENCOUNTER
Called patient. No answer. Left voicemail to return call   ----- Message from Angelia Hoff sent at 5/21/2024  2:43 PM EDT -----  Regarding: scheduled for surgery   Dr.Ellen Cabrera  6/18/24 @ [facility] . Hysteroscopy

## 2024-05-24 SDOH — SOCIAL STABILITY: SOCIAL NETWORK: PROMIS ABILITY TO PARTICIPATE IN SOCIAL ROLES & ACTIVITIES T-SCORE: 50

## 2024-05-24 SDOH — SOCIAL STABILITY: SOCIAL NETWORK: I HAVE TROUBLE DOING ALL OF MY REGULAR LEISURE ACTIVITIES WITH OTHERS: RARELY

## 2024-05-24 SDOH — SOCIAL STABILITY: SOCIAL NETWORK

## 2024-05-24 SDOH — SOCIAL STABILITY: SOCIAL NETWORK: I HAVE TROUBLE DOING ALL OF THE FAMILY ACTIVITIES THAT I WANT TO DO: RARELY

## 2024-05-24 SDOH — SOCIAL STABILITY: SOCIAL NETWORK: I HAVE TROUBLE DOING ALL OF MY USUAL WORK (INCLUDE WORK AT HOME): SOMETIMES

## 2024-05-24 SDOH — SOCIAL STABILITY: SOCIAL NETWORK: I HAVE TROUBLE DOING ALL OF THE ACTIVITIES WITH FRIENDS THAT I WANT TO DO: RARELY

## 2024-05-24 ASSESSMENT — PROMIS GLOBAL HEALTH SCALE
RATE_MENTAL_HEALTH: GOOD
RATE_QUALITY_OF_LIFE: GOOD
CARRYOUT_SOCIAL_ACTIVITIES: GOOD
RATE_SOCIAL_SATISFACTION: GOOD
RATE_AVERAGE_FATIGUE: MODERATE
CARRYOUT_PHYSICAL_ACTIVITIES: MOSTLY
RATE_GENERAL_HEALTH: GOOD
RATE_AVERAGE_PAIN: 3
RATE_PHYSICAL_HEALTH: GOOD
EMOTIONAL_PROBLEMS: RARELY

## 2024-05-30 ENCOUNTER — ALLIED HEALTH (OUTPATIENT)
Dept: INTEGRATIVE MEDICINE | Facility: CLINIC | Age: 59
End: 2024-05-30
Payer: COMMERCIAL

## 2024-05-30 DIAGNOSIS — R41.89 BRAIN FOG: ICD-10-CM

## 2024-05-30 DIAGNOSIS — R53.83 OTHER FATIGUE: Primary | ICD-10-CM

## 2024-05-30 PROCEDURE — 99417 PROLNG OP E/M EACH 15 MIN: CPT | Performed by: NURSE PRACTITIONER

## 2024-05-30 PROCEDURE — 99215 OFFICE O/P EST HI 40 MIN: CPT | Performed by: NURSE PRACTITIONER

## 2024-05-30 SDOH — ECONOMIC STABILITY: FOOD INSECURITY: WITHIN THE PAST 12 MONTHS, THE FOOD YOU BOUGHT JUST DIDN'T LAST AND YOU DIDN'T HAVE MONEY TO GET MORE.: NEVER TRUE

## 2024-05-30 SDOH — ECONOMIC STABILITY: FOOD INSECURITY: WITHIN THE PAST 12 MONTHS, YOU WORRIED THAT YOUR FOOD WOULD RUN OUT BEFORE YOU GOT MONEY TO BUY MORE.: NEVER TRUE

## 2024-05-30 ASSESSMENT — ANXIETY QUESTIONNAIRES
PAST MONTH HOW OFTEN HAVE YOU FELT CONFIDENT ABOUT YOUR ABILITY TO HANDLE YOUR PROBLEMS: 2 - SOMETIMES
PAST MONTH HOW OFTEN HAVE YOU FELT CONFIDENT ABOUT YOUR ABILITY TO HANDLE YOUR PROBLEMS: 2 - SOMETIMES
PAST MONTH HOW OFTEN HAVE YOU FELT THAT THINGS WERE GOING YOUR WAY: 2 - SOMETIMES
PAST MONTH HOW OFTEN HAVE YOU FELT THAT YOU WERE UNABLE TO CONTROL THE IMPORTANT THINGS IN YOUR LIFE: 1 - ALMOST NEVER
PAST MONTH HOW OFTEN HAVE YOU FELT DIFFICULTIES WERE PILING UP SO HIGH THAT YOU COULD NOT OVERCOME THEM: 0 - NEVER
PAST MONTH HOW OFTEN HAVE YOU FELT THAT YOU WERE UNABLE TO CONTROL THE IMPORTANT THINGS IN YOUR LIFE: 1 - ALMOST NEVER

## 2024-05-30 NOTE — PATIENT INSTRUCTIONS
Plan:   See sleep hygiene and see if you can make any changes to your current schedule to create the best environment for sleep.   Start practicing intentional deep breathing methods like 4-7-8 breathing method at least 4 times daily to help regulate cortisol levels. See attachment for reference   Consider practicing guided meditation ten minutes a day to add to benefits. Recommended apps: Calm, Headspace, Insight timer, fitmind.

## 2024-05-30 NOTE — PROGRESS NOTES
Kailyn  presents for a new patient visit.     Today we reviewed pillars of Lifestyle Medicine: Sleep restoration, Nutrition, Stress Management, Interpersonal and Social Connection, Avoidance of Risky Behavior. The benefits were discussed for each pillar and how incorporation of changes in lifestyle would benefit the patient and his/her lifestyle choices.     Work: [employer] .  Lives with: Personal connection level:   Started with Dr. Robles, neurology with migraines/. Botox/steroids. When taking steroids she feels sharper.     Somatic complaints:   Admits brain fog/mental clarity issues  Aches/pain  Low energy    Goal of the visit: To help with symptoms     Sleep hygiene:  Wakes up 7 am. Usually ends up falling asleep watching TV 7-1030.  Start grounding every morning- expose your face to some sort of light at sunrise, put barefeet into the ground (earth, dirt) for one minute.   White brown pink noise machine.     Stress management: (Identifiable stressors)   Are you currently using any stress management tools/resources     Supplements:   We discussed bioidentical hormone replacement and how symptoms correlate with low estrogen.   Fullscript for Liquid D  Multi-    Plan:   Plan:   See sleep hygiene and see if you can make any changes to your current schedule to create the best environment for sleep.   Start practicing intentional deep breathing methods like 4-7-8 breathing method at least 4 times daily to help regulate cortisol levels. See attachment for reference   Consider practicing guided meditation ten minutes a day to add to benefits. Recommended apps: Calm, Headspace, Insight timer, fitmind.     No follow-ups on file.     [email or web address]

## 2024-05-31 RX ORDER — ACETAMINOPHEN 325 MG/1
TABLET ORAL EVERY 6 HOURS PRN
COMMUNITY

## 2024-05-31 RX ORDER — FEXOFENADINE HCL 60 MG
TABLET ORAL DAILY
COMMUNITY

## 2024-05-31 ASSESSMENT — ENCOUNTER SYMPTOMS: HEADACHES: 1

## 2024-05-31 NOTE — CPM/PAT H&P
CPM/PAT Evaluation       Name: Kailyn Lopez (Kailyn Lopez)  /Age: 1965/58 y.o.     TELEMEDICINE ENCOUNTER  Patient was contacted by telephone for preadmission testing perioperative risk assessment prior to surgery.    CHIEF COMPLAINT  Endometrial thickening on ultrasound    HPI  Kailyn Lopez is a 58-year-old female on hormone therapy who had an episode of postmenopausal bleeding.  Ultrasound done on 2024 showed multiple fibroid uterus with fibroids measuring 1.2 cm, and 1.4 cm.  Also, mildly abnormal thickened endometrium of 5 mm.  Endometrial biopsy was attempted in clinic setting but unable to collect sample due to stenotic cervix.  Patient is now scheduled for hysteroscopy D&C on 2024    ACTIVE PROBLEMS  Patient Active Problem List   Diagnosis    Allergic rhinitis    Angioedema    Anxiety    Arthralgia of left elbow    Asymmetric SNHL (sensorineural hearing loss)    Hui's esophagus without dysplasia    Benign paroxysmal positional vertigo of left ear    Chronic rhinitis    Chronic migraine without aura, with intractable migraine, so stated, with status migrainosus    Deviated septum    Dizziness    Ear popping    Ear pressure, bilateral    Facial pressure    Facial pain    Hot flashes, menopausal    GERD (gastroesophageal reflux disease)    Habitual snoring    Hypothyroidism    Insomnia    Lateral epicondylitis of left elbow    Mechanical low back pain    Medial epicondylitis of left elbow    Nasal congestion with rhinorrhea    Migraine    Nasal drainage    Class 2 obesity with body mass index (BMI) of 38.0 to 38.9 in adult    Neck pain on left side    Obstructive sleep apnea (adult) (pediatric)    Oromandibular dystonia    Perimenopausal    Postnasal drip    Segmental and somatic dysfunction of lumbar region    TMJ pain dysfunction syndrome    Urticaria, idiopathic    Vitamin D deficiency    Vertigo    Insulin resistance    Prediabetes    Right hip pain    Sacroiliac inflammation (CMS-HCC)     Trochanteric bursitis of right hip    Fibromyalgia    Myofascial pain    Cervical spondylosis    Difficulty breathing    Nasal valve collapse    Hypertrophy of nasal turbinates    Intermittent asthma without complication, unspecified asthma severity (HHS-HCC)    Achilles tendinosis of right lower extremity    Hypothyroidism due to Hashimoto's thyroiditis    Hot flashes    History of nutritional disorder    History of adenomatous polyp of colon    Depression    Irritable bowel syndrome with predominant constipation    Bloating    Arthritis of right subtalar joint    Ankle pain    KRISH positive    Postmenopausal bleeding     PAST MEDICAL HISTORY  Past Medical History:   Diagnosis Date    Adjustment insomnia 05/07/2021    Adjustment insomnia    Anxiety 1986    Benign paroxysmal vertigo, unspecified ear 03/29/2021    Vertigo, benign positional    Epigastric pain 07/19/2021    Dyspepsia    Fibromyalgia, primary 2023    Headache 1990    Headache, tension-type 1990    Menopausal and female climacteric states 07/30/2021    Perimenopausal    Migraine 2015    Other specified postprocedural states     History of Papanicolaou smear    Personal history of other diseases of the musculoskeletal system and connective tissue 03/31/2021    History of neck pain    Personal history of other medical treatment 05/28/2020    History of screening mammography    Personal history of other medical treatment     History of mammogram    Personal history of other specified conditions 06/03/2021    History of headache    Rash and other nonspecific skin eruption 07/31/2015    Rash    Sleep apnea 2020     SURGICAL HISTORY  Past Surgical History:   Procedure Laterality Date    OTHER SURGICAL HISTORY  09/06/2020    Esophagogastroduodenoscopy    OTHER SURGICAL HISTORY  05/26/2020    Nasal septoplasty    OTHER SURGICAL HISTORY  09/06/2020    Colonoscopy    OTHER SURGICAL HISTORY Right     peroneal tendon repair     ANESTHESIA HISTORY  Denies problems  with anesthesia in the past such as PONV, prolonged sedation, awareness, dental damage, aspiration, cardiac arrest, difficult intubation, or unexpected hospital admissions.  Denies family history of malignant hyperthermia, or pseudocholinesterase deficiency.    SOCIAL HISTORY  Former cigarette smoker quit 30 years ago; EtOH: About 2 glasses of wine a month; denies recreational drug use.  Patient does not engage in regular exercise, but states in good weather she will go for a walk 2-3 times a week.  She states she is able to do moderate ADLs such as washing floors, vacuuming, carrying filled laundry baskets up and down stairs, light gardening such as planting flowers.  She denies chest pain, or DESOUZA.  METS 4    FAMILY HISTORY  Family History   Problem Relation Name Age of Onset    Arthritis Mother Courtney     Migraines Mother Courtney     Allergies Father Cassius     Coronary artery disease Father Cassius     Depression Father Cassius     Allergies Brother Kel     Migraines Brother Kel     Depression Brother Kel     Arthritis Maternal Grandmother Kathy     Heart failure Maternal Grandmother Kathy     Migraines Maternal Grandmother Kathy     Stroke Maternal Grandmother Kathy     Arthritis Paternal Grandmother      Coronary artery disease Paternal Grandfather       ALLERGIES  Allergies   Allergen Reactions    Nsaids (Non-Steroidal Anti-Inflammatory Drug) Swelling    Ace Inhibitors Unknown    Animal Dander Unknown    Aspirin Hives    Cat Dander Unknown    Drospirenone-E.Estradiol-Lm.Fa Other    Estrogens Unknown    Grass Pollen Other    Nabumetone Unknown    Other Unknown     Pollen, ragweet, dust mites    Ragweed Unknown    Tree And Shrub Pollen Unknown     MEDICATIONS  No current facility-administered medications for this encounter.    Current Outpatient Medications:     albuterol 90 mcg/actuation inhaler, Inhale 1-2 puffs every 4 hours if needed for wheezing. EVERY 4 TO 6 HOURS AS NEEDED., Disp: 18 g, Rfl: 3     cholecalciferol (Vitamin D-3) 125 MCG (5000 UT) capsule, Take 1 capsule (125 mcg) by mouth once daily., Disp: , Rfl:     citalopram (CeleXA) 10 mg tablet, TAKE 1 TABLET BY MOUTH EVERY DAY, Disp: 90 tablet, Rfl: 1    doxycycline (Oracea) 40 mg DR capsule, Take 1 capsule (40 mg) by mouth once daily in the morning. Do not crush or chew. Take with a full glass of water and do not lie down for at least 30 minutes after., Disp: , Rfl:     estradiol (Estrace) 1 mg tablet, Take 1 tablet (1 mg) by mouth once daily., Disp: , Rfl:     famotidine (Pepcid) 20 mg tablet, Take 1 tablet (20 mg) by mouth once daily., Disp: , Rfl:     levothyroxine (Synthroid, Levoxyl) 100 mcg tablet, Take 1 tablet (100 mcg) by mouth once daily., Disp: , Rfl:     liothyronine (Cytomel) 5 mcg tablet, Take 0.5 tablets (2.5 mcg) by mouth once daily., Disp: , Rfl:     metFORMIN  mg 24 hr tablet, TAKE 1 TABLET (500 MG) BY MOUTH ONCE DAILY IN THE EVENING. TAKE WITH MEALS. DO NOT CRUSH, CHEW, OR SPLIT., Disp: 90 tablet, Rfl: 1    mometasone furoate, bulk, 100 % powder, Compound 1 mg capsule to be added to sinus rinse twice daily., Disp: 180 g, Rfl: 3    olopatadine (Patanase) 0.6 % spray,non-aerosol nasal spray, SPRAY 2 SPRAYS INTO AFFECTED NOSTRIL(S) TWICE A DAY, Disp: 91.5 g, Rfl: 1    omeprazole (PriLOSEC) 40 mg DR capsule, Take 1 capsule (40 mg) by mouth once daily as needed. IN THE MORNING. 60 min BEFORE a MEAL, Disp: , Rfl:     progesterone (Prometrium) 100 mg capsule, Take 1 capsule (100 mg) by mouth once daily at bedtime., Disp: , Rfl:     SUMAtriptan (Imitrex) 50 mg tablet, Take 1 tablet (50 mg) by mouth if needed for migraine for up to 1 dose. FOR MIGRAINE RELIEF. MAY REPEAT EVERY 2 HOURS MAXIMUM  MG PER DAY, Disp: 9 tablet, Rfl: 11    ubrogepant (Ubrelvy) 100 mg tablet tablet, Take 1 tablet (100 mg) by mouth if needed (At onset of migraine.  Okay to repeat in 2 hours if needed.)., Disp: 16 tablet, Rfl: 3    acetaminophen (Tylenol) 325  mg tablet, Take by mouth every 6 hours if needed for mild pain (1 - 3)., Disp: , Rfl:     busPIRone (Buspar) 5 mg tablet, Take 2 tablets (10 mg) by mouth once daily as needed (anxiety). (Patient not taking: Reported on 5/30/2024), Disp: 60 tablet, Rfl: 2    carbinoxamine maleate 4 mg tablet, Take by mouth., Disp: , Rfl:     citalopram (CeleXA) 20 mg tablet, Take 1 tablet (20 mg) by mouth once daily., Disp: 90 tablet, Rfl: 1    fexofenadine (Allegra) 60 mg tablet, Take by mouth once daily., Disp: , Rfl:     semaglutide (Ozempic) 0.25 mg or 0.5 mg(2 mg/1.5 mL) pen injector, Inject 0.5mg weekly (Patient taking differently: Inject 0.25 mg under the skin 1 (one) time per week. Inject 0.5mg weekly), Disp: 3 mL, Rfl: 2    Review of Systems   Eyes:         Wears prescription eyeglasses.   Respiratory:          Mild obstructive sleep apnea CPAP was trialed but not well-tolerated.  Weight loss was suggested.    Seasonal allergies with use of albuterol 2-3 times a year   Genitourinary:         Postmenopausal bleeding; mildly abnormal thickened endometrium on ultrasound.   Skin:         Rosacea; chronic idiopathic urticaria.   Neurological:  Positive for headaches (Migraines).   All other systems reviewed and are negative.    PHYSICAL EXAM  Deferred    AIRWAY EXAM  Deferred    VITALS  No vitals taken for telemedicine visit  Height: 5 feet 6 inches; weight: 213 pounds; BMI: 34.38  BP Readings from Last 4 Encounters:   05/08/24 122/82   05/08/24 130/80   04/15/24 124/84   04/03/24 124/78     LABS  Lab Results   Component Value Date    WBC 8.9 09/14/2023    HGB 13.5 09/14/2023    HCT 41.9 09/14/2023    MCV 88 09/14/2023     09/14/2023     Lab Results   Component Value Date    GLUCOSE 107 (H) 09/20/2023    CALCIUM 10.1 09/20/2023     09/20/2023    K 4.9 09/20/2023    CO2 29 09/20/2023     09/20/2023    BUN 14 09/20/2023    CREATININE 0.81 09/20/2023     Lab orders placed by Dr. Hammonds for CBC, type and screen  on 05/08/2024.  Patient expressed understanding that lab work was to be completed within 3 days of surgery.    IMAGING  EKG from 09/20/2023  Indications  Priority: Routine  Not on file     Interpretation Summary    Normal sinus rhythm  Possible Left atrial enlargement  Borderline ECG  No previous ECGs available  Confirmed by Norma Bernard ([id number]) on 9/24/2023 9:18:25 PM      ASSESSMENT/PLAN  Postmenopausal bleeding; mildly abnormal endometrial thickening on ultrasound  Diagnostic hysteroscopy D&C      This note was created in part upon personal review of patient's medical records.  Speech recognition transcription software was used in the creation of this note. Despite proofreading, several typographical errors might be present that might affect the meaning of the content.

## 2024-05-31 NOTE — PREPROCEDURE INSTRUCTIONS
Pre-Op Instructions & Checklist   Your surgery has been scheduled at [facility] at [address] [place], [address], [address], in the [facility]. Parking is to the left of the main entrance.  You will be contacted about the time of your surgery the day before your surgery. If you are unable to answer the phone, a detailed voicemail message will be left. Make sure that your voicemail box is not full so a message can be left. If you have not received a call by 3:00 pm you may call [phone] between the hours of 3:00 and 4:00 pm. Please be available by phone the night before/day of surgery in case there is a change in the schedule which may require you to arrive earlier/later.    14 DAYS BEFORE SURGERY STOP TAKING WEIGHT LOSS MEDICATIONS      7 DAYS BEFORE SURGERY STOP THESE MEDICATIONS:  Multiple Vitamins containing Vitamin E  Herbal supplements, Fish Oil, garlic pills, turmeric, CoQ enzyme  Stop taking aspirin, and aspirin-containing products as well as NSAID's such as Advil, Motrin, Aleve, Ibuprofen. Tylenol is okay to take for pain relief.   If you are currently taking Coumadin/Warfarin, we will have to coordinate that with your PCP &/or the Anticoagulation Clinic.    THE DAY BEFORE SURGERY:  Do not eat any food after midnight the night before surgery.   You are permitted to have clear liquids such as water, apple juice, plain tea or coffee (no milk or creamer), clear electrolyte-replenishing drinks such as Pedialyte, Gatorade, or Powerade (not yogurt or pulp-containing smoothies or juices such as orange juice) up to 2 hours before your surgery.    DAY OF SURGERY, TAKE THESE MEDICATIONS with a small sip of water (if it is not listed, do not take it):    Take: Levothyroxine (Synthroid); liothyronine (Cytomel); doxycycline                ON THE MORNING OF SURGERY:  *Shower either the night before your surgery or the morning of your surgery  *Do not use moisturizers, creams,  lotions or perfume, or make-up.  *Wear comfortable, loose fitting clothing.   *All jewelry and valuables should be left at home.  *Prosthetic devices such as contact lenses, hearing aids, dentures, eyelash extensions, hairpins and body piercings must be removed before surgery. Bring containers for eyeglasses/contacts, dentures, or hearing aids with you.  Diabetics: Please check fasting blood sugars upon waking up.  If fasting blood sugars are <80ml/dl, please drink 100ml/3oz. of apple juice no later than 2 hours prior to surgery.      BRING WITH YOU:   *Photo ID and insurance card  *Current list of medicines and allergies  *Pacemaker/Defibrillator/Heart stent cards  *Copy of your complete Advanced Directive/DHPOA-if applicable     SMOKING:  *Quitting smoking can make a huge difference to your health and recovery from surgery.    *If you need help with quitting, call 6-150-QUIT-NOW.  Alcohol:  *No alcoholic beverages for 48 hours before surgery.     AFTER OUTPATIENT SURGERY:  *A responsible adult MUST accompany you at the time of discharge and stay with you for 24 hours after your surgery.  *You may NOT drive yourself home after surgery.  *You may use a taxi or ride sharing service ([employer], Uber) to return home ONLY if you are to change the date accompanied by a friend or family member.  *Instructions for resuming your medications will be provided by your surgeon.     CONTACT SURGEON'S OFFICE IF YOU DEVELOP:  * Fever =/> 100.4 F   * New respiratory symptoms (e.g. cough, shortness of breath, respiratory distress, sore throat)  * Recent loss of taste or smell  *Flu like symptoms such as headache, fatigue or gastrointestinal symptoms  * If you develop any open sores, shingles, burning or painful urination   AND/OR:  * You no longer wish to have the surgery.  * Any other personal circumstances change that may lead to the need to cancel or defer this surgery.  *You were admitted to any hospital within one week of your  planned procedure.     If you have any questions regarding these preoperative instructions you may call [phone]. If you have questions regarding you surgical procedure, or post-operative care/recovery please call your surgeon's office

## 2024-06-06 ENCOUNTER — PREP FOR PROCEDURE (OUTPATIENT)
Dept: OTOLARYNGOLOGY | Facility: CLINIC | Age: 59
End: 2024-06-06
Payer: COMMERCIAL

## 2024-06-06 ENCOUNTER — HOSPITAL ENCOUNTER (OUTPATIENT)
Facility: CLINIC | Age: 59
Setting detail: OUTPATIENT SURGERY
End: 2024-06-06
Attending: OTOLARYNGOLOGY | Admitting: OTOLARYNGOLOGY
Payer: COMMERCIAL

## 2024-06-06 DIAGNOSIS — J34.3 HYPERTROPHY OF NASAL TURBINATES: ICD-10-CM

## 2024-06-06 DIAGNOSIS — J34.2 DEVIATED NASAL SEPTUM: ICD-10-CM

## 2024-06-06 DIAGNOSIS — M95.0 ACQUIRED DEFORMITY OF NOSE: ICD-10-CM

## 2024-06-07 NOTE — PATIENT INSTRUCTIONS
-Ice on and off for the next 24 hours if injection sites are sore. Do gentle range of motion exercises in each area that was injected. Try to do them every hour for about half a minute or so, in every direction that the affected part goes. No pool, bath, or hot tub today. Avoid heavy lifting for the next 2 days.    -Start gabapentin according to up titration instructions  -Consider other medications in the future including Lyrica, Topamax, other muscle relaxants, nortriptyline etc.  -Physical therapy referral provided last time, for active strengthening exercises, THIS IS THE MOST IMPORTANT  -Follow-up with pain management physician about exactly what kind of injections he had done and other options including RFA or XI  -Consider bursa injections  - consider cervical spine MRI  -Consider Botox injections to the other muscles, to be timed within the same week as with Dr. Robles  - follow-up 2-3 months

## 2024-06-07 NOTE — PROGRESS NOTES
This is a pleasant 58-year-old right-handed woman with past medical history significant for anxiety, Hui's esophagus, BPPV, migraine, GERD, insomnia, hypothyroidism, IBS, RAMESH, who presents for follow-up of diffuse chronic pain.    She was last seen here on 4/15/2024, at which point I did bilateral head and neck, right lower back trigger point injections.  Previously it helped 90% for about 2 weeks at this time it helped again 90% about a month this time.     I advised her to consider medications.    I advised her to proceed with physical therapy and do daily home exercises. She did npt schedule the PT yet. I reminded her the importance of this again.    She had another round of botox for migraines, and Dr. Robles included SCM and masseter as well and this helped.     She's feeling pain in her R foot, between toes 1 and 2. She had an MRI done [facility] a couple of months ago, saying there was some swelling in the medial ankle . I do not have the images or report. A podiatrist did some sort of PRP, prolotherapy injections. Didn't help.  She is interested in trying gabapentin      She rates her pain as 3-4/10, previously 4/10    Otherwise, there have been no changes to her medications or past medical history since the last visit    ______________________________________  3/6/2024: Right head and neck, upper back and lower back trigger point injections, consider other medications and procedures in the future, proceed with physical therapy, follow-up with pain management physician, consider cervical spine MRI  4/15/2024: Bilateral head and neck, right lower back trigger point injections, continue physical therapy, home exercises, consider Botox to the neck muscles as well  6/10/2024: Bilateral head and neck, right lower back trigger point injections, proceed with physical therapy, home exercises, consider Botox to the muscles above, start gabapentin  _______________________________________  As a reminder:    TIMELINE OF  "COMPLAINT(S):    She feels that all of her issues are tied together including the fact that she was diagnosed with Hashimoto's disease in 1994, chronic idiopathic urticaria, menopause in the past few years, etc., leading to diffuse chronic pain beginning about 3 years ago.  There was no inciting or traumatic event, but the pain started about 3 years ago she has multiple complaints:.     -Sensitive to touch everywhere in the past 3 years, increased aches and pains, may be associated with a bout of BPPV in 2020/2021, which took a while to recover from  -Increase grinding, jaw and head pain even with her bike ride  -Bilateral neck and elbow pain  -Weight continue rupture in June 2022 without a traumatic event, status post surgical repair, increase sedentary lifestyle for 4 months after this, and some medial forefoot and medial lower leg nerve pain since then.    -Low back and hip pain also   -Hard to sleep on both sides due to bursitis pain    Most bothersome is the upper back, jaw and forehead pain.      She is try to lose weight, about 30 pounds in the past 8 months according to the patient.      She underwent a minimally invasive tenotomy (tenjet) for her medial epicondylitis of left elbow at [facility] on 3/15/2024,  \"This is coming along\"    She underwent prolotherapy for the right foot at [facility],. 3/20/24. [place] good temporarily with the lidocaine, but no difference after that.    Pain:  LOCATION- Headaches, jaw, neck, upper back and bilateral arm, medial RLE  RADIATION-  ASSOCIATED WITH-None  NUMBNESS/TINGLING- R medial foot and lower leg  WEAKNESS- No  CONSTANT or INTERMITTENT- Constant  SEVERITY/QUANTITY- 5  QUALITY- Achy  EXACERBATED BY- Over doing it  BETTER WITH- Relax, ice  TRIED-       Anti-Inflammatories: (Allergy to NSAIDs), recently Celebrex prescribed  but hasn't started it yet, drug challenge scheduled for 11/17. previously Medrol Dosepak didn't help      Muscle relaxants: Flexeril helps a little but causes " grogginess, previously Robaxin helps better than flexeril, tizanidine this causes nightmares      Anti-depressants:      Neuroleptics: Previously gabapentin prescribed but she never took it      LDN:    PHYSICAL THERAPY: Spring of 2021, PT again 1 year ago after peroneal rupture surgery. Minimal upper body work. Helped ROM and pain a little. No HEP.   TENS unit: No  CHIROPRACTIC MANIPULATION: No  ACUPUNCTURE TREATMENTS: No  DEEP TISSUE MASSAGE THERAPY: Yes  OSTEOPATHIC MANIPULATION THERAPY: No  INJECTIONS: Elbow injections  EMG/NCS:    -[facility] 8/25/2023 report shows mild chronic motor axon loss changes in the right flexor digitorum longus muscle and isolation which is insufficient for a diagnosis.  No evidence of large fiber sensorimotor polyneuropathy in the lower extremities, no evidence of right L3, L4-S1 motor radiculopathy.  There is presence of chronic neurogenic motor unit potential changes limited to the intrinsic foot muscles which are of unclear significance and may be related to local foot trauma secondary to shoe wear.  -She had another EMG done 1/4/24 Dr Mckeon: right mild L4-5 and L5-S1 radic. She also had a lumabr MRI done at [facility]SH, report an images not available, reportedly mild narrowing at L4-5, L5-S1 bilaterally  -She had another EMG at [facility] on 3/18/2024:   IMPRESSION:   This is an abnormal study.  There is electrophysiologic evidence most consistent with a chronic right L5 radiculopathy without observed evidence of active axonal loss.    Given the relatively intact nerve conduction studies, there is no evidence for a large fiber peripheral polyneuropathy, peroneal entrapment neuropathy, or sciatic mononeuropathy in the right lower extremity.    IMAGING: Yes, personally reviewed and interpreted    === 07/28/21 ===    MR BRAIN W AND WO CONTRAST    - Impression -  No acute intracranial abnormality was identified      == 03/02/23 ===    XR SHOULDER 2+ VIEWS RIGHT    - Impression -  Negative  exam.    Cervical spine xray 1/19/23:  Straining of the normal cervical lordosis.  There are severe degenerative   disc disease C5-C6 and moderate degenerative disc disease C6-C7,   progressed from 12/07/2009.  There is left-sided facet arthropathy at   C3-C4 and C4-C5.  There is no prevertebral soft tissue swelling.     Right foot MRI 3/29/2024: Mild osteoarthritis of the first metatarsal phalangeal joint, no evidence of bony contusion fracture or AVN, mild edema of the intrinsic muscle of the foot in the first intermetatarsal space    Right ankle MRI 3/29/2024: No synovitis in the tibialis posterior, flexor digitorum longus, flexor hallucis longus, peroneus longus and brevis tendons.  Sprain of the deltoid ligamentous complexes without evidence of tear, no contusion fracture or AVN    Lumbar MRI 12/28/2023:  Left foraminal herniation and facet hypertrophy at L4-5, moderate left and mild right foraminal stenosis, grade 1 with listhesis bulging disc with right paracentral herniation and annular tear and facet hypertrophy at L5-S1, right S1 and probable right L5 nerve root impingement    Lumbar x-ray 12/21/2023: Multilevel disc disease and facet arthrosis L2-3, L5-S1, osteophyte formation L3-4 and L4-5.        Lab Results   Component Value Date    HGBA1C 5.7 08/02/2023       FUNCTIONAL HISTORY: The patient is independent in all ADLs, mobility, and driving. The patient does not use any assistive device.    SH:  Lives in: [place]  Lives with: Self  Occupation: IT  Tobacco: No  Alcohol: No  Drugs: No    _________________________________  ROS: The patient denies any bowel or bladder incontinence/accidents, night sweats, fevers, chills, recent significant weight loss. A 14 point review of systems was reviewed with the patient and is as above and otherwise negative.  ROS questionnaire positive for fatigue, headache, constipation, muscle pain/tightness, back pain       Physical examination:    GEN - Alert, well-developed,  well-nourished, no acute distress  PSYCH - Cooperative, appropriate mood and affect  HEENT - NC/AT  RESP - Non-labored respirations, equal expansion  CV - warm and well-perfused, No cyanosis or edema in extremities.  ABD- soft, ND, overweight  SKIN - No rash.    Previous:  There was significant tenderness essentially in every area palpated in the upper back, periscapular muscles, lumbar paraspinal, gluteal muscles, thighs, arms, elbows, knees and shins, ankles    NEURO -   UE strength 5/5 -  including shoulder abduction, biceps, triceps, wrist extensors, finger flexors, interossei, and    LE strength 5/5 -  including hip flexors, knee flexors, knee extensors, ankle dorsiflexors, ankle plantar flexors, and EHL   Sensation - intact to light touch in bilateral upper and lower extremities except diminished light touch in the right DP space and right medial ankle compared to left  Reflexes - 2+ biceps, brachioradialis, triceps, patellar and Achilles reflexes bilaterally No Clonus, No Babinski, Sparrow's negative bilaterally  GAIT - Normal base, normal stride length, non-antalgic. Able to toe walk with some difficulty due to right ankle pain and she was able to heel walk without difficulty.  Very stiff neck and back    PROCEDURE:    Lidocaine 1%- 10 cc's used, 0 cc's wasted  200mg/20mL (10mg/mL)  NDC 7330-1349-07  LOT [id number]  EXP 02/01/2025  Manuf: Hospira    Cervical and Thoracic trigger point injections:    Lumbar and Gluteal trigger point injections:    Description of the Procedure: The procedure, risks and alternative treatments were discussed with the patient. After written informed consent was obtained, the trigger points in the trapezius, levator scapula, scalene, masseter, rhomboid, IS, lumbar paraspinal,  gluteal muscles were palpated and marked. The skin was prepped three times with alcohol. Using a 27 gauge 1.5 inch needle, after negative aspiration, the trigger points in each muscle were injected with a  total of 10 cc's of 1% lidocaine, spread equally into 14 sites. Twitch responses were observed in the musculature. The patient tolerated the procedure well with no immediate complications or bleeding.      Plan:   1. The patient was instructed in post-procedural care.  2. The patient was asked to apply moist heat and or ice for the next 24 hours and to perform daily gentle stretching exercises.     Physical Exam  Constitutional:        HENT:      Head:          IMPRESSION:    This  is a pleasant 58-year-old right-handed woman with past medical history significant for anxiety, Hui's esophagus, BPPV, migraine, GERD, insomnia, hypothyroidism, IBS, RAMESH, who presents for follow-up of diffuse chronic pain.  Symptoms and physical exam findings in this complex patient are likely multifactorial in nature and due to combination of multilevel cervical and lumbar spondylosis, possible radiculopathy component, reactive myofascial pain/tension, possibly tibial neuropathy, fibromyalgia, exacerbated by sedentary lifestyle.    -Bilateral head and neck, upper back, right lower back trigger point injections performed as above.  There were no complications and she tolerated the procedure well.  She was provided with postprocedure instructions.  -Start gabapentin according to up titration instructions. The medication mechanism, side effects as well as the risks, benefits, and alternatives were discussed. Goals of therapy discussed. The patient expressed understanding of this.    -Consider other medications in the future including Lyrica, Topamax, other muscle relaxants, nortriptyline etc.  -Physical therapy referral provided last time, for active strengthening exercises, THIS IS THE MOST IMPORTANT  -Follow-up with pain management physician about exactly what kind of injections he had done and other options including RFA or XI  -Consider bursa injections  - consider cervical spine MRI  -Consider Botox injections to the other  muscles, to be timed within the same week as with Dr. Robles  - follow-up 2-3 months        The patient expressed understanding and agreement with the assessment and plan. Patient encouraged to contact us should they have any questions, concerns, or any changes in symptoms.     Thank you for allowing me to participate in the care of your patient.      ** Dictated with voice recognition software, please forgive any errors in grammar and/or spelling **

## 2024-06-10 ENCOUNTER — OFFICE VISIT (OUTPATIENT)
Dept: PHYSICAL MEDICINE AND REHAB | Facility: CLINIC | Age: 59
End: 2024-06-10
Payer: COMMERCIAL

## 2024-06-10 VITALS
TEMPERATURE: 97.2 F | SYSTOLIC BLOOD PRESSURE: 119 MMHG | WEIGHT: 212 LBS | DIASTOLIC BLOOD PRESSURE: 83 MMHG | HEART RATE: 79 BPM | BODY MASS INDEX: 34.22 KG/M2

## 2024-06-10 DIAGNOSIS — M70.61 TROCHANTERIC BURSITIS OF RIGHT HIP: ICD-10-CM

## 2024-06-10 DIAGNOSIS — M79.18 MYOFASCIAL PAIN: ICD-10-CM

## 2024-06-10 DIAGNOSIS — M54.59 MECHANICAL LOW BACK PAIN: ICD-10-CM

## 2024-06-10 DIAGNOSIS — M47.812 CERVICAL SPONDYLOSIS: ICD-10-CM

## 2024-06-10 DIAGNOSIS — M46.1 SACROILIAC INFLAMMATION (CMS-HCC): ICD-10-CM

## 2024-06-10 DIAGNOSIS — M54.16 LUMBAR RADICULOPATHY: Primary | ICD-10-CM

## 2024-06-10 DIAGNOSIS — M79.7 FIBROMYALGIA: ICD-10-CM

## 2024-06-10 PROCEDURE — 20553 NJX 1/MLT TRIGGER POINTS 3/>: CPT | Performed by: PHYSICAL MEDICINE & REHABILITATION

## 2024-06-10 PROCEDURE — 3008F BODY MASS INDEX DOCD: CPT | Performed by: PHYSICAL MEDICINE & REHABILITATION

## 2024-06-10 PROCEDURE — 99214 OFFICE O/P EST MOD 30 MIN: CPT | Performed by: PHYSICAL MEDICINE & REHABILITATION

## 2024-06-10 RX ORDER — GABAPENTIN 100 MG/1
CAPSULE ORAL 3 TIMES DAILY
Qty: 333 CAPSULE | Refills: 0 | Status: SHIPPED | OUTPATIENT
Start: 2024-06-10 | End: 2024-07-23

## 2024-06-10 ASSESSMENT — PAIN SCALES - GENERAL: PAINLEVEL: 3

## 2024-06-13 ENCOUNTER — PATIENT MESSAGE (OUTPATIENT)
Dept: PRIMARY CARE | Facility: CLINIC | Age: 59
End: 2024-06-13
Payer: COMMERCIAL

## 2024-06-13 DIAGNOSIS — E66.09 CLASS 1 OBESITY DUE TO EXCESS CALORIES WITH BODY MASS INDEX (BMI) OF 34.0 TO 34.9 IN ADULT, UNSPECIFIED WHETHER SERIOUS COMORBIDITY PRESENT: ICD-10-CM

## 2024-06-13 RX ORDER — METFORMIN HYDROCHLORIDE 500 MG/1
500 TABLET, EXTENDED RELEASE ORAL
Qty: 90 TABLET | Refills: 1 | Status: SHIPPED | OUTPATIENT
Start: 2024-06-13

## 2024-06-15 ENCOUNTER — LAB (OUTPATIENT)
Dept: LAB | Facility: LAB | Age: 59
End: 2024-06-15
Payer: COMMERCIAL

## 2024-06-15 DIAGNOSIS — Z00.00 HEALTHCARE MAINTENANCE: ICD-10-CM

## 2024-06-15 DIAGNOSIS — N95.0 POSTMENOPAUSAL BLEEDING: ICD-10-CM

## 2024-06-15 LAB
25(OH)D3 SERPL-MCNC: 41 NG/ML (ref 30–100)
ERYTHROCYTE [DISTWIDTH] IN BLOOD BY AUTOMATED COUNT: 11.9 % (ref 11.5–14.5)
HCT VFR BLD AUTO: 40.6 % (ref 36–46)
HGB BLD-MCNC: 13.5 G/DL (ref 12–16)
MCH RBC QN AUTO: 30 PG (ref 26–34)
MCHC RBC AUTO-ENTMCNC: 33.3 G/DL (ref 32–36)
MCV RBC AUTO: 90 FL (ref 80–100)
NRBC BLD-RTO: 0 /100 WBCS (ref 0–0)
PLATELET # BLD AUTO: 198 X10*3/UL (ref 150–450)
RBC # BLD AUTO: 4.5 X10*6/UL (ref 4–5.2)
WBC # BLD AUTO: 6.5 X10*3/UL (ref 4.4–11.3)

## 2024-06-15 PROCEDURE — 86900 BLOOD TYPING SEROLOGIC ABO: CPT

## 2024-06-15 PROCEDURE — 86850 RBC ANTIBODY SCREEN: CPT

## 2024-06-15 PROCEDURE — 85027 COMPLETE CBC AUTOMATED: CPT

## 2024-06-15 PROCEDURE — 36415 COLL VENOUS BLD VENIPUNCTURE: CPT

## 2024-06-15 PROCEDURE — 82306 VITAMIN D 25 HYDROXY: CPT

## 2024-06-15 PROCEDURE — 86901 BLOOD TYPING SEROLOGIC RH(D): CPT

## 2024-06-16 ENCOUNTER — LAB REQUISITION (OUTPATIENT)
Dept: LAB | Facility: HOSPITAL | Age: 59
End: 2024-06-16
Payer: COMMERCIAL

## 2024-06-16 DIAGNOSIS — N95.0 POSTMENOPAUSAL BLEEDING: ICD-10-CM

## 2024-06-16 LAB
ABO GROUP (TYPE) IN BLOOD: NORMAL
ANTIBODY SCREEN: NORMAL
RH FACTOR (ANTIGEN D): NORMAL

## 2024-06-17 ENCOUNTER — ANESTHESIA EVENT (OUTPATIENT)
Dept: OPERATING ROOM | Facility: CLINIC | Age: 59
End: 2024-06-17
Payer: COMMERCIAL

## 2024-06-17 RX ORDER — LIDOCAINE IN NACL,ISO-OSMOT/PF 30 MG/3 ML
0.1 SYRINGE (ML) INJECTION ONCE
Status: CANCELLED | OUTPATIENT
Start: 2024-06-17 | End: 2024-06-17

## 2024-06-18 ENCOUNTER — TELEPHONE (OUTPATIENT)
Dept: OBSTETRICS AND GYNECOLOGY | Facility: CLINIC | Age: 59
End: 2024-06-18

## 2024-06-18 ENCOUNTER — ANESTHESIA (OUTPATIENT)
Dept: OPERATING ROOM | Facility: CLINIC | Age: 59
End: 2024-06-18
Payer: COMMERCIAL

## 2024-06-18 ENCOUNTER — HOSPITAL ENCOUNTER (OUTPATIENT)
Facility: CLINIC | Age: 59
Setting detail: OUTPATIENT SURGERY
Discharge: HOME | End: 2024-06-18
Attending: OBSTETRICS & GYNECOLOGY | Admitting: OBSTETRICS & GYNECOLOGY
Payer: COMMERCIAL

## 2024-06-18 VITALS
WEIGHT: 208.11 LBS | HEIGHT: 66 IN | DIASTOLIC BLOOD PRESSURE: 88 MMHG | TEMPERATURE: 97.3 F | SYSTOLIC BLOOD PRESSURE: 130 MMHG | RESPIRATION RATE: 16 BRPM | OXYGEN SATURATION: 100 % | HEART RATE: 78 BPM | BODY MASS INDEX: 33.45 KG/M2

## 2024-06-18 DIAGNOSIS — N95.0 POSTMENOPAUSAL BLEEDING: Primary | ICD-10-CM

## 2024-06-18 PROCEDURE — 3700000002 HC GENERAL ANESTHESIA TIME - EACH INCREMENTAL 1 MINUTE: Performed by: OBSTETRICS & GYNECOLOGY

## 2024-06-18 PROCEDURE — 2500000005 HC RX 250 GENERAL PHARMACY W/O HCPCS: Performed by: ANESTHESIOLOGIST ASSISTANT

## 2024-06-18 PROCEDURE — 3700000001 HC GENERAL ANESTHESIA TIME - INITIAL BASE CHARGE: Performed by: OBSTETRICS & GYNECOLOGY

## 2024-06-18 PROCEDURE — 7100000001 HC RECOVERY ROOM TIME - INITIAL BASE CHARGE: Performed by: OBSTETRICS & GYNECOLOGY

## 2024-06-18 PROCEDURE — 7100000010 HC PHASE TWO TIME - EACH INCREMENTAL 1 MINUTE: Performed by: OBSTETRICS & GYNECOLOGY

## 2024-06-18 PROCEDURE — A4217 STERILE WATER/SALINE, 500 ML: HCPCS | Performed by: OBSTETRICS & GYNECOLOGY

## 2024-06-18 PROCEDURE — 7100000009 HC PHASE TWO TIME - INITIAL BASE CHARGE: Performed by: OBSTETRICS & GYNECOLOGY

## 2024-06-18 PROCEDURE — 3600000008 HC OR TIME - EACH INCREMENTAL 1 MINUTE - PROCEDURE LEVEL THREE: Performed by: OBSTETRICS & GYNECOLOGY

## 2024-06-18 PROCEDURE — 2720000007 HC OR 272 NO HCPCS: Performed by: OBSTETRICS & GYNECOLOGY

## 2024-06-18 PROCEDURE — 58558 HYSTEROSCOPY BIOPSY: CPT | Performed by: OBSTETRICS & GYNECOLOGY

## 2024-06-18 PROCEDURE — 2500000001 HC RX 250 WO HCPCS SELF ADMINISTERED DRUGS (ALT 637 FOR MEDICARE OP): Performed by: STUDENT IN AN ORGANIZED HEALTH CARE EDUCATION/TRAINING PROGRAM

## 2024-06-18 PROCEDURE — 2500000004 HC RX 250 GENERAL PHARMACY W/ HCPCS (ALT 636 FOR OP/ED): Performed by: ANESTHESIOLOGIST ASSISTANT

## 2024-06-18 PROCEDURE — 3600000003 HC OR TIME - INITIAL BASE CHARGE - PROCEDURE LEVEL THREE: Performed by: OBSTETRICS & GYNECOLOGY

## 2024-06-18 PROCEDURE — 2500000004 HC RX 250 GENERAL PHARMACY W/ HCPCS (ALT 636 FOR OP/ED): Performed by: ANESTHESIOLOGY

## 2024-06-18 PROCEDURE — 7100000002 HC RECOVERY ROOM TIME - EACH INCREMENTAL 1 MINUTE: Performed by: OBSTETRICS & GYNECOLOGY

## 2024-06-18 PROCEDURE — A58555 PR HYSTEROSCOPY,DX,SEP PROC: Performed by: STUDENT IN AN ORGANIZED HEALTH CARE EDUCATION/TRAINING PROGRAM

## 2024-06-18 PROCEDURE — 2500000001 HC RX 250 WO HCPCS SELF ADMINISTERED DRUGS (ALT 637 FOR MEDICARE OP): Performed by: ANESTHESIOLOGY

## 2024-06-18 PROCEDURE — A58555 PR HYSTEROSCOPY,DX,SEP PROC: Performed by: ANESTHESIOLOGIST ASSISTANT

## 2024-06-18 PROCEDURE — 2500000001 HC RX 250 WO HCPCS SELF ADMINISTERED DRUGS (ALT 637 FOR MEDICARE OP): Performed by: OBSTETRICS & GYNECOLOGY

## 2024-06-18 PROCEDURE — 88305 TISSUE EXAM BY PATHOLOGIST: CPT | Mod: TC,SUR | Performed by: OBSTETRICS & GYNECOLOGY

## 2024-06-18 PROCEDURE — 2500000004 HC RX 250 GENERAL PHARMACY W/ HCPCS (ALT 636 FOR OP/ED): Performed by: OBSTETRICS & GYNECOLOGY

## 2024-06-18 RX ORDER — ACETAMINOPHEN 325 MG/1
650 TABLET ORAL EVERY 4 HOURS PRN
Status: DISCONTINUED | OUTPATIENT
Start: 2024-06-18 | End: 2024-06-18 | Stop reason: HOSPADM

## 2024-06-18 RX ORDER — OXYCODONE HYDROCHLORIDE 5 MG/1
5 TABLET ORAL EVERY 4 HOURS PRN
Status: DISCONTINUED | OUTPATIENT
Start: 2024-06-18 | End: 2024-06-18 | Stop reason: HOSPADM

## 2024-06-18 RX ORDER — LIDOCAINE HYDROCHLORIDE 20 MG/ML
INJECTION, SOLUTION INFILTRATION; PERINEURAL AS NEEDED
Status: DISCONTINUED | OUTPATIENT
Start: 2024-06-18 | End: 2024-06-18

## 2024-06-18 RX ORDER — LABETALOL HYDROCHLORIDE 5 MG/ML
5 INJECTION, SOLUTION INTRAVENOUS ONCE AS NEEDED
Status: DISCONTINUED | OUTPATIENT
Start: 2024-06-18 | End: 2024-06-18 | Stop reason: HOSPADM

## 2024-06-18 RX ORDER — SODIUM CITRATE AND CITRIC ACID MONOHYDRATE 334; 500 MG/5ML; MG/5ML
30 SOLUTION ORAL ONCE
Status: COMPLETED | OUTPATIENT
Start: 2024-06-18 | End: 2024-06-18

## 2024-06-18 RX ORDER — GABAPENTIN 600 MG/1
600 TABLET ORAL ONCE
Status: COMPLETED | OUTPATIENT
Start: 2024-06-18 | End: 2024-06-18

## 2024-06-18 RX ORDER — SODIUM CHLORIDE, SODIUM LACTATE, POTASSIUM CHLORIDE, CALCIUM CHLORIDE 600; 310; 30; 20 MG/100ML; MG/100ML; MG/100ML; MG/100ML
100 INJECTION, SOLUTION INTRAVENOUS CONTINUOUS
Status: DISCONTINUED | OUTPATIENT
Start: 2024-06-18 | End: 2024-06-18 | Stop reason: HOSPADM

## 2024-06-18 RX ORDER — ACETAMINOPHEN 325 MG/1
975 TABLET ORAL ONCE
Status: COMPLETED | OUTPATIENT
Start: 2024-06-18 | End: 2024-06-18

## 2024-06-18 RX ORDER — SODIUM CHLORIDE 0.9 G/100ML
IRRIGANT IRRIGATION AS NEEDED
Status: DISCONTINUED | OUTPATIENT
Start: 2024-06-18 | End: 2024-06-18 | Stop reason: HOSPADM

## 2024-06-18 RX ORDER — PROPOFOL 10 MG/ML
INJECTION, EMULSION INTRAVENOUS AS NEEDED
Status: DISCONTINUED | OUTPATIENT
Start: 2024-06-18 | End: 2024-06-18

## 2024-06-18 RX ORDER — MIDAZOLAM HYDROCHLORIDE 1 MG/ML
INJECTION, SOLUTION INTRAMUSCULAR; INTRAVENOUS AS NEEDED
Status: DISCONTINUED | OUTPATIENT
Start: 2024-06-18 | End: 2024-06-18

## 2024-06-18 RX ORDER — FENTANYL CITRATE 50 UG/ML
25 INJECTION, SOLUTION INTRAMUSCULAR; INTRAVENOUS EVERY 5 MIN PRN
Status: DISCONTINUED | OUTPATIENT
Start: 2024-06-18 | End: 2024-06-18 | Stop reason: HOSPADM

## 2024-06-18 RX ORDER — ONDANSETRON HYDROCHLORIDE 2 MG/ML
INJECTION, SOLUTION INTRAVENOUS AS NEEDED
Status: DISCONTINUED | OUTPATIENT
Start: 2024-06-18 | End: 2024-06-18

## 2024-06-18 RX ORDER — SODIUM CHLORIDE, SODIUM LACTATE, POTASSIUM CHLORIDE, CALCIUM CHLORIDE 600; 310; 30; 20 MG/100ML; MG/100ML; MG/100ML; MG/100ML
INJECTION, SOLUTION INTRAVENOUS CONTINUOUS PRN
Status: DISCONTINUED | OUTPATIENT
Start: 2024-06-18 | End: 2024-06-18

## 2024-06-18 RX ORDER — FENTANYL CITRATE 50 UG/ML
50 INJECTION, SOLUTION INTRAMUSCULAR; INTRAVENOUS EVERY 5 MIN PRN
Status: DISCONTINUED | OUTPATIENT
Start: 2024-06-18 | End: 2024-06-18 | Stop reason: HOSPADM

## 2024-06-18 RX ORDER — ONDANSETRON HYDROCHLORIDE 2 MG/ML
4 INJECTION, SOLUTION INTRAVENOUS ONCE AS NEEDED
Status: DISCONTINUED | OUTPATIENT
Start: 2024-06-18 | End: 2024-06-18 | Stop reason: HOSPADM

## 2024-06-18 ASSESSMENT — PAIN - FUNCTIONAL ASSESSMENT
PAIN_FUNCTIONAL_ASSESSMENT: 0-10

## 2024-06-18 ASSESSMENT — PAIN SCALES - GENERAL
PAINLEVEL_OUTOF10: 0 - NO PAIN
PAINLEVEL_OUTOF10: 5 - MODERATE PAIN
PAINLEVEL_OUTOF10: 5 - MODERATE PAIN
PAIN_LEVEL: 0
PAINLEVEL_OUTOF10: 3
PAINLEVEL_OUTOF10: 5 - MODERATE PAIN
PAINLEVEL_OUTOF10: 3

## 2024-06-18 ASSESSMENT — ENCOUNTER SYMPTOMS: HEADACHES: 1

## 2024-06-18 NOTE — ANESTHESIA POSTPROCEDURE EVALUATION
Patient: Kailyn Lopez    Procedure Summary       Date: 06/18/24 Room / Location[place] [facility]    Anesthesia Start: 0722 Anesthesia Stop: 0834    Procedure: Hysteroscopy Diagnosis:       Postmenopausal bleeding      (Postmenopausal bleeding [N95.0])    Surgeons: Kailyn Cabrera MD Responsible Provider: Ahsan Burleson MD    Anesthesia Type: general ASA Status: 3            Anesthesia Type: general    Vitals Value Taken Time   /70 06/18/24 0901   Temp 36.3 °C (97.3 °F) 06/18/24 0901   Pulse 69 06/18/24 0901   Resp 16 06/18/24 0901   SpO2 100 % 06/18/24 0901       Anesthesia Post Evaluation    Patient location during evaluation: bedside  Patient participation: complete - patient participated  Level of consciousness: awake  Pain score: 0  Pain management: adequate  Airway patency: patent  Cardiovascular status: acceptable  Respiratory status: acceptable  Hydration status: acceptable  Postoperative Nausea and Vomiting: none        No notable events documented.

## 2024-06-18 NOTE — OP NOTE
Date: 2024  [facility]    Name: Kailyn Lopez, : 1965, Age: 58 y.o., MRN: [id number], Sex: female    Diagnosis  Pre-op Diagnosis     * Postmenopausal bleeding [N95.0] Post-op Diagnosis     * Postmenopausal bleeding [N95.0]     Procedures  Hysteroscopy  90521 - MS HYSTEROSCOPY DIAGNOSTIC SEPARATE PROCEDURE    Surgeons      * Kailyn Cabrera - Primary    Resident/Fellow/Other Assistant:  Surgeons and Role:  * No surgeons found with a matching role *    Procedure Summary  Anesthesia: General  ASA: III  Anesthesia Staff: Anesthesiologist: Ahsan Burleson MD  C-AA: JACLYN Lindo  Estimated Blood Loss: 10 mL  Intra-op Medications:   Administrations occurring from 0730 to 0845 on 24:   Medication Name Total Dose   sodium chloride 0.9 % irrigation solution 750 mL   surgical lubricant gel 1 Application              Anesthesia Record               Intraprocedure I/O Totals       None           Specimen:   ID Type Source Tests Collected by Time   1 : endometrial curettings Tissue ENDOMETRIUM CURETTINGS SURGICAL PATHOLOGY EXAM Kailyn Cabrera MD 2024 0807   2 : endocervix fluid Tissue ENDOCERVIX CURETTINGS SURGICAL PATHOLOGY EXAM Kailyn Cabrera MD 2024 0821        Staff:   Circulator: Leticia Martinez Person: Charo    CONSENT: The patient was given a verbal description of the the risks, benefits, and alternatives of the procedure and written informed consent was obtained.    FINDINGS: Cervical os flush and stenotic on initial inspection. After dilation small amounts of bloody fluid expressed from os. Small uterine cavity with no polyps or masses. Bilateral tubal ostia visualized. Fluid deficit 400 mL.     PROCEDURE DETAIL(S):   The patient was placed in the dorsal lithotomy position. General anesthesia was found to be adequate. She was prepped and draped in the normal sterile fashion An exam was performed noting a mid-position uterus. A weighted speculum was inserted into  the vagina as well as a right angle to give excellent visualization of the cervix. A single-toothed tenaculum was then used to grasp the anterior lip of the cervix. The cervix was no able to be dilated with standard dilators. Lacrimal dilator used to perforated membrane over cervical os. The cervix was then serially dilated to accommodate a hysteroscope. Hysteroscope was inserted without difficulty with above findings. Random sampling performed with the Aveta hysteroscope.     The hysteroscope was removed and a gentle curettage was performed.   All instruments were then removed. The tenaculum sites were noted to be hemostatic. The patient tolerated the procedure well. There were no complications.      Kailyn Cabrera MD

## 2024-06-18 NOTE — ANESTHESIA PREPROCEDURE EVALUATION
Patient: Kailyn Lopez    Procedure Information       Anesthesia Start Date/Time: 06/18/24 0704    Procedure: Hysteroscopy    Location: [facility] OR 05 / Virtual [facility] [place]    Surgeons: Kailyn Cabrera MD            Relevant Problems   Pulmonary   (+) Intermittent asthma without complication, unspecified asthma severity (HHS-HCC)   (+) Obstructive sleep apnea (adult) (pediatric)      Neuro   (+) Anxiety   (+) Depression      GI   (+) GERD (gastroesophageal reflux disease)   (+) Irritable bowel syndrome with predominant constipation      Endocrine   (+) Class 2 obesity with body mass index (BMI) of 38.0 to 38.9 in adult   (+) Hypothyroidism   (+) Hypothyroidism due to Hashimoto's thyroiditis      Musculoskeletal   (+) Cervical spondylosis   (+) Fibromyalgia      HEENT   (+) Asymmetric SNHL (sensorineural hearing loss)       Clinical information reviewed:   Tobacco  Allergies  Meds   Med Hx  Surg Hx   Fam Hx  Soc Hx        NPO/Void Status  Date of Last Liquid: 06/18/24  Time of Last Liquid: 0500  Date of Last Solid: 06/17/24  Time of Last Solid: 2200           Past Medical History:   Diagnosis Date    Adjustment insomnia 05/07/2021    Adjustment insomnia    Anxiety 1986    Benign paroxysmal vertigo, unspecified ear 03/29/2021    Vertigo, benign positional    Epigastric pain 07/19/2021    Dyspepsia    Fibromyalgia, primary 2023    Headache 1990    Headache, tension-type 1990    Menopausal and female climacteric states 07/30/2021    Perimenopausal    Migraine 2015    Other specified postprocedural states     History of Papanicolaou smear    Personal history of other diseases of the musculoskeletal system and connective tissue 03/31/2021    History of neck pain    Personal history of other medical treatment 05/28/2020    History of screening mammography    Personal history of other medical treatment     History of mammogram    Personal history of other specified conditions 06/03/2021    History of headache     Rash and other nonspecific skin eruption 2015    Rash    Sleep apnea       Past Surgical History:   Procedure Laterality Date    OTHER SURGICAL HISTORY  2020    Esophagogastroduodenoscopy    OTHER SURGICAL HISTORY  2020    Nasal septoplasty    OTHER SURGICAL HISTORY  2020    Colonoscopy    OTHER SURGICAL HISTORY Right     peroneal tendon repair     Social History     Tobacco Use    Smoking status: Former     Current packs/day: 0.00     Average packs/day: 0.3 packs/day for 2.0 years (0.5 ttl pk-yrs)     Types: Cigarettes     Start date: 1990     Quit date: 1992     Years since quittin.4    Smokeless tobacco: Never    Tobacco comments:     Quit 30 yrs ago   Substance Use Topics    Alcohol use: Yes     Comment: 2 glasses wine a month    Drug use: Never      Current Outpatient Medications   Medication Instructions    acetaminophen (Tylenol) 325 mg tablet oral, Every 6 hours PRN    albuterol 90 mcg/actuation inhaler 1-2 puffs, inhalation, Every 4 hours PRN, EVERY 4 TO 6 HOURS AS NEEDED.    busPIRone (BUSPAR) 10 mg, oral, Daily PRN    carbinoxamine maleate 4 mg tablet oral    cholecalciferol (Vitamin D-3) 125 MCG (5000 UT) capsule 1 capsule, oral, Daily    citalopram (CELEXA) 20 mg, oral, Daily    citalopram (CELEXA) 10 mg, oral, Daily    doxycycline (ORACEA) 40 mg, oral, Every morning, Do not crush or chew. Take with a full glass of water and do not lie down for at least 30 minutes after.    estradiol (ESTRACE) 1 mg, oral, Daily    famotidine (Pepcid) 20 mg tablet 1 tablet, oral, Daily    fexofenadine (Allegra) 60 mg tablet oral, Daily    gabapentin (Neurontin) 100 mg capsule Take 1 capsule (100 mg) by mouth 3 times a day for 7 days, THEN 2 capsules (200 mg) 3 times a day for 7 days, THEN 3 capsules (300 mg) 3 times a day.    levothyroxine (Synthroid, Levoxyl) 100 mcg tablet 1 tablet, oral, Daily    liothyronine (Cytomel) 5 mcg tablet 0.5 tablets, oral, Daily    metFORMIN XR  "(GLUCOPHAGE-XR) 500 mg, oral, Daily with evening meal, Do not crush, chew, or split    mometasone furoate, bulk, 100 % powder Compound 1 mg capsule to be added to sinus rinse twice daily.    olopatadine (Patanase) 0.6 % spray,non-aerosol nasal spray SPRAY 2 SPRAYS INTO AFFECTED NOSTRIL(S) TWICE A DAY    omeprazole (PRILOSEC) 40 mg, oral, Daily PRN, IN THE MORNING. 60 min BEFORE a MEAL    progesterone (PROMETRIUM) 100 mg, oral, Nightly    semaglutide (Ozempic) 0.25 mg or 0.5 mg(2 mg/1.5 mL) pen injector Inject 0.5mg weekly    SUMAtriptan (IMITREX) 50 mg, oral, As needed, FOR MIGRAINE RELIEF. MAY REPEAT EVERY 2 HOURS MAXIMUM  MG PER DAY    ubrogepant (UBRELVY) 100 mg, oral, As needed      Allergies   Allergen Reactions    Nsaids (Non-Steroidal Anti-Inflammatory Drug) Swelling    Ace Inhibitors Unknown    Animal Dander Unknown    Aspirin Hives    Cat Dander Unknown    Drospirenone-E.Estradiol-Lm.Fa Other    Estrogens Unknown    Grass Pollen Other    Nabumetone Unknown    Other Unknown     Pollen, ragweet, dust mites    Ragweed Unknown    Tree And Shrub Pollen Unknown        Chemistry    Lab Results   Component Value Date/Time     09/20/2023 0828    K 4.9 09/20/2023 0828     09/20/2023 0828    CO2 29 09/20/2023 0828    BUN 14 09/20/2023 0828    CREATININE 0.81 09/20/2023 0828    Lab Results   Component Value Date/Time    CALCIUM 10.1 09/20/2023 0828    ALKPHOS 84 09/14/2023 1052    AST 22 09/14/2023 1052    ALT 23 09/14/2023 1052    BILITOT 0.6 09/14/2023 1052          Lab Results   Component Value Date/Time    WBC 6.5 06/15/2024 0959    HGB 13.5 06/15/2024 0959    HCT 40.6 06/15/2024 0959     06/15/2024 0959     No results found for: \"PROTIME\", \"PTT\", \"INR\"  No results found for this or any previous visit (from the past 4464 hour(s)).  No results found for this or any previous visit from the past 1095 days.       Visit Vitals  /78   Pulse 73   Temp 36.7 °C (98.1 °F) (Temporal)   Resp 16 " "  Ht 1.676 m (5' 6\")   Wt 94.4 kg (208 lb 1.8 oz)   LMP  (LMP Unknown)   SpO2 100%   BMI 33.59 kg/m²   OB Status Postmenopausal   Smoking Status Former   BSA 2.1 m²        Anesthesia Evaluation      History of anesthetic complications   Airway   Mallampati: IV  TM distance: >3 FB  Neck ROM: full  Dental      Pulmonary    (+) asthma, sleep apnea  Cardiovascular     Rate: normal    Neuro/Psych    (+) headaches    GI/Hepatic/Renal    (+) GERD    Endo/Other    (+) hypothyroidism  Abdominal      Other findings: Small mouth opening                 Physical Exam    Airway  Mallampati: IV  TM distance: >3 FB  Neck ROM: full     Cardiovascular   Rate: normal     Dental    Pulmonary    Abdominal      Other findings: Small mouth opening           Anesthesia Plan    History of general anesthesia?: yes  History of complications of general anesthesia?: no    ASA 3     general     intravenous induction   Anesthetic plan and risks discussed with patient.    Plan discussed with [facility].        "

## 2024-06-18 NOTE — ANESTHESIA PROCEDURE NOTES
Airway  Date/Time: 6/18/2024 7:37 AM  Urgency: elective    Airway not difficult    Staffing  Performed: [facility]   Authorized by: Ahsan Burleson MD    Performed by: JACLYN Lindo  Patient location during procedure: OR    Indications and Patient Condition  Indications for airway management: anesthesia  Spontaneous Ventilation: absent  Sedation level: deep  Preoxygenated: yes  Mask difficulty assessment: 1 - vent by mask    Final Airway Details  Final airway type: supraglottic airway      Successful airway: Size 4     Number of attempts at approach: 1  Number of other approaches attempted: 0

## 2024-06-18 NOTE — TELEPHONE ENCOUNTER
Patient is scheduled for 7/10/2024 at 4:15 for post op appointment. Patient has no questions or concerns at this time.  ----- Message from Kailyn Cabrera MD sent at 6/18/2024  7:21 AM EDT -----  Regarding: Post op  Hi! I did a surgery for Kailyn today - can we get her a post-op appointment in the 2-4 weeks? Thanks!

## 2024-06-18 NOTE — DISCHARGE INSTRUCTIONS
Post Operative Hysteroscopy, D&C Instructions    You will have some post-operative vaginal bleeding. This should stop after 2-4 days.   If you are having bleeding soaking >2 pads per hour, please call the office or visit the emergency room.     Pain Medications:  - You may take tylenol and Celebrex for pain, as needed.    Call if you have:  Temperature greater than 100.4  Persistent nausea and vomiting  Severe uncontrolled pain  Difficulty breathing, headache or visual disturbances  Hives  Persistent dizziness or light-headedness  Extreme fatigue  Any other questions or concerns you may have after discharge    In an emergency, call 911 or go to an Emergency Department at a nearby hospital.

## 2024-06-18 NOTE — H&P
History Of Present Illness  Kailyn Lopez is a 58 y.o. female presenting with post menopausal bleeding.     Past Medical History  Past Medical History:   Diagnosis Date    Adjustment insomnia 05/07/2021    Adjustment insomnia    Anxiety 1986    Benign paroxysmal vertigo, unspecified ear 03/29/2021    Vertigo, benign positional    Epigastric pain 07/19/2021    Dyspepsia    Fibromyalgia, primary 2023    Headache 1990    Headache, tension-type 1990    Menopausal and female climacteric states 07/30/2021    Perimenopausal    Migraine 2015    Other specified postprocedural states     History of Papanicolaou smear    Personal history of other diseases of the musculoskeletal system and connective tissue 03/31/2021    History of neck pain    Personal history of other medical treatment 05/28/2020    History of screening mammography    Personal history of other medical treatment     History of mammogram    Personal history of other specified conditions 06/03/2021    History of headache    Rash and other nonspecific skin eruption 07/31/2015    Rash    Sleep apnea 2020       Surgical History  Past Surgical History:   Procedure Laterality Date    OTHER SURGICAL HISTORY  09/06/2020    Esophagogastroduodenoscopy    OTHER SURGICAL HISTORY  05/26/2020    Nasal septoplasty    OTHER SURGICAL HISTORY  09/06/2020    Colonoscopy    OTHER SURGICAL HISTORY Right     peroneal tendon repair        Social History  She reports that she quit smoking about 32 years ago. Her smoking use included cigarettes. She started smoking about 34 years ago. She has a 0.5 pack-year smoking history. She has never used smokeless tobacco. She reports current alcohol use. She reports that she does not use drugs.    Family History  Family History   Problem Relation Name Age of Onset    Arthritis Mother Courtney     Migraines Mother Courtney     Allergies Father Cassius     Coronary artery disease Father Cassius     Depression Father Cassius     Allergies Brother Kel      Migraines Brother Kel     Depression Brother Kel     Arthritis Maternal Grandmother Kathy     Heart failure Maternal Grandmother Kathy     Migraines Maternal Grandmother Kathy     Stroke Maternal Grandmother Kathy     Arthritis Paternal Grandmother      Coronary artery disease Paternal Grandfather          Allergies  Nsaids (non-steroidal anti-inflammatory drug), Ace inhibitors, Animal dander, Aspirin, Cat dander, Drospirenone-e.estradiol-lm.fa, Estrogens, Grass pollen, Nabumetone, Other, Ragweed, and Tree and shrub pollen    Review of Systems     Physical Exam     Last Recorded Vitals  There were no vitals taken for this visit.    Relevant Results  Recent Results (from the past 168 hour(s))   Vitamin D 25-Hydroxy,Total (for eval of Vitamin D levels)    Collection Time: 06/15/24  9:59 AM   Result Value Ref Range    Vitamin D, 25-Hydroxy, Total 41 30 - 100 ng/mL   CBC    Collection Time: 06/15/24  9:59 AM   Result Value Ref Range    WBC 6.5 4.4 - 11.3 x10*3/uL    nRBC 0.0 0.0 - 0.0 /100 WBCs    RBC 4.50 4.00 - 5.20 x10*6/uL    Hemoglobin 13.5 12.0 - 16.0 g/dL    Hematocrit 40.6 36.0 - 46.0 %    MCV 90 80 - 100 fL    MCH 30.0 26.0 - 34.0 pg    MCHC 33.3 32.0 - 36.0 g/dL    RDW 11.9 11.5 - 14.5 %    Platelets 198 150 - 450 x10*3/uL   Type And Screen    Collection Time: 06/15/24  9:59 AM   Result Value Ref Range    ABO TYPE AB     Rh TYPE POS     ANTIBODY SCREEN NEG          Assessment/Plan   Principal Problem:    Postmenopausal bleeding      Plan to proceed with hysteroscopy, dilation and curettage, other indicated procedures.  Surgical consent was reviewed. The risks of surgery were discussed including: bleeding (including need for blood transfusion in life-threatening situations; risks of transfusion), infection, damage to surrounding organs. The patient had the opportunity to answer questions and desired to proceed with surgery following our discussion. Both verbal and written consents were  obtained.    Kailyn Cabrera MD

## 2024-06-19 ENCOUNTER — APPOINTMENT (OUTPATIENT)
Dept: INTEGRATIVE MEDICINE | Facility: CLINIC | Age: 59
End: 2024-06-19
Payer: COMMERCIAL

## 2024-06-19 DIAGNOSIS — F41.9 ANXIETY: ICD-10-CM

## 2024-06-19 RX ORDER — CITALOPRAM 20 MG/1
20 TABLET, FILM COATED ORAL DAILY
Qty: 90 TABLET | Refills: 1 | Status: SHIPPED | OUTPATIENT
Start: 2024-06-19

## 2024-06-19 ASSESSMENT — PAIN SCALES - GENERAL: PAINLEVEL_OUTOF10: 5 - MODERATE PAIN

## 2024-06-21 ENCOUNTER — APPOINTMENT (OUTPATIENT)
Dept: OTOLARYNGOLOGY | Facility: CLINIC | Age: 59
End: 2024-06-21
Payer: COMMERCIAL

## 2024-06-24 ENCOUNTER — APPOINTMENT (OUTPATIENT)
Dept: PHYSICAL MEDICINE AND REHAB | Facility: CLINIC | Age: 59
End: 2024-06-24
Payer: COMMERCIAL

## 2024-06-26 ENCOUNTER — TELEPHONE (OUTPATIENT)
Dept: OTOLARYNGOLOGY | Facility: CLINIC | Age: 59
End: 2024-06-26
Payer: COMMERCIAL

## 2024-06-27 ENCOUNTER — TELEPHONE (OUTPATIENT)
Dept: OTOLARYNGOLOGY | Facility: CLINIC | Age: 59
End: 2024-06-27
Payer: COMMERCIAL

## 2024-06-27 DIAGNOSIS — Z79.890 MENOPAUSAL SYNDROME ON HORMONE REPLACEMENT THERAPY: Primary | ICD-10-CM

## 2024-06-27 DIAGNOSIS — N95.1 MENOPAUSAL SYNDROME ON HORMONE REPLACEMENT THERAPY: Primary | ICD-10-CM

## 2024-06-27 RX ORDER — ESTRADIOL 1.53 MG/1
1 SPRAY TRANSDERMAL DAILY
Qty: 8.1 ML | Refills: 3 | Status: SHIPPED | OUTPATIENT
Start: 2024-06-27 | End: 2025-06-27

## 2024-07-01 LAB
LABORATORY COMMENT REPORT: NORMAL
PATH REPORT.FINAL DX SPEC: NORMAL
PATH REPORT.GROSS SPEC: NORMAL
PATH REPORT.RELEVANT HX SPEC: NORMAL
PATH REPORT.TOTAL CANCER: NORMAL

## 2024-07-10 ENCOUNTER — APPOINTMENT (OUTPATIENT)
Dept: PRIMARY CARE | Facility: CLINIC | Age: 59
End: 2024-07-10
Payer: COMMERCIAL

## 2024-07-10 ENCOUNTER — OFFICE VISIT (OUTPATIENT)
Dept: OBSTETRICS AND GYNECOLOGY | Facility: CLINIC | Age: 59
End: 2024-07-10
Payer: COMMERCIAL

## 2024-07-10 VITALS
OXYGEN SATURATION: 98 % | BODY MASS INDEX: 32.78 KG/M2 | HEART RATE: 90 BPM | SYSTOLIC BLOOD PRESSURE: 100 MMHG | WEIGHT: 204 LBS | DIASTOLIC BLOOD PRESSURE: 78 MMHG | HEIGHT: 66 IN

## 2024-07-10 VITALS — DIASTOLIC BLOOD PRESSURE: 78 MMHG | SYSTOLIC BLOOD PRESSURE: 102 MMHG

## 2024-07-10 DIAGNOSIS — R23.2 HOT FLASHES: Primary | Chronic | ICD-10-CM

## 2024-07-10 DIAGNOSIS — E03.9 HYPOTHYROIDISM, UNSPECIFIED TYPE: ICD-10-CM

## 2024-07-10 DIAGNOSIS — K21.00 GASTROESOPHAGEAL REFLUX DISEASE WITH ESOPHAGITIS WITHOUT HEMORRHAGE: ICD-10-CM

## 2024-07-10 DIAGNOSIS — E11.9 TYPE 2 DIABETES MELLITUS WITHOUT COMPLICATION, WITHOUT LONG-TERM CURRENT USE OF INSULIN (MULTI): Chronic | ICD-10-CM

## 2024-07-10 DIAGNOSIS — N95.0 POSTMENOPAUSAL BLEEDING: Primary | ICD-10-CM

## 2024-07-10 DIAGNOSIS — Z82.49 FAMILY HISTORY OF MI (MYOCARDIAL INFARCTION): ICD-10-CM

## 2024-07-10 DIAGNOSIS — M54.59 MECHANICAL LOW BACK PAIN: ICD-10-CM

## 2024-07-10 DIAGNOSIS — K22.70 BARRETT'S ESOPHAGUS WITHOUT DYSPLASIA: Chronic | ICD-10-CM

## 2024-07-10 LAB — HBA1C MFR BLD: 5.5 % (ref 4.2–6.5)

## 2024-07-10 PROCEDURE — 99214 OFFICE O/P EST MOD 30 MIN: CPT | Performed by: STUDENT IN AN ORGANIZED HEALTH CARE EDUCATION/TRAINING PROGRAM

## 2024-07-10 PROCEDURE — 3008F BODY MASS INDEX DOCD: CPT | Performed by: STUDENT IN AN ORGANIZED HEALTH CARE EDUCATION/TRAINING PROGRAM

## 2024-07-10 PROCEDURE — 83036 HEMOGLOBIN GLYCOSYLATED A1C: CPT | Mod: CLIA WAIVED TEST | Performed by: STUDENT IN AN ORGANIZED HEALTH CARE EDUCATION/TRAINING PROGRAM

## 2024-07-10 PROCEDURE — 3078F DIAST BP <80 MM HG: CPT | Performed by: STUDENT IN AN ORGANIZED HEALTH CARE EDUCATION/TRAINING PROGRAM

## 2024-07-10 PROCEDURE — 1036F TOBACCO NON-USER: CPT | Performed by: OBSTETRICS & GYNECOLOGY

## 2024-07-10 PROCEDURE — 3044F HG A1C LEVEL LT 7.0%: CPT | Performed by: STUDENT IN AN ORGANIZED HEALTH CARE EDUCATION/TRAINING PROGRAM

## 2024-07-10 PROCEDURE — 99213 OFFICE O/P EST LOW 20 MIN: CPT | Performed by: OBSTETRICS & GYNECOLOGY

## 2024-07-10 PROCEDURE — 3008F BODY MASS INDEX DOCD: CPT | Performed by: OBSTETRICS & GYNECOLOGY

## 2024-07-10 PROCEDURE — 3074F SYST BP LT 130 MM HG: CPT | Performed by: STUDENT IN AN ORGANIZED HEALTH CARE EDUCATION/TRAINING PROGRAM

## 2024-07-10 NOTE — PROGRESS NOTES
Subjective   Patient ID: Kailyn Lopez is a 58 y.o. female who presents for Follow-up (Follow up /A1C check /Had questions about a past EKG she has when in the ER last year).  Concerned about prior EKG from one year ago which stated possible left atrial enlargement. Reports father with MI.     Her gyn recently increased her estrogen dose about 1 week ago.     Started on weight watchers again. Thinking about resuming her ozempic again.     Gets back and neck pain from gaining weight in her breasts. Sometimes gets rashes under her breasts.     Last A1c 5.8%     Overdue for EGD for her barretts.     No other concerns today.         Review of Systems   Constitutional:  Positive for fatigue.   HENT:  Positive for congestion.    Respiratory: Negative.     Cardiovascular: Negative.    Gastrointestinal: Negative.    Musculoskeletal:  Positive for arthralgias, back pain and neck pain.   Neurological: Negative.    Psychiatric/Behavioral: Negative.     All other systems reviewed and are negative.      Objective   Physical Exam  Vitals reviewed.   Constitutional:       General: She is not in acute distress.     Appearance: Normal appearance.   HENT:      Head: Normocephalic and atraumatic.   Eyes:      Pupils: Pupils are equal, round, and reactive to light.   Cardiovascular:      Rate and Rhythm: Normal rate and regular rhythm.   Pulmonary:      Effort: Pulmonary effort is normal. No respiratory distress.   Musculoskeletal:         General: Normal range of motion.   Skin:     General: Skin is warm and dry.   Neurological:      Mental Status: She is alert. Mental status is at baseline.   Psychiatric:         Mood and Affect: Mood normal.         Behavior: Behavior normal.         Body mass index is 32.93 kg/m².      Current Outpatient Medications:     acetaminophen (Tylenol) 325 mg tablet, Take by mouth every 6 hours if needed for mild pain (1 - 3)., Disp: , Rfl:     albuterol 90 mcg/actuation inhaler, Inhale 1-2 puffs every 4  hours if needed for wheezing. EVERY 4 TO 6 HOURS AS NEEDED., Disp: 18 g, Rfl: 3    busPIRone (Buspar) 5 mg tablet, Take 2 tablets (10 mg) by mouth once daily as needed (anxiety)., Disp: 60 tablet, Rfl: 2    carbinoxamine maleate 4 mg tablet, Take by mouth., Disp: , Rfl:     cholecalciferol (Vitamin D-3) 125 MCG (5000 UT) capsule, Take 1 capsule (125 mcg) by mouth once daily., Disp: , Rfl:     citalopram (CeleXA) 10 mg tablet, TAKE 1 TABLET BY MOUTH EVERY DAY, Disp: 90 tablet, Rfl: 1    citalopram (CeleXA) 20 mg tablet, TAKE 1 TABLET BY MOUTH EVERY DAY, Disp: 90 tablet, Rfl: 1    doxycycline (Oracea) 40 mg DR capsule, Take 1 capsule (40 mg) by mouth once daily in the morning. Do not crush or chew. Take with a full glass of water and do not lie down for at least 30 minutes after., Disp: , Rfl:     estradiol (Evamist) 1.53 mg/spray (1.7%) transdermal spray, Place 1 spray on the skin once daily., Disp: 8.1 mL, Rfl: 3    famotidine (Pepcid) 20 mg tablet, Take 1 tablet (20 mg) by mouth once daily., Disp: , Rfl:     fexofenadine (Allegra) 60 mg tablet, Take by mouth once daily., Disp: , Rfl:     gabapentin (Neurontin) 100 mg capsule, Take 1 capsule (100 mg) by mouth 3 times a day for 7 days, THEN 2 capsules (200 mg) 3 times a day for 7 days, THEN 3 capsules (300 mg) 3 times a day., Disp: 333 capsule, Rfl: 0    levothyroxine (Synthroid, Levoxyl) 100 mcg tablet, Take 1 tablet (100 mcg) by mouth once daily., Disp: , Rfl:     liothyronine (Cytomel) 5 mcg tablet, Take 0.5 tablets (2.5 mcg) by mouth once daily., Disp: , Rfl:     metFORMIN  mg 24 hr tablet, Take 1 tablet (500 mg) by mouth once daily in the evening. Take with meals. Do not crush, chew, or split, Disp: 90 tablet, Rfl: 1    mometasone furoate, bulk, 100 % powder, Compound 1 mg capsule to be added to sinus rinse twice daily., Disp: 180 g, Rfl: 3    olopatadine (Patanase) 0.6 % spray,non-aerosol nasal spray, SPRAY 2 SPRAYS INTO AFFECTED NOSTRIL(S) TWICE A DAY,  Disp: 91.5 g, Rfl: 1    omeprazole (PriLOSEC) 40 mg DR capsule, Take 1 capsule (40 mg) by mouth once daily as needed. IN THE MORNING. 60 min BEFORE a MEAL, Disp: , Rfl:     progesterone (Prometrium) 100 mg capsule, Take 1 capsule (100 mg) by mouth once daily at bedtime., Disp: , Rfl:     semaglutide (Ozempic) 0.25 mg or 0.5 mg(2 mg/1.5 mL) pen injector, Inject 0.5mg weekly (Patient not taking: Reported on 6/18/2024), Disp: 3 mL, Rfl: 2    SUMAtriptan (Imitrex) 50 mg tablet, Take 1 tablet (50 mg) by mouth if needed for migraine for up to 1 dose. FOR MIGRAINE RELIEF. MAY REPEAT EVERY 2 HOURS MAXIMUM  MG PER DAY, Disp: 9 tablet, Rfl: 11    ubrogepant (Ubrelvy) 100 mg tablet tablet, Take 1 tablet (100 mg) by mouth if needed (At onset of migraine.  Okay to repeat in 2 hours if needed.)., Disp: 16 tablet, Rfl: 3      Assessment/Plan   Diagnoses and all orders for this visit:  Hot flashes  Comments:  working with gyn for HRT  Type 2 diabetes mellitus without complication, without long-term current use of insulin (Multi)  Comments:  a1c stable  Orders:  -     POCT Glycosylated Hemoglobin (HGB A1C) docked device  Hypothyroidism, unspecified type  BMI 32.0-32.9,adult  Comments:  resume ozempic for blood sugar control  increase physical activity as tolerated  continue weight watchers  Family history of MI (myocardial infarction)  Comments:  CT calcium score ordered  Orders:  -     CT cardiac scoring wo IV contrast; Future  Gastroesophageal reflux disease with esophagitis without hemorrhage  Hui's esophagus without dysplasia  Comments:  recommend following up with GI for EGD  Mechanical low back pain  Other orders  -     POCT GLYCOSYLATED HEMOGLOBIN (HGB A1C)    The patient received Provided instructions on dietary changes  Provided instructions on exercise because they have an above normal BMI.      Follow up in 3 months       Oneyda Rosado DO 07/11/24 1:02 PM

## 2024-07-10 NOTE — PROGRESS NOTES
SUBJECTIVE    58 y.o.  Postmenopausal presents for postoperative visit. She had a hysteroscopy/d&c on .      Overall doing well. Had 2 days of cramping and bleeding after procedure, none since.    OBJECTIVE  Vitals:    07/10/24 1616   BP: 102/78     There is no height or weight on file to calculate BMI.     Physical Exam  Constitutional:       Appearance: Normal appearance.   HENT:      Head: Normocephalic and atraumatic.      Nose: Nose normal.      Mouth/Throat:      Mouth: Mucous membranes are moist.   Eyes:      Extraocular Movements: Extraocular movements intact.      Pupils: Pupils are equal, round, and reactive to light.   Cardiovascular:      Rate and Rhythm: Normal rate.   Pulmonary:      Effort: Pulmonary effort is normal.   Musculoskeletal:         General: Normal range of motion.      Cervical back: Normal range of motion.   Neurological:      General: No focal deficit present.      Mental Status: She is alert and oriented to person, place, and time.   Skin:     General: Skin is warm and dry.   Psychiatric:         Mood and Affect: Mood normal.         Behavior: Behavior normal.   Vitals and nursing note reviewed.          ASSESSMENT & PLAN  Problem List Items Addressed This Visit          Ob-Gyn Problems    Postmenopausal bleeding - Primary    Overview     - On MHT since   - Episode of PMB  - TVUS 2024 with 5 mm EMS  - Attempted EMB in office 2024, unable complete due to stenosis  - Plan for hysteroscopy D&C in the OR, reviewed possible IUD placement at the time of the procedure; will let me know if she is interested in this  - Procedure reviewed with patient, all questions answered         Current Assessment & Plan     - Pathology benign  - Continue management of MHT with Tavon Willingham            Postoperative Status  - Pain is well controlled  - Normal bowel and bladder function  - Pathology reviewed, benign    Follow up: RACHEAL Cabrera MD  Obstetrics &  Gynecology  07/10/24

## 2024-07-11 ASSESSMENT — ENCOUNTER SYMPTOMS
RESPIRATORY NEGATIVE: 1
GASTROINTESTINAL NEGATIVE: 1
BACK PAIN: 1
ARTHRALGIAS: 1
CARDIOVASCULAR NEGATIVE: 1
PSYCHIATRIC NEGATIVE: 1
FATIGUE: 1
NEUROLOGICAL NEGATIVE: 1
NECK PAIN: 1

## 2024-07-15 DIAGNOSIS — N95.1 MENOPAUSAL SYNDROME ON HORMONE REPLACEMENT THERAPY: Primary | ICD-10-CM

## 2024-07-15 DIAGNOSIS — Z79.890 MENOPAUSAL SYNDROME ON HORMONE REPLACEMENT THERAPY: Primary | ICD-10-CM

## 2024-07-15 RX ORDER — PROGESTERONE 100 MG/1
100 CAPSULE ORAL NIGHTLY
Qty: 90 CAPSULE | Refills: 3 | Status: SHIPPED | OUTPATIENT
Start: 2024-07-15

## 2024-07-23 DIAGNOSIS — Z79.890 MENOPAUSAL SYNDROME ON HORMONE REPLACEMENT THERAPY: ICD-10-CM

## 2024-07-23 DIAGNOSIS — N95.1 MENOPAUSAL SYNDROME ON HORMONE REPLACEMENT THERAPY: ICD-10-CM

## 2024-07-24 RX ORDER — ESTRADIOL 1.53 MG/1
1 SPRAY TRANSDERMAL DAILY
Refills: 2 | OUTPATIENT
Start: 2024-07-24 | End: 2025-07-24

## 2024-08-07 ENCOUNTER — APPOINTMENT (OUTPATIENT)
Dept: NEUROLOGY | Facility: CLINIC | Age: 59
End: 2024-08-07
Payer: COMMERCIAL

## 2024-08-07 VITALS
BODY MASS INDEX: 32.44 KG/M2 | HEART RATE: 72 BPM | SYSTOLIC BLOOD PRESSURE: 120 MMHG | DIASTOLIC BLOOD PRESSURE: 74 MMHG | RESPIRATION RATE: 20 BRPM | WEIGHT: 201 LBS

## 2024-08-07 DIAGNOSIS — G43.711 CHRONIC MIGRAINE WITHOUT AURA, WITH INTRACTABLE MIGRAINE, SO STATED, WITH STATUS MIGRAINOSUS: ICD-10-CM

## 2024-08-07 DIAGNOSIS — G43.711 INTRACTABLE CHRONIC MIGRAINE WITHOUT AURA AND WITH STATUS MIGRAINOSUS: Primary | ICD-10-CM

## 2024-08-07 PROCEDURE — 64615 CHEMODENERV MUSC MIGRAINE: CPT | Performed by: PSYCHIATRY & NEUROLOGY

## 2024-08-07 RX ORDER — METHYLPREDNISOLONE 4 MG/1
TABLET ORAL
Qty: 21 TABLET | Refills: 0 | Status: SHIPPED | OUTPATIENT
Start: 2024-08-07 | End: 2024-08-14

## 2024-08-07 RX ORDER — ESTRADIOL 0.5 MG/1
1 TABLET ORAL
COMMUNITY
Start: 2023-09-27

## 2024-08-07 RX ORDER — METRONIDAZOLE 7.5 MG/G
CREAM TOPICAL
COMMUNITY
Start: 2024-05-09

## 2024-08-07 RX ORDER — LEVOTHYROXINE SODIUM 112 UG/1
1 TABLET ORAL
COMMUNITY
Start: 2024-07-02

## 2024-08-07 NOTE — PROGRESS NOTES
Patient ID: Kailyn Lopez is a 58 y.o. female.    Head/Face/Jaw Botulinum Injection    Date/Time: 8/7/2024 3:25 PM    Performed by: Evelyn Robles MD  Authorized by: Evelyn Robles MD      Consent:      Consent obtained:  Verbal     Consent given by:  Patient    Procedure details:      EMG used?  No     Electrical stimulation used?  No     Diluted by:  Preservative free saline     Toxin (Brand):  [id number]-A (Botox)     Total units available:  200       Ad hoc region injected:  Head see diagram with 200 units     Total units injected:  200     Total units wasted:  0    Post-procedure details:      Patient tolerance of procedure:  Tolerated well, no immediate complications    Comments: Please do not rub areas for 24 hours.  No pressure above eyebrows for 24 hours.  Watch out for helmets, headlamps, headbands, goggles, or massage for 24 hours.  If there is discomfort, ice for the first 24 hour,s heat after that.  Headaches may worsen, or you may experience neck stiffness.  If this occurs use your usual headache medication or a mild anti inflammatory such as advil or aleve.  Please call if you have difficulty swallowing.      Medrol ordered

## 2024-08-18 DIAGNOSIS — F41.9 ANXIETY: ICD-10-CM

## 2024-08-19 RX ORDER — CITALOPRAM 10 MG/1
10 TABLET ORAL DAILY
Qty: 90 TABLET | Refills: 1 | Status: SHIPPED | OUTPATIENT
Start: 2024-08-19

## 2024-09-16 ENCOUNTER — HOSPITAL ENCOUNTER (OUTPATIENT)
Dept: RADIOLOGY | Facility: CLINIC | Age: 59
Discharge: HOME | End: 2024-09-16
Payer: COMMERCIAL

## 2024-09-16 ENCOUNTER — APPOINTMENT (OUTPATIENT)
Dept: PHYSICAL MEDICINE AND REHAB | Facility: CLINIC | Age: 59
End: 2024-09-16
Payer: COMMERCIAL

## 2024-09-16 ENCOUNTER — APPOINTMENT (OUTPATIENT)
Dept: NEUROLOGY | Facility: CLINIC | Age: 59
End: 2024-09-16
Payer: COMMERCIAL

## 2024-09-16 DIAGNOSIS — G43.711 CHRONIC MIGRAINE WITHOUT AURA, WITH INTRACTABLE MIGRAINE, SO STATED, WITH STATUS MIGRAINOSUS: Primary | ICD-10-CM

## 2024-09-16 DIAGNOSIS — Z82.49 FAMILY HISTORY OF MI (MYOCARDIAL INFARCTION): ICD-10-CM

## 2024-09-16 DIAGNOSIS — G43.809 OTHER MIGRAINE WITHOUT STATUS MIGRAINOSUS, NOT INTRACTABLE: ICD-10-CM

## 2024-09-16 PROCEDURE — 75571 CT HRT W/O DYE W/CA TEST: CPT

## 2024-09-16 PROCEDURE — 99214 OFFICE O/P EST MOD 30 MIN: CPT | Performed by: NURSE PRACTITIONER

## 2024-09-16 RX ORDER — SUMATRIPTAN 50 MG/1
50 TABLET, FILM COATED ORAL AS NEEDED
Qty: 9 TABLET | Refills: 11 | Status: SHIPPED | OUTPATIENT
Start: 2024-09-16

## 2024-09-16 NOTE — PROGRESS NOTES
Patient being assessed today for follow-up of migraine.  She reports that there has been no significant change since last seen a year ago.  She does continue to get her Botox treatments done as recommended.  She reports that the Botox is effective in reducing her migraine activity by more than 50%.  She utilizes the Ubrelvy for abortive treatment when she is at work as the sumatriptan causes her lethargy and she only utilizes that when she is at home.  On average she is having 5-10 breakthrough migraines per month.  Typically her abortive treatments are effective with 1 dose.  We will continue the sumatriptan and Ubrelvy as she has been taking it.  Continue Botox treatments as recommended as well.  Discussed role of medicine, importance of taking medications, potential risks, benefits, and precautions to be taken.  Reviewed sleep hygiene and dietary modifications.  Follow-up in 1 year or sooner if needed.    This note was created with voice recognition software and was not corrected for typographical or grammatical errors

## 2024-10-10 ENCOUNTER — APPOINTMENT (OUTPATIENT)
Dept: OTOLARYNGOLOGY | Facility: CLINIC | Age: 59
End: 2024-10-10
Payer: COMMERCIAL

## 2024-10-14 ENCOUNTER — APPOINTMENT (OUTPATIENT)
Dept: PHYSICAL MEDICINE AND REHAB | Facility: CLINIC | Age: 59
End: 2024-10-14
Payer: COMMERCIAL

## 2024-11-06 ENCOUNTER — APPOINTMENT (OUTPATIENT)
Dept: NEUROLOGY | Facility: CLINIC | Age: 59
End: 2024-11-06
Payer: COMMERCIAL

## 2024-11-06 VITALS
HEART RATE: 72 BPM | RESPIRATION RATE: 20 BRPM | BODY MASS INDEX: 32.44 KG/M2 | WEIGHT: 201 LBS | DIASTOLIC BLOOD PRESSURE: 80 MMHG | SYSTOLIC BLOOD PRESSURE: 120 MMHG

## 2024-11-06 DIAGNOSIS — G43.711 INTRACTABLE CHRONIC MIGRAINE WITHOUT AURA AND WITH STATUS MIGRAINOSUS: ICD-10-CM

## 2024-11-06 PROCEDURE — 64615 CHEMODENERV MUSC MIGRAINE: CPT | Performed by: PSYCHIATRY & NEUROLOGY

## 2024-11-06 RX ORDER — METHYLPREDNISOLONE 4 MG/1
TABLET ORAL
COMMUNITY
Start: 2024-10-25

## 2024-11-06 RX ORDER — METHYLPREDNISOLONE 4 MG/1
TABLET ORAL
Qty: 21 TABLET | Refills: 0 | Status: CANCELLED | OUTPATIENT
Start: 2024-11-06 | End: 2024-11-13

## 2024-11-06 RX ORDER — METHYLPREDNISOLONE 4 MG/1
TABLET ORAL
Qty: 21 TABLET | Refills: 0 | Status: SHIPPED | OUTPATIENT
Start: 2024-11-06 | End: 2024-11-13

## 2024-11-06 NOTE — PROGRESS NOTES
Patient ID: Kailyn Lopez is a 58 y.o. female.    Head/Face/Jaw Botulinum Injection    Date/Time: 11/6/2024 2:53 PM    Performed by: Evelyn Robles MD  Authorized by: Evelyn Robles MD      Consent:      Consent obtained:  Verbal     Consent given by:  Patient    Procedure details:      EMG used?  No     Electrical stimulation used?  No     Diluted by:  Preservative free saline     Total units available:  200       Ad hoc region injected:  Head see diagram with 200 units     Total units injected:  200     Total units wasted:  0    Post-procedure details:      Patient tolerance of procedure:  Tolerated well, no immediate complications  Please do not rub areas for 24 hours.  No pressure above eyebrows for 24 hours.  Watch out for helmets, headlamps, headbands, goggles, or massage for 24 hours.  If there is discomfort, ice for the first 24 hour,s heat after that.  Headaches may worsen, or you may experience neck stiffness.  If this occurs use your usual headache medication or a mild anti inflammatory such as advil or aleve.  Please call if you have difficulty swallowing.    Medrol dose pack to start tonight

## 2024-11-11 ENCOUNTER — APPOINTMENT (OUTPATIENT)
Dept: PRIMARY CARE | Facility: CLINIC | Age: 59
End: 2024-11-11
Payer: COMMERCIAL

## 2024-11-13 ENCOUNTER — APPOINTMENT (OUTPATIENT)
Dept: PRIMARY CARE | Facility: CLINIC | Age: 59
End: 2024-11-13
Payer: COMMERCIAL

## 2024-11-18 ENCOUNTER — APPOINTMENT (OUTPATIENT)
Dept: PRIMARY CARE | Facility: CLINIC | Age: 59
End: 2024-11-18
Payer: COMMERCIAL

## 2024-11-18 DIAGNOSIS — J45.20 INTERMITTENT ASTHMA WITHOUT COMPLICATION, UNSPECIFIED ASTHMA SEVERITY (HHS-HCC): ICD-10-CM

## 2024-11-19 RX ORDER — ALBUTEROL SULFATE 90 UG/1
1-2 INHALANT RESPIRATORY (INHALATION) EVERY 4 HOURS PRN
Qty: 8.5 G | Refills: 3 | Status: SHIPPED | OUTPATIENT
Start: 2024-11-19

## 2024-12-02 ENCOUNTER — OFFICE VISIT (OUTPATIENT)
Dept: PRIMARY CARE | Facility: CLINIC | Age: 59
End: 2024-12-02
Payer: COMMERCIAL

## 2024-12-02 VITALS
HEIGHT: 66 IN | OXYGEN SATURATION: 98 % | SYSTOLIC BLOOD PRESSURE: 121 MMHG | DIASTOLIC BLOOD PRESSURE: 83 MMHG | WEIGHT: 211.5 LBS | HEART RATE: 83 BPM | BODY MASS INDEX: 33.99 KG/M2

## 2024-12-02 DIAGNOSIS — E66.09 CLASS 1 OBESITY DUE TO EXCESS CALORIES WITH BODY MASS INDEX (BMI) OF 34.0 TO 34.9 IN ADULT, UNSPECIFIED WHETHER SERIOUS COMORBIDITY PRESENT: Chronic | ICD-10-CM

## 2024-12-02 DIAGNOSIS — N95.1 MENOPAUSE SYNDROME: Chronic | ICD-10-CM

## 2024-12-02 DIAGNOSIS — E78.2 MIXED HYPERLIPIDEMIA: ICD-10-CM

## 2024-12-02 DIAGNOSIS — R73.03 PREDIABETES: Primary | Chronic | ICD-10-CM

## 2024-12-02 DIAGNOSIS — E66.811 CLASS 1 OBESITY DUE TO EXCESS CALORIES WITH BODY MASS INDEX (BMI) OF 34.0 TO 34.9 IN ADULT, UNSPECIFIED WHETHER SERIOUS COMORBIDITY PRESENT: Chronic | ICD-10-CM

## 2024-12-02 DIAGNOSIS — N95.1 HOT FLASHES, MENOPAUSAL: Chronic | ICD-10-CM

## 2024-12-02 LAB — HBA1C MFR BLD: 5.9 % (ref 4.2–6.5)

## 2024-12-02 PROCEDURE — 3008F BODY MASS INDEX DOCD: CPT | Performed by: STUDENT IN AN ORGANIZED HEALTH CARE EDUCATION/TRAINING PROGRAM

## 2024-12-02 PROCEDURE — 99214 OFFICE O/P EST MOD 30 MIN: CPT | Performed by: STUDENT IN AN ORGANIZED HEALTH CARE EDUCATION/TRAINING PROGRAM

## 2024-12-02 PROCEDURE — 1036F TOBACCO NON-USER: CPT | Performed by: STUDENT IN AN ORGANIZED HEALTH CARE EDUCATION/TRAINING PROGRAM

## 2024-12-02 PROCEDURE — 83036 HEMOGLOBIN GLYCOSYLATED A1C: CPT | Mod: CLIA WAIVED TEST | Performed by: STUDENT IN AN ORGANIZED HEALTH CARE EDUCATION/TRAINING PROGRAM

## 2024-12-02 ASSESSMENT — ENCOUNTER SYMPTOMS
UNEXPECTED WEIGHT CHANGE: 1
GASTROINTESTINAL NEGATIVE: 1
SLEEP DISTURBANCE: 1
ARTHRALGIAS: 1
FATIGUE: 1
CARDIOVASCULAR NEGATIVE: 1
LOSS OF SENSATION IN FEET: 0
DEPRESSION: 0
NEUROLOGICAL NEGATIVE: 1
RESPIRATORY NEGATIVE: 1
OCCASIONAL FEELINGS OF UNSTEADINESS: 0

## 2024-12-02 ASSESSMENT — PATIENT HEALTH QUESTIONNAIRE - PHQ9
1. LITTLE INTEREST OR PLEASURE IN DOING THINGS: NOT AT ALL
2. FEELING DOWN, DEPRESSED OR HOPELESS: NOT AT ALL
SUM OF ALL RESPONSES TO PHQ9 QUESTIONS 1 AND 2: 0

## 2024-12-02 ASSESSMENT — PAIN SCALES - GENERAL: PAINLEVEL_OUTOF10: 0-NO PAIN

## 2024-12-02 NOTE — PROGRESS NOTES
Subjective   Patient ID: Kailyn Lopez is a 59 y.o. female who presents for Diabetes (a1c).  Last A1c 5.5%. Has been having a hard time with her weight and diet.     States she overate over Thanksgiving. Not exercising. States having more sugar cravings. Interested in starting ozempic again. Did well with this previously.     Is now on estrogen spray once daily instead of oral. This was changed about 3 months ago.     Reporting daily am drenching hot sweats. Not sure if this worsened with the change to the mist. Follows with gyn for this.     No other concerns today.         Review of Systems   Constitutional:  Positive for fatigue and unexpected weight change.   Respiratory: Negative.     Cardiovascular: Negative.    Gastrointestinal: Negative.    Endocrine: Positive for heat intolerance.   Musculoskeletal:  Positive for arthralgias.   Neurological: Negative.    Psychiatric/Behavioral:  Positive for sleep disturbance.    All other systems reviewed and are negative.      Objective   Physical Exam  Vitals reviewed.   Constitutional:       General: She is not in acute distress.  Cardiovascular:      Rate and Rhythm: Normal rate and regular rhythm.   Pulmonary:      Effort: Pulmonary effort is normal. No respiratory distress.   Musculoskeletal:         General: Normal range of motion.   Skin:     General: Skin is warm and dry.   Neurological:      Mental Status: She is alert. Mental status is at baseline.   Psychiatric:         Mood and Affect: Mood normal.         Behavior: Behavior normal.         Body mass index is 34.14 kg/m².      Current Outpatient Medications:     acetaminophen (Tylenol) 325 mg tablet, Take by mouth every 6 hours if needed for mild pain (1 - 3)., Disp: , Rfl:     albuterol 90 mcg/actuation inhaler, INHALE 1-2 PUFFS EVERY 4 HOURS IF NEEDED FOR WHEEZING. EVERY 4 TO 6 HOURS AS NEEDED., Disp: 8.5 g, Rfl: 3    busPIRone (Buspar) 5 mg tablet, Take 2 tablets (10 mg) by mouth once daily as needed  (anxiety)., Disp: 60 tablet, Rfl: 2    carbinoxamine maleate 4 mg tablet, Take by mouth., Disp: , Rfl:     cholecalciferol (Vitamin D-3) 125 MCG (5000 UT) capsule, Take 1 capsule (125 mcg) by mouth once daily., Disp: , Rfl:     citalopram (CeleXA) 10 mg tablet, TAKE 1 TABLET BY MOUTH EVERY DAY, Disp: 90 tablet, Rfl: 1    citalopram (CeleXA) 20 mg tablet, TAKE 1 TABLET BY MOUTH EVERY DAY, Disp: 90 tablet, Rfl: 1    doxycycline (Oracea) 40 mg DR capsule, Take 1 capsule (40 mg) by mouth once daily in the morning. Do not crush or chew. Take with a full glass of water and do not lie down for at least 30 minutes after., Disp: , Rfl:     estradiol (Evamist) 1.53 mg/spray (1.7%) transdermal spray, Place 1 spray on the skin once daily., Disp: 8.1 mL, Rfl: 3    famotidine (Pepcid) 20 mg tablet, Take 1 tablet (20 mg) by mouth once daily., Disp: , Rfl:     fexofenadine (Allegra) 60 mg tablet, Take by mouth once daily., Disp: , Rfl:     gabapentin (Neurontin) 100 mg capsule, Take 1 capsule (100 mg) by mouth 3 times a day for 7 days, THEN 2 capsules (200 mg) 3 times a day for 7 days, THEN 3 capsules (300 mg) 3 times a day., Disp: 333 capsule, Rfl: 0    levothyroxine (Synthroid, Levoxyl) 100 mcg tablet, Take 1 tablet (100 mcg) by mouth once daily., Disp: , Rfl:     liothyronine (Cytomel) 5 mcg tablet, Take 0.5 tablets (2.5 mcg) by mouth once daily., Disp: , Rfl:     metFORMIN  mg 24 hr tablet, Take 1 tablet (500 mg) by mouth once daily in the evening. Take with meals. Do not crush, chew, or split, Disp: 90 tablet, Rfl: 1    methylPREDNISolone (Medrol Dospak) 4 mg tablets, TAKE 6 TABLETS ON DAY 1 AS DIRECTED ON PACKAGE AND DECREASE BY 1 TAB EACH DAY FOR A TOTAL OF 6 DAYS, Disp: , Rfl:     metroNIDAZOLE (Metrocream) 0.75 % cream, TAKE 1(ONE) APPLICATION(S) TOPICALLY TO FACE TWICE A DAY EVERY DAY., Disp: , Rfl:     mometasone furoate, bulk, 100 % powder, Compound 1 mg capsule to be added to sinus rinse twice daily., Disp: 180  g, Rfl: 3    olopatadine (Patanase) 0.6 % spray,non-aerosol nasal spray, SPRAY 2 SPRAYS INTO AFFECTED NOSTRIL(S) TWICE A DAY, Disp: 91.5 g, Rfl: 1    omeprazole (PriLOSEC) 40 mg DR capsule, Take 1 capsule (40 mg) by mouth once daily as needed. IN THE MORNING. 60 min BEFORE a MEAL, Disp: , Rfl:     progesterone (Prometrium) 100 mg capsule, Take 1 capsule (100 mg) by mouth once daily at bedtime., Disp: 90 capsule, Rfl: 3    semaglutide (Ozempic) 0.25 mg or 0.5 mg(2 mg/1.5 mL) pen injector, Inject 0.5mg weekly, Disp: 3 mL, Rfl: 2    semaglutide 0.25 mg or 0.5 mg (2 mg/3 mL) pen injector, Inject 0.25 mg under the skin 1 (one) time per week., Disp: 3 mL, Rfl: 1    SUMAtriptan (Imitrex) 50 mg tablet, Take 1 tablet (50 mg) by mouth if needed for migraine for up to 1 dose. FOR MIGRAINE RELIEF. MAY REPEAT EVERY 2 HOURS MAXIMUM  MG PER DAY, Disp: 9 tablet, Rfl: 11    Synthroid 112 mcg tablet, Take 1 tablet (112 mcg) by mouth early in the morning.., Disp: , Rfl:     ubrogepant (Ubrelvy) 100 mg tablet tablet, Take 1 tablet (100 mg) by mouth if needed (At onset of migraine.  Okay to repeat in 2 hours if needed.)., Disp: 16 tablet, Rfl: 11      Assessment/Plan   Diagnoses and all orders for this visit:  Prediabetes  Comments:  a1c increased from 5.5 to 5.9  Orders:  -     semaglutide 0.25 mg or 0.5 mg (2 mg/3 mL) pen injector; Inject 0.25 mg under the skin 1 (one) time per week.  -     Comprehensive metabolic panel; Future  Class 1 obesity due to excess calories with body mass index (BMI) of 34.0 to 34.9 in adult, unspecified whether serious comorbidity present  Comments:  encouraged increasing physical activity and working on diet  Menopause syndrome  Comments:  seeing gyn  recommended discussing with her gyn about increasing dose of evamist  Hot flashes, menopausal  Comments:  follow up with gyn  consider trying higher dose  Mixed hyperlipidemia  -     Lipid panel; Future  Other orders  -     POCT GLYCOSYLATED HEMOGLOBIN  (HGB A1C)    Follow up in 3 months       Oneyda Rosado,  12/02/24 6:46 PM

## 2024-12-02 NOTE — PROGRESS NOTES
Subjective   Patient ID: Kailyn Lopez is a 59 y.o. female who presents for No chief complaint on file..    HPI  Review of Systems  Physical Exam    Objective     No diagnosis found.   Patient Active Problem List   Diagnosis    Allergic rhinitis    Angioedema    Anxiety    Arthralgia of left elbow    Asymmetric SNHL (sensorineural hearing loss)    Hui's esophagus without dysplasia    Benign paroxysmal positional vertigo of left ear    Chronic rhinitis    Chronic migraine without aura, with intractable migraine, so stated, with status migrainosus    Deviated septum    Dizziness    Ear popping    Ear pressure, bilateral    Facial pressure    Facial pain    Hot flashes, menopausal    GERD (gastroesophageal reflux disease)    Habitual snoring    Hypothyroidism    Insomnia    Lateral epicondylitis of left elbow    Mechanical low back pain    Medial epicondylitis of left elbow    Nasal congestion with rhinorrhea    Migraine    Nasal drainage    Class 2 obesity with body mass index (BMI) of 38.0 to 38.9 in adult    Neck pain on left side    Obstructive sleep apnea (adult) (pediatric)    Oromandibular dystonia    Perimenopausal    Postnasal drip    Segmental and somatic dysfunction of lumbar region    TMJ pain dysfunction syndrome    Urticaria, idiopathic    Vitamin D deficiency    Vertigo    Insulin resistance    Prediabetes    Right hip pain    Sacroiliac inflammation (CMS-HCC)    Trochanteric bursitis of right hip    Fibromyalgia    Myofascial pain    Cervical spondylosis    Difficulty breathing    Nasal valve collapse    Hypertrophy of nasal turbinates    Intermittent asthma without complication, unspecified asthma severity (HHS-HCC)    Achilles tendinosis of right lower extremity    Hypothyroidism due to Hashimoto's thyroiditis    Hot flashes    History of nutritional disorder    History of adenomatous polyp of colon    Depression    Irritable bowel syndrome with predominant constipation    Bloating    Arthritis of  right subtalar joint    Ankle pain    KRISH positive    Postmenopausal bleeding    Acquired deformity of nose    Deviated nasal septum      Allergies   Allergen Reactions    Nsaids (Non-Steroidal Anti-Inflammatory Drug) Swelling    Ace Inhibitors Unknown    Animal Dander Unknown    Aspirin Hives    Cat Dander Unknown    Drospirenone-E.Estradiol-Lm.Fa Other    Estrogens Unknown    Grass Pollen Other    Nabumetone Unknown    Other Unknown     Pollen, ragweet, dust mites    Ragweed Unknown    Tree And Shrub Pollen Unknown      Medication Documentation Review Audit       Reviewed by Barbara Rahman RN (Registered Nurse) on 11/06/24 at 0820      Medication Order Taking? Sig Documenting Provider Last Dose Status   acetaminophen (Tylenol) 325 mg tablet [id number] Yes Take by mouth every 6 hours if needed for mild pain (1 - 3). Historical Provider, MD  Active   albuterol 90 mcg/actuation inhaler [id number] Yes Inhale 1-2 puffs every 4 hours if needed for wheezing. EVERY 4 TO 6 HOURS AS NEEDED. Oneyda Rosado, DO  Active   busPIRone (Buspar) 5 mg tablet [id number] Yes Take 2 tablets (10 mg) by mouth once daily as needed (anxiety). Oneyda Rosado, DO  Active   carbinoxamine maleate 4 mg tablet [id number] Yes Take by mouth. Historical Provider, MD  Active   cholecalciferol (Vitamin D-3) 125 MCG (5000 UT) capsule [id number] Yes Take 1 capsule (125 mcg) by mouth once daily. Historical Provider, MD  Active   citalopram (CeleXA) 10 mg tablet [id number] Yes TAKE 1 TABLET BY MOUTH EVERY DAY Oneyda Rosado DO  Active   citalopram (CeleXA) 20 mg tablet [id number] Yes TAKE 1 TABLET BY MOUTH EVERY DAY Oneyda Rosado DO  Active   doxycycline (Oracea) 40 mg DR capsule [id number] Yes Take 1 capsule (40 mg) by mouth once daily in the morning. Do not crush or chew. Take with a full glass of water and do not lie down for at least 30 minutes after. Historical Provider, MD  Active   estradiol (Estrace) 0.5 mg tablet [id number] Yes Take 1 tablet (0.5  mg) by mouth early in the morning.. Historical Provider, MD  Active   estradiol (Evamist) 1.53 mg/spray (1.7%) transdermal spray [id number] Yes Place 1 spray on the skin once daily. Tavon Willingham APRN-CNP  Active   famotidine (Pepcid) 20 mg tablet [id number] Yes Take 1 tablet (20 mg) by mouth once daily. Historical Provider, MD  Active   fexofenadine (Allegra) 60 mg tablet [id number] Yes Take by mouth once daily. Historical Provider, MD  Active   gabapentin (Neurontin) 100 mg capsule [id number]  Take 1 capsule (100 mg) by mouth 3 times a day for 7 days, THEN 2 capsules (200 mg) 3 times a day for 7 days, THEN 3 capsules (300 mg) 3 times a day. Bailey Stauffer MD   24   levothyroxine (Synthroid, Levoxyl) 100 mcg tablet [id number] Yes Take 1 tablet (100 mcg) by mouth once daily. Historical Provider, MD  Active   liothyronine (Cytomel) 5 mcg tablet [id number] Yes Take 0.5 tablets (2.5 mcg) by mouth once daily. Historical Provider, MD  Active   metFORMIN  mg 24 hr tablet [id number] Yes Take 1 tablet (500 mg) by mouth once daily in the evening. Take with meals. Do not crush, chew, or split Oneyda Rosado, DO  Active   methylPREDNISolone (Medrol Dospak) 4 mg tablets [id number] Yes TAKE 6 TABLETS ON DAY 1 AS DIRECTED ON PACKAGE AND DECREASE BY 1 TAB EACH DAY FOR A TOTAL OF 6 DAYS Historical Provider, MD  Active   metroNIDAZOLE (Metrocream) 0.75 % cream [id number] Yes TAKE 1(ONE) APPLICATION(S) TOPICALLY TO FACE TWICE A DAY EVERY DAY. Historical Provider, MD  Active   mometasone furoate, bulk, 100 % powder [id number] Yes Compound 1 mg capsule to be added to sinus rinse twice daily. Mick Fleming MD  Active   olopatadine (Patanase) 0.6 % spray,non-aerosol nasal spray [id number] Yes SPRAY 2 SPRAYS INTO AFFECTED NOSTRIL(S) TWICE A DAY Mick Fleming MD  Active   omeprazole (PriLOSEC) 40 mg DR capsule [id number] Yes Take 1 capsule (40 mg) by mouth once daily as needed. IN THE MORNING. 60 min BEFORE a MEAL  Historical Provider, MD  Active   progesterone (Prometrium) 100 mg capsule [id number] Yes Take 1 capsule (100 mg) by mouth once daily at bedtime. MIKAELA Vivas  Active   semaglutide (Ozempic) 0.25 mg or 0.5 mg(2 mg/1.5 mL) pen injector [id number] Yes Inject 0.5mg weekly Oneyda Rosado DO  Active   SUMAtriptan (Imitrex) 50 mg tablet [id number] Yes Take 1 tablet (50 mg) by mouth if needed for migraine for up to 1 dose. FOR MIGRAINE RELIEF. MAY REPEAT EVERY 2 HOURS MAXIMUM  MG PER DAY MIKAELA Diego  Active   Synthroid 112 mcg tablet [id number] Yes Take 1 tablet (112 mcg) by mouth early in the morning.. Historical Provider, MD  Active   ubrogepant (Ubrelvy) 100 mg tablet tablet [id number] Yes Take 1 tablet (100 mg) by mouth if needed (At onset of migraine.  Okay to repeat in 2 hours if needed.). MIKAELA Diego  Active                    Past Medical History:   Diagnosis Date    Adjustment insomnia 05/07/2021    Adjustment insomnia    Anxiety 1986    Benign paroxysmal vertigo, unspecified ear 03/29/2021    Vertigo, benign positional    Epigastric pain 07/19/2021    Dyspepsia    Fibromyalgia, primary 2023    Headache 1990    Headache, tension-type 1990    Menopausal and female climacteric states 07/30/2021    Perimenopausal    Migraine 2015    Other specified postprocedural states     History of Papanicolaou smear    Personal history of other diseases of the musculoskeletal system and connective tissue 03/31/2021    History of neck pain    Personal history of other medical treatment 05/28/2020    History of screening mammography    Personal history of other medical treatment     History of mammogram    Personal history of other specified conditions 06/03/2021    History of headache    Rash and other nonspecific skin eruption 07/31/2015    Rash    Sleep apnea 2020     Social History     Tobacco Use   Smoking Status Former    Current packs/day: 0.00    Average packs/day: 0.3 packs/day for  2.0 years (0.5 ttl pk-yrs)    Types: Cigarettes    Start date: 1990    Quit date: 1992    Years since quittin.9   Smokeless Tobacco Never   Tobacco Comments    Quit 30 yrs ago     Family History   Problem Relation Name Age of Onset    Arthritis Mother Courtney     Migraines Mother Courtney     Allergies Father Cassius     Coronary artery disease Father Cassius     Depression Father Cassius     Allergies Brother Kel     Migraines Brother Kel     Depression Brother Kel     Arthritis Maternal Grandmother Kathy     Heart failure Maternal Grandmother Kathy     Migraines Maternal Grandmother Kathy     Stroke Maternal Grandmother Kathy     Arthritis Paternal Grandmother      Coronary artery disease Paternal Grandfather        Past Surgical History:   Procedure Laterality Date    OTHER SURGICAL HISTORY  2020    Esophagogastroduodenoscopy    OTHER SURGICAL HISTORY  2020    Nasal septoplasty    OTHER SURGICAL HISTORY  2020    Colonoscopy    OTHER SURGICAL HISTORY Right     peroneal tendon repair       Assessment/Plan         JOSE MA MA

## 2024-12-03 ENCOUNTER — APPOINTMENT (OUTPATIENT)
Dept: PRIMARY CARE | Facility: CLINIC | Age: 59
End: 2024-12-03
Payer: COMMERCIAL

## 2024-12-04 PROCEDURE — 88305 TISSUE EXAM BY PATHOLOGIST: CPT | Performed by: DERMATOLOGY

## 2024-12-05 ENCOUNTER — LAB REQUISITION (OUTPATIENT)
Dept: DERMATOPATHOLOGY | Facility: CLINIC | Age: 59
End: 2024-12-05
Payer: COMMERCIAL

## 2024-12-05 DIAGNOSIS — L98.9 DISORDER OF THE SKIN AND SUBCUTANEOUS TISSUE, UNSPECIFIED: ICD-10-CM

## 2024-12-06 LAB
LABORATORY COMMENT REPORT: NORMAL
PATH REPORT.FINAL DX SPEC: NORMAL
PATH REPORT.GROSS SPEC: NORMAL
PATH REPORT.MICROSCOPIC SPEC OTHER STN: NORMAL
PATH REPORT.RELEVANT HX SPEC: NORMAL
PATH REPORT.TOTAL CANCER: NORMAL

## 2024-12-07 DIAGNOSIS — R09.81 NASAL CONGESTION WITH RHINORRHEA: ICD-10-CM

## 2024-12-07 DIAGNOSIS — J34.89 NASAL CONGESTION WITH RHINORRHEA: ICD-10-CM

## 2024-12-09 RX ORDER — OLOPATADINE HYDROCHLORIDE 665 UG/1
SPRAY NASAL
Qty: 30.5 G | Refills: 11 | Status: SHIPPED | OUTPATIENT
Start: 2024-12-09

## 2024-12-10 ENCOUNTER — APPOINTMENT (OUTPATIENT)
Dept: PRIMARY CARE | Facility: CLINIC | Age: 59
End: 2024-12-10
Payer: COMMERCIAL

## 2024-12-10 NOTE — PROGRESS NOTES
Pt having a worsening of VMS, had a consult in endocrinology and her thyroid medication was adjusted but worsening VMS has continued; she will try 2 sprays of Evamist rather than 1 spray

## 2024-12-16 ENCOUNTER — APPOINTMENT (OUTPATIENT)
Dept: PRIMARY CARE | Facility: CLINIC | Age: 59
End: 2024-12-16
Payer: COMMERCIAL

## 2024-12-20 DIAGNOSIS — E66.09 CLASS 1 OBESITY DUE TO EXCESS CALORIES WITH BODY MASS INDEX (BMI) OF 34.0 TO 34.9 IN ADULT, UNSPECIFIED WHETHER SERIOUS COMORBIDITY PRESENT: ICD-10-CM

## 2024-12-20 DIAGNOSIS — E66.811 CLASS 1 OBESITY DUE TO EXCESS CALORIES WITH BODY MASS INDEX (BMI) OF 34.0 TO 34.9 IN ADULT, UNSPECIFIED WHETHER SERIOUS COMORBIDITY PRESENT: ICD-10-CM

## 2024-12-20 RX ORDER — METFORMIN HYDROCHLORIDE 500 MG/1
500 TABLET, EXTENDED RELEASE ORAL
Qty: 90 TABLET | Refills: 1 | Status: SHIPPED | OUTPATIENT
Start: 2024-12-20

## 2025-01-02 DIAGNOSIS — Z79.890 MENOPAUSAL SYNDROME ON HORMONE REPLACEMENT THERAPY: ICD-10-CM

## 2025-01-02 DIAGNOSIS — N95.1 MENOPAUSAL SYNDROME ON HORMONE REPLACEMENT THERAPY: ICD-10-CM

## 2025-01-02 RX ORDER — ESTRADIOL 1.53 MG/1
2 SPRAY TRANSDERMAL DAILY
Qty: 8.1 ML | Refills: 11 | Status: SHIPPED | OUTPATIENT
Start: 2025-01-02 | End: 2026-01-02

## 2025-01-08 ENCOUNTER — APPOINTMENT (OUTPATIENT)
Dept: ORTHOPEDIC SURGERY | Facility: CLINIC | Age: 60
End: 2025-01-08
Payer: COMMERCIAL

## 2025-01-08 DIAGNOSIS — R20.0 NUMBNESS OF FOOT: Primary | ICD-10-CM

## 2025-01-08 PROCEDURE — 99203 OFFICE O/P NEW LOW 30 MIN: CPT | Performed by: ORTHOPAEDIC SURGERY

## 2025-01-08 ASSESSMENT — PAIN SCALES - GENERAL: PAINLEVEL_OUTOF10: 2

## 2025-01-08 ASSESSMENT — PAIN DESCRIPTION - DESCRIPTORS: DESCRIPTORS: TENDER

## 2025-01-08 ASSESSMENT — PAIN - FUNCTIONAL ASSESSMENT: PAIN_FUNCTIONAL_ASSESSMENT: 0-10

## 2025-01-08 NOTE — PROGRESS NOTES
Pleasant 59-year-old woman self-referred.  I had previously seen her mom.    She has chronic numbness in the dorsal aspect of her right foot.  She had a peroneal tendon injury that was treated surgically.  Subsequent to this she has had varying degrees of numbness.  She has occasional symptoms in her right calf.  No severe back pain.  No radiating buttock or posterior thigh symptoms.    She saw Dr. Garcia who recommended surgery.    No constitutional symptoms.    Family, social, and medical histories are obtained and reviewed.    30-point, patient-recorded Review of Systems is personally obtained and reviewed. Inclusive is no history of weight loss, change in appetite, recent change in activity level, change in bowel or bladder habits, fevers, chills, malaise, or night pain.    Healthy-appearing appearing person no acute distress. Stable gait. Posture is upright. Painless flexion and extension of the lumbar spine. Painless motion in both hips. Strength intact in the lower extremities without pathologic reflexes. Palpable dorsalis pedis pulses.    Her lumbar MRI shows mild degenerative change but no high-grade neural compression.    She has primarily localized numbness in her foot.  She does not have severe radicular pain or weakness.  Her MRI is relatively unremarkable.  We have discussed her situation at length.  We reviewed her imaging.  I would recommend a nonsurgical approach.  She has worked with Dr. Lemus in the past and I think that would be a reasonable neck step for her to continue.  Certainly if her symptoms were to worsen in any severity or if she develop new symptoms, further evaluation would be indicated.    ** Dictated with voice recognition software and not immediately reviewed for errors in grammar and/or spelling **

## 2025-01-08 NOTE — LETTER
January 8, 2025     Oneyda Rosado DO  [address] [facility] [address]    Patient: Kailyn Lopez   YOB: 1965   Date of Visit: 1/8/2025       Dear Dr. Oneyda Rosado DO:    Thank you for referring Kailyn Lopez to me for evaluation. Below are my notes for this consultation.  If you have questions, please do not hesitate to call me. I look forward to following your patient along with you.       Sincerely,     Roger Sarabia MD      CC: No Recipients  ______________________________________________________________________________________    Pleasant 59-year-old woman self-referred.  I had previously seen her mom.    She has chronic numbness in the dorsal aspect of her right foot.  She had a peroneal tendon injury that was treated surgically.  Subsequent to this she has had varying degrees of numbness.  She has occasional symptoms in her right calf.  No severe back pain.  No radiating buttock or posterior thigh symptoms.    She saw Dr. Garcia who recommended surgery.    No constitutional symptoms.    Family, social, and medical histories are obtained and reviewed.    30-point, patient-recorded Review of Systems is personally obtained and reviewed. Inclusive is no history of weight loss, change in appetite, recent change in activity level, change in bowel or bladder habits, fevers, chills, malaise, or night pain.    Healthy-appearing appearing person no acute distress. Stable gait. Posture is upright. Painless flexion and extension of the lumbar spine. Painless motion in both hips. Strength intact in the lower extremities without pathologic reflexes. Palpable dorsalis pedis pulses.    Her lumbar MRI shows mild degenerative change but no high-grade neural compression.    She has primarily localized numbness in her foot.  She does not have severe radicular pain or weakness.  Her MRI is relatively unremarkable.  We have discussed her situation at length.  We reviewed her imaging.  I would recommend a  nonsurgical approach.  She has worked with Dr. Lemus in the past and I think that would be a reasonable neck step for her to continue.  Certainly if her symptoms were to worsen in any severity or if she develop new symptoms, further evaluation would be indicated.    ** Dictated with voice recognition software and not immediately reviewed for errors in grammar and/or spelling **

## 2025-02-10 ENCOUNTER — APPOINTMENT (OUTPATIENT)
Dept: NEUROLOGY | Facility: CLINIC | Age: 60
End: 2025-02-10
Payer: COMMERCIAL

## 2025-02-17 ENCOUNTER — APPOINTMENT (OUTPATIENT)
Dept: NEUROLOGY | Facility: CLINIC | Age: 60
End: 2025-02-17
Payer: COMMERCIAL

## 2025-02-19 ENCOUNTER — PROCEDURE VISIT (OUTPATIENT)
Dept: NEUROLOGY | Facility: CLINIC | Age: 60
End: 2025-02-19
Payer: COMMERCIAL

## 2025-02-19 VITALS
RESPIRATION RATE: 20 BRPM | BODY MASS INDEX: 32.28 KG/M2 | WEIGHT: 200 LBS | HEART RATE: 72 BPM | DIASTOLIC BLOOD PRESSURE: 66 MMHG | SYSTOLIC BLOOD PRESSURE: 100 MMHG

## 2025-02-19 DIAGNOSIS — G43.711 INTRACTABLE CHRONIC MIGRAINE WITHOUT AURA AND WITH STATUS MIGRAINOSUS: ICD-10-CM

## 2025-02-19 PROCEDURE — 64615 CHEMODENERV MUSC MIGRAINE: CPT | Performed by: PSYCHIATRY & NEUROLOGY

## 2025-02-19 RX ORDER — METHYLPREDNISOLONE 4 MG/1
TABLET ORAL
Qty: 21 TABLET | Refills: 0 | Status: SHIPPED | OUTPATIENT
Start: 2025-02-19

## 2025-02-19 NOTE — PROGRESS NOTES
Patient ID: Kailyn Lopez is a 59 y.o. female.    Head/Face/Jaw Botulinum Injection    Date/Time: 2/19/2025 5:24 PM    Performed by: Evelyn Robles MD  Authorized by: Evelyn Robles MD      Consent:      Consent obtained:  Verbal     Consent given by:  Patient    Procedure details:      EMG used?  No     Electrical stimulation used?  No     Diluted by:  Preservative free saline     Medications: 100 Units onabotulinumtoxinA 100 unit; 100 Units onabotulinumtoxinA 100 unit      Total units available:  200       Ad hoc region injected:  Head see diagram with 200 units     Total units injected:  200     Total units wasted:  0    Post-procedure details:      Patient tolerance of procedure:  Tolerated well, no immediate complications    Comments: Please do not rub areas for 24 hours.  No pressure above eyebrows for 24 hours.  Watch out for helmets, headlamps, headbands, goggles, or massage for 24 hours.  If there is discomfort, ice for the first 24 hour,s heat after that.  Headaches may worsen, or you may experience neck stiffness.  If this occurs use your usual headache medication or a mild anti inflammatory such as advil or aleve.  Please call if you have difficulty swallowing.

## 2025-03-07 DIAGNOSIS — R73.03 PREDIABETES: Chronic | ICD-10-CM

## 2025-03-07 RX ORDER — SEMAGLUTIDE 0.68 MG/ML
0.25 INJECTION, SOLUTION SUBCUTANEOUS
Qty: 3 ML | Refills: 1 | Status: SHIPPED | OUTPATIENT
Start: 2025-03-09

## 2025-03-18 ENCOUNTER — APPOINTMENT (OUTPATIENT)
Dept: NEUROLOGY | Facility: CLINIC | Age: 60
End: 2025-03-18
Payer: COMMERCIAL

## 2025-03-26 ENCOUNTER — APPOINTMENT (OUTPATIENT)
Dept: ORTHOPEDIC SURGERY | Facility: CLINIC | Age: 60
End: 2025-03-26
Payer: COMMERCIAL

## 2025-03-27 ENCOUNTER — PATIENT MESSAGE (OUTPATIENT)
Dept: PRIMARY CARE | Facility: CLINIC | Age: 60
End: 2025-03-27
Payer: COMMERCIAL

## 2025-03-27 DIAGNOSIS — E11.9 TYPE 2 DIABETES MELLITUS WITHOUT COMPLICATION, WITHOUT LONG-TERM CURRENT USE OF INSULIN: Primary | ICD-10-CM

## 2025-04-16 ENCOUNTER — APPOINTMENT (OUTPATIENT)
Dept: ORTHOPEDIC SURGERY | Facility: CLINIC | Age: 60
End: 2025-04-16
Payer: COMMERCIAL

## 2025-04-26 DIAGNOSIS — F41.9 ANXIETY: ICD-10-CM

## 2025-04-27 ENCOUNTER — PATIENT MESSAGE (OUTPATIENT)
Dept: PRIMARY CARE | Facility: CLINIC | Age: 60
End: 2025-04-27
Payer: COMMERCIAL

## 2025-04-27 DIAGNOSIS — Z12.31 BREAST CANCER SCREENING BY MAMMOGRAM: Primary | ICD-10-CM

## 2025-04-28 RX ORDER — CITALOPRAM 10 MG/1
10 TABLET ORAL DAILY
Qty: 90 TABLET | Refills: 1 | Status: SHIPPED | OUTPATIENT
Start: 2025-04-28

## 2025-04-28 RX ORDER — CITALOPRAM 20 MG/1
20 TABLET, FILM COATED ORAL DAILY
Qty: 90 TABLET | Refills: 1 | Status: SHIPPED | OUTPATIENT
Start: 2025-04-28

## 2025-05-02 ENCOUNTER — PATIENT MESSAGE (OUTPATIENT)
Dept: PRIMARY CARE | Facility: CLINIC | Age: 60
End: 2025-05-02
Payer: COMMERCIAL

## 2025-05-02 DIAGNOSIS — E11.9 TYPE 2 DIABETES MELLITUS WITHOUT COMPLICATION, WITHOUT LONG-TERM CURRENT USE OF INSULIN: ICD-10-CM

## 2025-05-07 ENCOUNTER — APPOINTMENT (OUTPATIENT)
Dept: RADIOLOGY | Facility: CLINIC | Age: 60
End: 2025-05-07
Payer: COMMERCIAL

## 2025-05-09 ENCOUNTER — APPOINTMENT (OUTPATIENT)
Dept: RADIOLOGY | Facility: CLINIC | Age: 60
End: 2025-05-09
Payer: COMMERCIAL

## 2025-05-09 DIAGNOSIS — Z12.31 BREAST CANCER SCREENING BY MAMMOGRAM: ICD-10-CM

## 2025-05-09 PROCEDURE — 77067 SCR MAMMO BI INCL CAD: CPT | Performed by: RADIOLOGY

## 2025-05-09 PROCEDURE — 77063 BREAST TOMOSYNTHESIS BI: CPT

## 2025-05-09 PROCEDURE — 77063 BREAST TOMOSYNTHESIS BI: CPT | Performed by: RADIOLOGY

## 2025-05-21 ENCOUNTER — APPOINTMENT (OUTPATIENT)
Dept: NEUROLOGY | Facility: CLINIC | Age: 60
End: 2025-05-21
Payer: COMMERCIAL

## 2025-05-21 ENCOUNTER — APPOINTMENT (OUTPATIENT)
Dept: RADIOLOGY | Facility: CLINIC | Age: 60
End: 2025-05-21
Payer: COMMERCIAL

## 2025-05-28 ENCOUNTER — APPOINTMENT (OUTPATIENT)
Dept: NEUROLOGY | Facility: CLINIC | Age: 60
End: 2025-05-28
Payer: COMMERCIAL

## 2025-05-28 VITALS
WEIGHT: 200 LBS | HEART RATE: 68 BPM | DIASTOLIC BLOOD PRESSURE: 70 MMHG | RESPIRATION RATE: 20 BRPM | BODY MASS INDEX: 32.28 KG/M2 | SYSTOLIC BLOOD PRESSURE: 110 MMHG

## 2025-05-28 DIAGNOSIS — G43.711 INTRACTABLE CHRONIC MIGRAINE WITHOUT AURA AND WITH STATUS MIGRAINOSUS: Primary | ICD-10-CM

## 2025-05-28 PROCEDURE — 64615 CHEMODENERV MUSC MIGRAINE: CPT | Performed by: PSYCHIATRY & NEUROLOGY

## 2025-05-28 ASSESSMENT — PAIN SCALES - GENERAL: PAINLEVEL_OUTOF10: 3

## 2025-05-28 NOTE — PROGRESS NOTES
Patient ID: Kailyn Lopez is a 59 y.o. female.    Head/Face/Jaw Botulinum Injection    Date/Time: 5/28/2025 2:06 PM    Performed by: Evelyn Robles MD  Authorized by: Evelyn Robles MD      Consent:      Consent obtained:  Verbal     Consent given by:  Patient    Procedure details:      EMG used?  No     Electrical stimulation used?  No     Diluted by:  Preservative free saline     Medications: 200 Units onabotulinumtoxinA 100 unit; 50 Units onabotulinumtoxinA 100 unit      Total units available:  200       Ad hoc region injected:  Head see diagram with 200 units     Total units injected:  200     Total units wasted:  0    Post-procedure details:      Patient tolerance of procedure:  Tolerated well, no immediate complications    Comments: Please do not rub areas for 24 hours.  No pressure above eyebrows for 24 hours.  Watch out for helmets, headlamps, headbands, goggles, or massage for 24 hours.  If there is discomfort, ice for the first 24 hour,s heat after that.  Headaches may worsen, or you may experience neck stiffness.  If this occurs use your usual headache medication or a mild anti inflammatory such as advil or aleve.  Please call if you have difficulty swallowing.    Medrol

## 2025-05-29 ENCOUNTER — TELEPHONE (OUTPATIENT)
Dept: NEUROLOGY | Facility: CLINIC | Age: 60
End: 2025-05-29
Payer: COMMERCIAL

## 2025-05-29 DIAGNOSIS — G43.711 INTRACTABLE CHRONIC MIGRAINE WITHOUT AURA AND WITH STATUS MIGRAINOSUS: ICD-10-CM

## 2025-05-29 RX ORDER — METHYLPREDNISOLONE 4 MG/1
TABLET ORAL
Qty: 21 TABLET | Refills: 0 | Status: SHIPPED | OUTPATIENT
Start: 2025-05-29 | End: 2025-06-04

## 2025-06-04 LAB
ALBUMIN SERPL-MCNC: 4.3 G/DL (ref 3.6–5.1)
ALP SERPL-CCNC: 80 U/L (ref 37–153)
ALT SERPL-CCNC: 16 U/L (ref 6–29)
ANION GAP SERPL CALCULATED.4IONS-SCNC: 9 MMOL/L (CALC) (ref 7–17)
AST SERPL-CCNC: 19 U/L (ref 10–35)
BILIRUB SERPL-MCNC: 0.9 MG/DL (ref 0.2–1.2)
BUN SERPL-MCNC: 19 MG/DL (ref 7–25)
CALCIUM SERPL-MCNC: 9.6 MG/DL (ref 8.6–10.4)
CHLORIDE SERPL-SCNC: 104 MMOL/L (ref 98–110)
CHOLEST SERPL-MCNC: 208 MG/DL
CHOLEST/HDLC SERPL: 3.1 (CALC)
CO2 SERPL-SCNC: 26 MMOL/L (ref 20–32)
CREAT SERPL-MCNC: 0.67 MG/DL (ref 0.5–1.03)
EGFRCR SERPLBLD CKD-EPI 2021: 102 ML/MIN/1.73M2
GLUCOSE SERPL-MCNC: 94 MG/DL (ref 65–99)
HDLC SERPL-MCNC: 67 MG/DL
LDLC SERPL CALC-MCNC: 115 MG/DL (CALC)
NONHDLC SERPL-MCNC: 141 MG/DL (CALC)
POTASSIUM SERPL-SCNC: 4.5 MMOL/L (ref 3.5–5.3)
PROT SERPL-MCNC: 7.1 G/DL (ref 6.1–8.1)
SODIUM SERPL-SCNC: 139 MMOL/L (ref 135–146)
TRIGL SERPL-MCNC: 148 MG/DL

## 2025-06-11 ENCOUNTER — OFFICE VISIT (OUTPATIENT)
Dept: PRIMARY CARE | Facility: CLINIC | Age: 60
End: 2025-06-11
Payer: COMMERCIAL

## 2025-06-11 VITALS
HEART RATE: 91 BPM | BODY MASS INDEX: 35.68 KG/M2 | WEIGHT: 222 LBS | HEIGHT: 66 IN | OXYGEN SATURATION: 97 % | DIASTOLIC BLOOD PRESSURE: 80 MMHG | SYSTOLIC BLOOD PRESSURE: 110 MMHG

## 2025-06-11 DIAGNOSIS — E66.01 SEVERE OBESITY (BMI 35.0-35.9 WITH COMORBIDITY) (MULTI): ICD-10-CM

## 2025-06-11 DIAGNOSIS — J45.20 INTERMITTENT ASTHMA WITHOUT COMPLICATION, UNSPECIFIED ASTHMA SEVERITY (HHS-HCC): ICD-10-CM

## 2025-06-11 DIAGNOSIS — E11.9 TYPE 2 DIABETES MELLITUS WITHOUT COMPLICATION, WITHOUT LONG-TERM CURRENT USE OF INSULIN: ICD-10-CM

## 2025-06-11 DIAGNOSIS — R63.8 CRAVING FOR PARTICULAR FOOD: ICD-10-CM

## 2025-06-11 DIAGNOSIS — R63.5 WEIGHT GAIN: Primary | ICD-10-CM

## 2025-06-11 LAB — HBA1C MFR BLD: 5.2 % (ref 4.2–6.5)

## 2025-06-11 PROCEDURE — 3008F BODY MASS INDEX DOCD: CPT | Performed by: STUDENT IN AN ORGANIZED HEALTH CARE EDUCATION/TRAINING PROGRAM

## 2025-06-11 PROCEDURE — 1036F TOBACCO NON-USER: CPT | Performed by: STUDENT IN AN ORGANIZED HEALTH CARE EDUCATION/TRAINING PROGRAM

## 2025-06-11 PROCEDURE — 83036 HEMOGLOBIN GLYCOSYLATED A1C: CPT | Mod: CLIA WAIVED TEST | Performed by: STUDENT IN AN ORGANIZED HEALTH CARE EDUCATION/TRAINING PROGRAM

## 2025-06-11 PROCEDURE — 3044F HG A1C LEVEL LT 7.0%: CPT | Performed by: STUDENT IN AN ORGANIZED HEALTH CARE EDUCATION/TRAINING PROGRAM

## 2025-06-11 PROCEDURE — 3079F DIAST BP 80-89 MM HG: CPT | Performed by: STUDENT IN AN ORGANIZED HEALTH CARE EDUCATION/TRAINING PROGRAM

## 2025-06-11 PROCEDURE — 99214 OFFICE O/P EST MOD 30 MIN: CPT | Performed by: STUDENT IN AN ORGANIZED HEALTH CARE EDUCATION/TRAINING PROGRAM

## 2025-06-11 PROCEDURE — 3074F SYST BP LT 130 MM HG: CPT | Performed by: STUDENT IN AN ORGANIZED HEALTH CARE EDUCATION/TRAINING PROGRAM

## 2025-06-11 RX ORDER — BUPROPION HYDROCHLORIDE 150 MG/1
150 TABLET ORAL EVERY MORNING
Qty: 30 TABLET | Refills: 1 | Status: SHIPPED | OUTPATIENT
Start: 2025-06-11 | End: 2025-08-10

## 2025-06-11 ASSESSMENT — PATIENT HEALTH QUESTIONNAIRE - PHQ9
1. LITTLE INTEREST OR PLEASURE IN DOING THINGS: NOT AT ALL
2. FEELING DOWN, DEPRESSED OR HOPELESS: NOT AT ALL
SUM OF ALL RESPONSES TO PHQ9 QUESTIONS 1 AND 2: 0
1. LITTLE INTEREST OR PLEASURE IN DOING THINGS: NOT AT ALL
SUM OF ALL RESPONSES TO PHQ9 QUESTIONS 1 AND 2: 0
2. FEELING DOWN, DEPRESSED OR HOPELESS: NOT AT ALL

## 2025-06-12 DIAGNOSIS — E66.811 CLASS 1 OBESITY DUE TO EXCESS CALORIES WITH BODY MASS INDEX (BMI) OF 34.0 TO 34.9 IN ADULT, UNSPECIFIED WHETHER SERIOUS COMORBIDITY PRESENT: ICD-10-CM

## 2025-06-12 DIAGNOSIS — E66.09 CLASS 1 OBESITY DUE TO EXCESS CALORIES WITH BODY MASS INDEX (BMI) OF 34.0 TO 34.9 IN ADULT, UNSPECIFIED WHETHER SERIOUS COMORBIDITY PRESENT: ICD-10-CM

## 2025-06-12 RX ORDER — METFORMIN HYDROCHLORIDE 500 MG/1
500 TABLET, EXTENDED RELEASE ORAL
Qty: 90 TABLET | Refills: 1 | Status: SHIPPED | OUTPATIENT
Start: 2025-06-12

## 2025-06-18 ENCOUNTER — APPOINTMENT (OUTPATIENT)
Dept: PRIMARY CARE | Facility: CLINIC | Age: 60
End: 2025-06-18
Payer: COMMERCIAL

## 2025-07-02 DIAGNOSIS — E11.9 TYPE 2 DIABETES MELLITUS WITHOUT COMPLICATION, WITHOUT LONG-TERM CURRENT USE OF INSULIN: ICD-10-CM

## 2025-07-06 DIAGNOSIS — R63.8 CRAVING FOR PARTICULAR FOOD: ICD-10-CM

## 2025-07-07 RX ORDER — BUPROPION HYDROCHLORIDE 150 MG/1
150 TABLET ORAL EVERY MORNING
Qty: 90 TABLET | Refills: 1 | Status: SHIPPED | OUTPATIENT
Start: 2025-07-07 | End: 2025-09-05

## 2025-07-14 ENCOUNTER — PATIENT MESSAGE (OUTPATIENT)
Dept: OBSTETRICS AND GYNECOLOGY | Facility: CLINIC | Age: 60
End: 2025-07-14
Payer: COMMERCIAL

## 2025-07-14 DIAGNOSIS — Z79.890 MENOPAUSAL SYNDROME ON HORMONE REPLACEMENT THERAPY: ICD-10-CM

## 2025-07-14 DIAGNOSIS — N95.1 MENOPAUSAL SYNDROME ON HORMONE REPLACEMENT THERAPY: ICD-10-CM

## 2025-07-16 RX ORDER — PROGESTERONE 100 MG/1
100 CAPSULE ORAL NIGHTLY
Qty: 90 CAPSULE | Refills: 3 | OUTPATIENT
Start: 2025-07-16

## 2025-07-16 RX ORDER — PROGESTERONE 100 MG/1
100 CAPSULE ORAL NIGHTLY
Qty: 90 CAPSULE | Refills: 3 | Status: SHIPPED | OUTPATIENT
Start: 2025-07-16

## 2025-07-28 PROBLEM — R53.1 WEAKNESS: Status: ACTIVE | Noted: 2025-07-28

## 2025-07-28 PROBLEM — S93.421A SPRAIN OF DELTOID LIGAMENT OF RIGHT ANKLE: Status: ACTIVE | Noted: 2024-05-28

## 2025-07-31 ENCOUNTER — OFFICE VISIT (OUTPATIENT)
Facility: CLINIC | Age: 60
End: 2025-07-31
Payer: COMMERCIAL

## 2025-07-31 VITALS
SYSTOLIC BLOOD PRESSURE: 126 MMHG | BODY MASS INDEX: 35.68 KG/M2 | OXYGEN SATURATION: 100 % | WEIGHT: 222 LBS | RESPIRATION RATE: 16 BRPM | HEIGHT: 66 IN | DIASTOLIC BLOOD PRESSURE: 82 MMHG | HEART RATE: 96 BPM

## 2025-07-31 DIAGNOSIS — M79.2 NEURALGIA AND NEURITIS: Primary | ICD-10-CM

## 2025-07-31 PROCEDURE — 99214 OFFICE O/P EST MOD 30 MIN: CPT | Performed by: ANESTHESIOLOGY

## 2025-07-31 PROCEDURE — 3008F BODY MASS INDEX DOCD: CPT | Performed by: ANESTHESIOLOGY

## 2025-07-31 PROCEDURE — 99204 OFFICE O/P NEW MOD 45 MIN: CPT | Performed by: ANESTHESIOLOGY

## 2025-07-31 ASSESSMENT — ENCOUNTER SYMPTOMS
NUMBNESS: 1
ENDOCRINE NEGATIVE: 1
MUSCULOSKELETAL NEGATIVE: 1
PSYCHIATRIC NEGATIVE: 1
CARDIOVASCULAR NEGATIVE: 1
CONSTITUTIONAL NEGATIVE: 1
HEMATOLOGIC/LYMPHATIC NEGATIVE: 1
GASTROINTESTINAL NEGATIVE: 1
WEAKNESS: 1
ALLERGIC/IMMUNOLOGIC NEGATIVE: 1
EYES NEGATIVE: 1
RESPIRATORY NEGATIVE: 1

## 2025-07-31 ASSESSMENT — PAIN SCALES - GENERAL
PAINLEVEL_OUTOF10: 2
PAINLEVEL_OUTOF10: 2

## 2025-07-31 ASSESSMENT — LIFESTYLE VARIABLES: TOTAL SCORE: 1

## 2025-07-31 ASSESSMENT — PAIN - FUNCTIONAL ASSESSMENT: PAIN_FUNCTIONAL_ASSESSMENT: 0-10

## 2025-07-31 NOTE — PATIENT INSTRUCTIONS
What to Expect When you are having a procedure    Pending your health insurance provider, an authorization may be required before your procedure can be scheduled.    On average procedure authorization can take from 3-10 business days to obtain, however, some insurances may take up to 30 days if an appeal needs to be written or if your provider needs to talk with the insurance company to explain why the procedure needs to be performed.  Procedures are completed in one of 3 locations.   [facility]  [address], [place] [address]  [facility]  [address], [place] [address]  [facility]  [address] [place], [place] [address]  Our office staff will give you the date of your procedure and the procedure time will be provided by the location you are having your procedure at.    The schedulers will call you typically 1-2 days before your procedure to give you an arrival time and procedure time.   Procedures are typically completed with local anesthetic, to numb the skin, and take anywhere from 2-10 min to complete.    If needed light Oral or IV sedation is available, we are not able to put patients asleep for their procedure.   The hospital facilities do REQUIRE YOU TO HAVE A , however exceptions can be made for certain procedure.    We are placing numbing medication by nerves, which can cause temporary numbing to muscles in rare cases.  If you arrive with no , your procedure may be cancelled.    For most procedures, plan to be at the facility for about 1 -2 hr.    Your bedside nurse will provide post-procedure instructions to you at discharge.

## 2025-07-31 NOTE — PROGRESS NOTES
Subjective   Patient ID: Kailyn Lopez is a 59 y.o. female who presents for Foot Pain.  HPI  Patient here today for a new patient evaluation of her right foot and ankle pain.  She reports > 10 years ago she sprained her ankle, and was diagnosed with a soft tissue sprain.  After that injury she was never able to walk normal. She had a full work up and was told it was OK.  Then several years later she was walking downtown and felt a pop and ended up having a right peroneal tendon rupture.  She saw  at [facility] and had a repair and was non-weight bearing for many months.  She had two soft casts to follow the surgery then moved to a hard cast.  She states that night she had a sensation of something between her toes and she through it was gauze.  When the cast was removed she did not have any gauze.  She started PT to get her walkign again for several months.  Then when things were calming she noticed some residual pain.  She has seen several doctors, last one was Dr. Alcala who helped her get some ROM.  She saw Dr. George who felt that it maybe her back and had a LTR and it was not helpful.  She saw Dr. Sarabia and had a MRI and was told it was not her back.  She also had a EMG compelted at [facility] and it was normal at that time.  She did have a popliteal block for her surgery    She has decreased ROM of the right ankle.  She has pain in between inth large toe and second time.  She has numbness as well.  She has weakness in the big toe.  She also has decreased ROM of the toes as well.  She also has swelling around the right lateral ankle.  She has some pain in the posterior ankle.  She has a restrictive sensation in her foot.  Her arch feels numb as well.  When she is lying in bed or if she twists her foot she has a odd sensation in her foot.  Position matters.     Pain Score:   2       Review of Systems   Constitutional: Negative.    HENT: Negative.     Eyes: Negative.    Respiratory: Negative.     Cardiovascular:  Negative.    Gastrointestinal: Negative.    Endocrine: Negative.    Genitourinary: Negative.    Musculoskeletal: Negative.    Skin: Negative.    Allergic/Immunologic: Negative.    Neurological:  Positive for weakness and numbness.   Hematological: Negative.    Psychiatric/Behavioral: Negative.         Objective   Physical Exam  Vitals and nursing note reviewed.   Constitutional:       Appearance: Normal appearance.   HENT:      Head: Normocephalic and atraumatic.      Right Ear: Ear canal and external ear normal.      Left Ear: Ear canal and external ear normal.      Nose: Nose normal.      Mouth/Throat:      Mouth: Mucous membranes are moist.      Pharynx: Oropharynx is clear.     Eyes:      Conjunctiva/sclera: Conjunctivae normal.      Pupils: Pupils are equal, round, and reactive to light.       Cardiovascular:      Rate and Rhythm: Normal rate.   Pulmonary:      Effort: Pulmonary effort is normal. No respiratory distress.     Musculoskeletal:      Cervical back: Normal range of motion and neck supple.      Lumbar back: Tenderness present. Normal range of motion.      Right ankle: Swelling present. Tenderness present. Decreased range of motion.      Right foot: Decreased range of motion. Swelling and bony tenderness present. No Charcot foot or foot drop.        Legs:      Skin:     General: Skin is warm and dry.     Neurological:      Mental Status: She is alert and oriented to person, place, and time.      Sensory: Sensory deficit present.      Motor: Weakness present.      Coordination: Coordination is intact.      Gait: Gait is intact.     Psychiatric:         Mood and Affect: Mood normal.         Thought Content: Thought content normal.         Assessment/Plan   Problem List Items Addressed This Visit    None  Visit Diagnoses         Codes      Neuralgia and neuritis    -  Primary M79.2               I nice discussion with the patient today our plan will be as follows.    Radiology: All available imaging was  reviewed today.  I did go over the patient's lumbar imaging today.  She does have some facet hypertrophy and minimal foraminal stenosis no significant pathology in the lumbar spine noted that would correlate with the patient's pain.    Physically: Patient has done a significant amount of physical therapy.  She still does range of motion with her foot and ankle on a daily basis.    Psychologically: No issues at this time no changes at this time    Medication: No changes at this time    Duration: 2 years    Intervention: Patient's pain is in the distribution of the common peroneal nerve and its branches.  The patient did have a popliteal block completed as well as a tourniquet around this area.  I will get the patient set up for a right sciatic nerve block at the popliteal fossa as a diagnostic measure to see if this does alleviate some of her pain and discomfort.        Please note that this report has been produced using speech recognition software. It may contain errors related to grammar, punctuation or spelling. Electronically signed, but not reviewed.     Eliecer Peng MD 07/31/25 11:20 AM

## 2025-08-06 ENCOUNTER — APPOINTMENT (OUTPATIENT)
Dept: OBSTETRICS AND GYNECOLOGY | Facility: CLINIC | Age: 60
End: 2025-08-06
Payer: COMMERCIAL

## 2025-08-27 ENCOUNTER — APPOINTMENT (OUTPATIENT)
Dept: NEUROLOGY | Facility: CLINIC | Age: 60
End: 2025-08-27
Payer: COMMERCIAL

## 2025-08-27 VITALS
DIASTOLIC BLOOD PRESSURE: 74 MMHG | BODY MASS INDEX: 35.51 KG/M2 | HEART RATE: 96 BPM | WEIGHT: 220 LBS | SYSTOLIC BLOOD PRESSURE: 110 MMHG | RESPIRATION RATE: 20 BRPM

## 2025-08-27 DIAGNOSIS — G43.711 INTRACTABLE CHRONIC MIGRAINE WITHOUT AURA AND WITH STATUS MIGRAINOSUS: ICD-10-CM

## 2025-08-27 PROCEDURE — 64615 CHEMODENERV MUSC MIGRAINE: CPT | Performed by: PSYCHIATRY & NEUROLOGY

## 2025-08-27 RX ORDER — METHYLPREDNISOLONE 4 MG/1
TABLET ORAL
Qty: 21 TABLET | Refills: 0 | Status: SHIPPED | OUTPATIENT
Start: 2025-08-27 | End: 2025-09-02

## 2025-08-27 RX ORDER — TIRZEPATIDE 7.5 MG/.5ML
7.5 INJECTION, SOLUTION SUBCUTANEOUS
COMMUNITY
Start: 2025-08-18

## 2025-08-27 RX ORDER — ERYTHROMYCIN 5 MG/G
1 OINTMENT OPHTHALMIC
COMMUNITY
Start: 2025-07-07

## 2025-08-27 RX ORDER — GLYCOPYRRONIUM 2.4 G/100G
CLOTH TOPICAL
COMMUNITY
Start: 2025-08-22

## 2025-08-27 RX ORDER — BRIMONIDINE 5 MG/G
GEL TOPICAL
COMMUNITY
Start: 2025-06-26

## 2025-08-27 ASSESSMENT — PAIN SCALES - GENERAL: PAINLEVEL_OUTOF10: 0-NO PAIN

## 2025-08-29 ENCOUNTER — HOSPITAL ENCOUNTER (OUTPATIENT)
Dept: OPERATING ROOM | Facility: HOSPITAL | Age: 60
Setting detail: OUTPATIENT SURGERY
Discharge: HOME | End: 2025-08-29
Payer: COMMERCIAL

## 2025-08-29 VITALS
HEART RATE: 78 BPM | BODY MASS INDEX: 35.36 KG/M2 | WEIGHT: 220 LBS | RESPIRATION RATE: 16 BRPM | DIASTOLIC BLOOD PRESSURE: 86 MMHG | OXYGEN SATURATION: 100 % | SYSTOLIC BLOOD PRESSURE: 133 MMHG | HEIGHT: 66 IN | TEMPERATURE: 96.8 F

## 2025-08-29 DIAGNOSIS — M25.571 CHRONIC PAIN OF RIGHT ANKLE: Primary | ICD-10-CM

## 2025-08-29 DIAGNOSIS — G89.29 CHRONIC PAIN OF RIGHT ANKLE: Primary | ICD-10-CM

## 2025-08-29 DIAGNOSIS — M79.2 NEURALGIA AND NEURITIS: ICD-10-CM

## 2025-08-29 PROCEDURE — 3600000006 HC OR TIME - EACH INCREMENTAL 1 MINUTE - PROCEDURE LEVEL ONE

## 2025-08-29 PROCEDURE — 3600000001 HC OR TIME - INITIAL BASE CHARGE - PROCEDURE LEVEL ONE

## 2025-08-29 PROCEDURE — 7100000010 HC PHASE TWO TIME - EACH INCREMENTAL 1 MINUTE

## 2025-08-29 PROCEDURE — 2500000004 HC RX 250 GENERAL PHARMACY W/ HCPCS (ALT 636 FOR OP/ED): Performed by: ANESTHESIOLOGY

## 2025-08-29 PROCEDURE — 7100000009 HC PHASE TWO TIME - INITIAL BASE CHARGE

## 2025-08-29 PROCEDURE — 64445 NJX AA&/STRD SCIATIC NRV IMG: CPT | Performed by: ANESTHESIOLOGY

## 2025-08-29 PROCEDURE — 76942 ECHO GUIDE FOR BIOPSY: CPT | Performed by: ANESTHESIOLOGY

## 2025-08-29 RX ORDER — ROPIVACAINE HYDROCHLORIDE 5 MG/ML
INJECTION, SOLUTION EPIDURAL; INFILTRATION; PERINEURAL AS NEEDED
Status: COMPLETED | OUTPATIENT
Start: 2025-08-29 | End: 2025-08-29

## 2025-08-29 RX ORDER — DEXAMETHASONE SODIUM PHOSPHATE 10 MG/ML
INJECTION INTRAMUSCULAR; INTRAVENOUS AS NEEDED
Status: COMPLETED | OUTPATIENT
Start: 2025-08-29 | End: 2025-08-29

## 2025-08-29 RX ORDER — LIDOCAINE HYDROCHLORIDE 20 MG/ML
INJECTION, SOLUTION EPIDURAL; INFILTRATION; INTRACAUDAL; PERINEURAL AS NEEDED
Status: COMPLETED | OUTPATIENT
Start: 2025-08-29 | End: 2025-08-29

## 2025-08-29 RX ADMIN — DEXAMETHASONE SODIUM PHOSPHATE 10 MG: 10 INJECTION, SOLUTION INTRAMUSCULAR; INTRAVENOUS at 12:48

## 2025-08-29 RX ADMIN — LIDOCAINE HYDROCHLORIDE 5 ML: 20 INJECTION, SOLUTION EPIDURAL; INFILTRATION; INTRACAUDAL; PERINEURAL at 12:47

## 2025-08-29 RX ADMIN — ROPIVACAINE HYDROCHLORIDE 9 ML: 5 INJECTION, SOLUTION EPIDURAL; INFILTRATION; PERINEURAL at 12:47

## 2025-08-29 ASSESSMENT — PAIN - FUNCTIONAL ASSESSMENT
PAIN_FUNCTIONAL_ASSESSMENT: 0-10
PAIN_FUNCTIONAL_ASSESSMENT: 0-10

## 2025-08-29 ASSESSMENT — PAIN SCALES - GENERAL
PAINLEVEL_OUTOF10: 5 - MODERATE PAIN
PAINLEVEL_OUTOF10: 0 - NO PAIN

## 2025-08-29 ASSESSMENT — PAIN DESCRIPTION - DESCRIPTORS: DESCRIPTORS: ACHING;TIGHTNESS

## 2025-09-03 DIAGNOSIS — G89.29 CHRONIC PAIN OF RIGHT ANKLE: Primary | ICD-10-CM

## 2025-09-03 DIAGNOSIS — M25.571 CHRONIC PAIN OF RIGHT ANKLE: Primary | ICD-10-CM

## 2025-09-12 ENCOUNTER — APPOINTMENT (OUTPATIENT)
Dept: PRIMARY CARE | Facility: CLINIC | Age: 60
End: 2025-09-12
Payer: COMMERCIAL

## 2025-09-19 ENCOUNTER — APPOINTMENT (OUTPATIENT)
Dept: PRIMARY CARE | Facility: CLINIC | Age: 60
End: 2025-09-19
Payer: COMMERCIAL

## 2025-11-26 ENCOUNTER — APPOINTMENT (OUTPATIENT)
Dept: NEUROLOGY | Facility: CLINIC | Age: 60
End: 2025-11-26
Payer: COMMERCIAL

## 2025-12-01 ENCOUNTER — APPOINTMENT (OUTPATIENT)
Dept: OBSTETRICS AND GYNECOLOGY | Facility: CLINIC | Age: 60
End: 2025-12-01
Payer: COMMERCIAL

## (undated) DEVICE — DRAPE, PAD, PREP, W/ 9 IN CUFF, 24 X 41, LF, NS

## (undated) DEVICE — Device

## (undated) DEVICE — TOWEL, SURGICAL, NEURO, O/R, 16 X 26, BLUE, STERILE

## (undated) DEVICE — GLOVE, SURGICAL, PROTEXIS,  6.5, PF, LATEX

## (undated) DEVICE — DEVICE, RESECTING, SMOL,  WITH HANDSET AVETA, 2.9MM

## (undated) DEVICE — HYSTEROSCOPE, AVETA CORAL, 4.6MM

## (undated) DEVICE — ACCESSORY, AVETA, WASTE MANAGEMENT

## (undated) DEVICE — DRESSING, NON-ADHERENT, TELFA, OUCHLESS, 3 X 8 IN, STERILE